# Patient Record
Sex: FEMALE | Race: WHITE | NOT HISPANIC OR LATINO | Employment: OTHER | URBAN - METROPOLITAN AREA
[De-identification: names, ages, dates, MRNs, and addresses within clinical notes are randomized per-mention and may not be internally consistent; named-entity substitution may affect disease eponyms.]

---

## 2023-09-25 ENCOUNTER — HOSPITAL ENCOUNTER (OUTPATIENT)
Dept: DATA CONVERSION | Facility: HOSPITAL | Age: 68
Discharge: HOME | End: 2023-09-25
Payer: MEDICARE

## 2023-09-25 DIAGNOSIS — Z87.891 PERSONAL HISTORY OF NICOTINE DEPENDENCE: ICD-10-CM

## 2023-09-25 DIAGNOSIS — M47.26 OTHER SPONDYLOSIS WITH RADICULOPATHY, LUMBAR REGION: ICD-10-CM

## 2023-09-25 DIAGNOSIS — M54.16 RADICULOPATHY, LUMBAR REGION: ICD-10-CM

## 2023-09-25 DIAGNOSIS — M54.51 VERTEBROGENIC LOW BACK PAIN: ICD-10-CM

## 2023-09-25 DIAGNOSIS — M48.062 SPINAL STENOSIS, LUMBAR REGION WITH NEUROGENIC CLAUDICATION: ICD-10-CM

## 2023-09-25 DIAGNOSIS — Z98.84 BARIATRIC SURGERY STATUS: ICD-10-CM

## 2023-10-02 ENCOUNTER — APPOINTMENT (OUTPATIENT)
Dept: PRIMARY CARE | Facility: CLINIC | Age: 68
End: 2023-10-02
Payer: MEDICARE

## 2023-10-02 PROBLEM — G62.9 NEUROPATHY: Status: ACTIVE | Noted: 2023-10-02

## 2023-10-02 PROBLEM — K46.0 INCARCERATED HERNIA: Status: ACTIVE | Noted: 2023-10-02

## 2023-10-02 PROBLEM — F32.1 CURRENT MODERATE EPISODE OF MAJOR DEPRESSIVE DISORDER (MULTI): Status: ACTIVE | Noted: 2023-10-02

## 2023-10-02 PROBLEM — F41.9 ANXIETY: Status: ACTIVE | Noted: 2023-10-02

## 2023-10-02 PROBLEM — M54.12 CERVICAL RADICULOPATHY: Status: ACTIVE | Noted: 2023-10-02

## 2023-10-02 PROBLEM — K43.0 INCISIONAL HERNIA OF ANTERIOR ABDOMINAL WALL WITH OBSTRUCTION: Status: ACTIVE | Noted: 2023-10-02

## 2023-10-02 PROBLEM — E66.01 SEVERE OBESITY (BMI >= 40) (MULTI): Status: ACTIVE | Noted: 2023-10-02

## 2023-10-02 PROBLEM — E11.9 TYPE 2 DIABETES MELLITUS WITHOUT COMPLICATION, WITHOUT LONG-TERM CURRENT USE OF INSULIN (MULTI): Status: ACTIVE | Noted: 2023-10-02

## 2023-10-02 PROBLEM — M15.9 PRIMARY OSTEOARTHRITIS INVOLVING MULTIPLE JOINTS: Status: ACTIVE | Noted: 2023-10-02

## 2023-10-02 PROBLEM — M15.0 PRIMARY OSTEOARTHRITIS INVOLVING MULTIPLE JOINTS: Status: ACTIVE | Noted: 2023-10-02

## 2023-10-02 PROBLEM — G47.33 OBSTRUCTIVE SLEEP APNEA SYNDROME: Status: ACTIVE | Noted: 2020-09-14

## 2023-10-02 PROBLEM — M47.26 OSTEOARTHRITIS OF SPINE WITH RADICULOPATHY, LUMBAR REGION: Status: ACTIVE | Noted: 2023-10-02

## 2023-10-02 PROBLEM — M48.061 DEGENERATIVE LUMBAR SPINAL STENOSIS: Status: ACTIVE | Noted: 2019-06-29

## 2023-10-02 PROBLEM — I10 HTN (HYPERTENSION), BENIGN: Status: ACTIVE | Noted: 2022-03-17

## 2023-10-02 PROBLEM — G47.00 INSOMNIA: Status: ACTIVE | Noted: 2023-10-02

## 2023-10-02 PROBLEM — K43.9 HERNIA OF ABDOMINAL WALL: Status: ACTIVE | Noted: 2023-10-02

## 2023-10-02 PROBLEM — E55.9 VITAMIN D DEFICIENCY: Status: ACTIVE | Noted: 2023-10-02

## 2023-10-02 PROBLEM — M47.812 ARTHROPATHY OF CERVICAL FACET JOINT: Status: ACTIVE | Noted: 2023-10-02

## 2023-10-02 RX ORDER — PREGABALIN 100 MG/1
100 CAPSULE ORAL 2 TIMES DAILY
COMMUNITY
Start: 2023-09-06 | End: 2023-10-31

## 2023-10-02 RX ORDER — TRAZODONE HYDROCHLORIDE 100 MG/1
100 TABLET ORAL NIGHTLY
COMMUNITY
Start: 2022-04-27 | End: 2023-12-21

## 2023-10-02 RX ORDER — MELOXICAM 15 MG/1
15 TABLET ORAL DAILY
COMMUNITY
Start: 2015-02-04 | End: 2023-12-21

## 2023-10-02 RX ORDER — DULOXETIN HYDROCHLORIDE 60 MG/1
1 CAPSULE, DELAYED RELEASE ORAL DAILY
COMMUNITY
End: 2023-12-21

## 2023-10-02 RX ORDER — SERTRALINE HYDROCHLORIDE 100 MG/1
100 TABLET, FILM COATED ORAL EVERY 24 HOURS
COMMUNITY
Start: 2020-01-17 | End: 2023-12-21

## 2023-10-02 RX ORDER — CHOLECALCIFEROL (VITAMIN D3) 25 MCG
25 TABLET ORAL EVERY 24 HOURS
COMMUNITY
End: 2023-10-04 | Stop reason: ALTCHOICE

## 2023-10-02 RX ORDER — AMLODIPINE BESYLATE 10 MG/1
10 TABLET ORAL EVERY 24 HOURS
COMMUNITY
Start: 2021-03-04 | End: 2024-03-18

## 2023-10-02 RX ORDER — FLUTICASONE PROPIONATE 50 MCG
1 SPRAY, SUSPENSION (ML) NASAL EVERY 24 HOURS
COMMUNITY
Start: 2023-01-27 | End: 2023-12-21

## 2023-10-02 RX ORDER — LORATADINE 10 MG/1
10 TABLET ORAL DAILY
COMMUNITY
Start: 2019-02-21 | End: 2023-12-21

## 2023-10-02 RX ORDER — DOCUSATE SODIUM 100 MG/1
1 CAPSULE, LIQUID FILLED ORAL EVERY 24 HOURS
COMMUNITY
End: 2023-12-21

## 2023-10-02 RX ORDER — LISINOPRIL 10 MG/1
10 TABLET ORAL EVERY 24 HOURS
COMMUNITY
Start: 2019-06-06 | End: 2023-10-05

## 2023-10-03 PROBLEM — G47.36 COMORBID SLEEP-RELATED HYPOVENTILATION: Status: ACTIVE | Noted: 2023-10-03

## 2023-10-03 PROBLEM — E66.9 NOCTURNAL HYPOXEMIA DUE TO OBESITY: Status: ACTIVE | Noted: 2023-10-03

## 2023-10-03 PROBLEM — M54.17 LUMBOSACRAL RADICULITIS: Status: ACTIVE | Noted: 2023-10-03

## 2023-10-03 PROBLEM — E11.9 DIABETES MELLITUS (MULTI): Status: ACTIVE | Noted: 2022-03-17

## 2023-10-03 PROBLEM — R60.0 PERIPHERAL EDEMA: Status: ACTIVE | Noted: 2023-10-03

## 2023-10-03 PROBLEM — I82.409 DEEP VEIN THROMBOSIS (MULTI): Status: ACTIVE | Noted: 2019-06-29

## 2023-10-03 PROBLEM — R60.9 PERIPHERAL EDEMA: Status: ACTIVE | Noted: 2023-10-03

## 2023-10-03 PROBLEM — G47.36 NOCTURNAL HYPOXEMIA DUE TO OBESITY: Status: ACTIVE | Noted: 2023-10-03

## 2023-10-04 ENCOUNTER — OFFICE VISIT (OUTPATIENT)
Dept: PRIMARY CARE | Facility: CLINIC | Age: 68
End: 2023-10-04
Payer: MEDICARE

## 2023-10-04 ENCOUNTER — APPOINTMENT (OUTPATIENT)
Dept: PRIMARY CARE | Facility: CLINIC | Age: 68
End: 2023-10-04
Payer: MEDICARE

## 2023-10-04 VITALS
OXYGEN SATURATION: 97 % | RESPIRATION RATE: 16 BRPM | BODY MASS INDEX: 45.96 KG/M2 | WEIGHT: 286 LBS | HEART RATE: 68 BPM | DIASTOLIC BLOOD PRESSURE: 68 MMHG | SYSTOLIC BLOOD PRESSURE: 137 MMHG | TEMPERATURE: 97 F | HEIGHT: 66 IN

## 2023-10-04 DIAGNOSIS — F41.9 ANXIETY: ICD-10-CM

## 2023-10-04 DIAGNOSIS — I10 HTN (HYPERTENSION), BENIGN: ICD-10-CM

## 2023-10-04 DIAGNOSIS — F33.1 MODERATE EPISODE OF RECURRENT MAJOR DEPRESSIVE DISORDER (MULTI): ICD-10-CM

## 2023-10-04 DIAGNOSIS — M47.26 OSTEOARTHRITIS OF SPINE WITH RADICULOPATHY, LUMBAR REGION: ICD-10-CM

## 2023-10-04 DIAGNOSIS — E66.01 CLASS 3 SEVERE OBESITY WITH SERIOUS COMORBIDITY AND BODY MASS INDEX (BMI) OF 45.0 TO 49.9 IN ADULT, UNSPECIFIED OBESITY TYPE (MULTI): Primary | ICD-10-CM

## 2023-10-04 DIAGNOSIS — I10 HTN (HYPERTENSION), BENIGN: Primary | ICD-10-CM

## 2023-10-04 DIAGNOSIS — M15.9 PRIMARY OSTEOARTHRITIS INVOLVING MULTIPLE JOINTS: ICD-10-CM

## 2023-10-04 DIAGNOSIS — E11.59 TYPE 2 DIABETES MELLITUS WITH OTHER CIRCULATORY COMPLICATION, WITHOUT LONG-TERM CURRENT USE OF INSULIN (MULTI): ICD-10-CM

## 2023-10-04 DIAGNOSIS — G47.01 INSOMNIA DUE TO MEDICAL CONDITION: ICD-10-CM

## 2023-10-04 PROBLEM — E66.813 CLASS 3 SEVERE OBESITY WITH SERIOUS COMORBIDITY AND BODY MASS INDEX (BMI) OF 45.0 TO 49.9 IN ADULT: Status: ACTIVE | Noted: 2023-10-02

## 2023-10-04 PROCEDURE — 3075F SYST BP GE 130 - 139MM HG: CPT | Performed by: NURSE PRACTITIONER

## 2023-10-04 PROCEDURE — 1160F RVW MEDS BY RX/DR IN RCRD: CPT | Performed by: NURSE PRACTITIONER

## 2023-10-04 PROCEDURE — 4010F ACE/ARB THERAPY RXD/TAKEN: CPT | Performed by: NURSE PRACTITIONER

## 2023-10-04 PROCEDURE — 1125F AMNT PAIN NOTED PAIN PRSNT: CPT | Performed by: NURSE PRACTITIONER

## 2023-10-04 PROCEDURE — 1159F MED LIST DOCD IN RCRD: CPT | Performed by: NURSE PRACTITIONER

## 2023-10-04 PROCEDURE — 3078F DIAST BP <80 MM HG: CPT | Performed by: NURSE PRACTITIONER

## 2023-10-04 PROCEDURE — 99350 HOME/RES VST EST HIGH MDM 60: CPT | Performed by: NURSE PRACTITIONER

## 2023-10-04 ASSESSMENT — ENCOUNTER SYMPTOMS
DIAPHORESIS: 0
TROUBLE SWALLOWING: 0
DIFFICULTY URINATING: 0
ACTIVITY CHANGE: 0
CHILLS: 0
FREQUENCY: 0
ABDOMINAL DISTENTION: 0
BLOOD IN STOOL: 0
CONSTIPATION: 0
DIARRHEA: 0
RHINORRHEA: 0
FLANK PAIN: 0
SINUS PRESSURE: 0
UNEXPECTED WEIGHT CHANGE: 0
LIGHT-HEADEDNESS: 0
APPETITE CHANGE: 0
DIZZINESS: 0

## 2023-10-04 ASSESSMENT — PAIN SCALES - GENERAL: PAINLEVEL: 5

## 2023-10-04 NOTE — PROGRESS NOTES
Subjective   Patient ID: Aubrie Valdivia is a 68 y.o. female who presents for Follow-up (Generalized d/t difficulty getting to traditional appointments.)    This is a 67 year old female seen today in her private residence. She is awake and alert with appropriate awareness and is ambulatory with her walker. PMH: Depression, anxiety, DMII, BALTAZAR, Severe osteoarthritis, morbid obesity, lumbosacral radiculitis, neuropathy, Cervical radiculopathy.     Active with Dr. Stanley for pain management.  Currently working with injections and medial abrasion which has been effective.  Ecent CT scan shows spinal stenosis of seval levels, spinal cord pinch 2-3 level, pinched nerves, arthritis in joints and sciatica both sides. .Today she reports that her biggest concerns are weight has been steady at 286 lbs and can't loose weight.  Reports that she watches what she eats constantly watches her caloric intake.  Has an poly on her phone which she records all caloric intake.  Reports that weight has been a chronic problem.  Since her gastric bypass surgery she states that her weight remains unchanged.  Reports sleeping well at night, and a monitor and reports her REM sleep.  Independent with all ADL's.  Does report a fall appx 2 weeks ago in the bathroom, hitting her head and did have two black eyes, now healing.  There is no fever, or chills. No nausea, vomiting, diarrhea, headaches, or vision changes or recent falls. There is no hematuria, dysuria, flank pain, or increased urgency. Reports no bright red blood per rectum, melena, or hematochezia. No hematemesis or hemoptysis.         Review of Systems   Constitutional:  Negative for activity change, appetite change, chills, diaphoresis and unexpected weight change.   HENT:  Negative for congestion, rhinorrhea, sinus pressure and trouble swallowing.    Gastrointestinal:  Negative for abdominal distention, blood in stool, constipation and diarrhea.   Genitourinary:  Negative for  "difficulty urinating, flank pain and frequency.   Neurological:  Negative for dizziness and light-headedness.       Objective   /68   Pulse 68   Temp 36.1 °C (97 °F)   Resp 16   Ht 1.676 m (5' 6\")   Wt 130 kg (286 lb)   SpO2 97%   BMI 46.16 kg/m²     Physical Exam  Constitutional:       Appearance: Normal appearance. She is obese.   HENT:      Head: Normocephalic.      Right Ear: Tympanic membrane normal.      Left Ear: Tympanic membrane normal.      Mouth/Throat:      Mouth: Mucous membranes are moist.   Eyes:      Pupils: Pupils are equal, round, and reactive to light.   Cardiovascular:      Rate and Rhythm: Normal rate and regular rhythm.   Pulmonary:      Breath sounds: Normal breath sounds.   Abdominal:      General: Bowel sounds are normal.      Palpations: Abdomen is soft.   Musculoskeletal:      Cervical back: Neck supple. No tenderness.      Comments: Limited ROM all extremeties   Skin:     General: Skin is warm and dry.   Neurological:      Mental Status: She is oriented to person, place, and time.         Assessment/Plan   Problem List Items Addressed This Visit             ICD-10-CM    Class 3 severe obesity with serious comorbidity and body mass index (BMI) of 45.0 to 49.9 in adult (CMS/[facility]) - Primary E66.01, Z68.42    Anxiety F41.9    Primary osteoarthritis involving multiple joints M15.9    Osteoarthritis of spine with radiculopathy, lumbar region M47.26    Insomnia G47.00    HTN (hypertension), benign I10    Current moderate episode of major depressive disorder (CMS/[facility]) F32.1    Diabetes mellitus (CMS/[facility]) E11.9          "

## 2023-10-05 RX ORDER — LISINOPRIL 10 MG/1
10 TABLET ORAL DAILY
Qty: 28 TABLET | Refills: 10 | Status: SHIPPED | OUTPATIENT
Start: 2023-10-05

## 2023-10-18 ENCOUNTER — TELEPHONE (OUTPATIENT)
Dept: PRIMARY CARE | Facility: CLINIC | Age: 68
End: 2023-10-18
Payer: MEDICARE

## 2023-10-18 NOTE — TELEPHONE ENCOUNTER
Patient calls to report she is out of ozempic.  Patient states she didn't know she was to take the cap off the injector when she was done and dosage leaked.  This nurse inquires what dosage patient gave herself last week.  Patient states the box says either 0.25 or 0.50 so I gave myself the 0.50 dose last week and this week and now I'm out.  This nurse able to check the order sent to pharmacy, stating 0.25ml once weekly.  Please Advise.

## 2023-10-28 DIAGNOSIS — M54.17 LUMBOSACRAL RADICULITIS: Primary | ICD-10-CM

## 2023-10-30 ENCOUNTER — HOSPITAL ENCOUNTER (OUTPATIENT)
Dept: GASTROENTEROLOGY | Facility: HOSPITAL | Age: 68
Discharge: HOME | End: 2023-10-30
Payer: MEDICARE

## 2023-10-30 ENCOUNTER — HOSPITAL ENCOUNTER (OUTPATIENT)
Dept: RADIOLOGY | Facility: HOSPITAL | Age: 68
Discharge: HOME | End: 2023-10-30
Payer: MEDICARE

## 2023-10-30 VITALS
HEART RATE: 82 BPM | RESPIRATION RATE: 20 BRPM | HEIGHT: 66 IN | OXYGEN SATURATION: 97 % | DIASTOLIC BLOOD PRESSURE: 56 MMHG | BODY MASS INDEX: 45 KG/M2 | WEIGHT: 280 LBS | SYSTOLIC BLOOD PRESSURE: 152 MMHG | TEMPERATURE: 97.3 F

## 2023-10-30 PROCEDURE — 94760 N-INVAS EAR/PLS OXIMETRY 1: CPT

## 2023-10-30 PROCEDURE — 64494 INJ PARAVERT F JNT L/S 2 LEV: CPT | Performed by: STUDENT IN AN ORGANIZED HEALTH CARE EDUCATION/TRAINING PROGRAM

## 2023-10-30 PROCEDURE — 64494 INJ PARAVERT F JNT L/S 2 LEV: CPT | Mod: 50 | Performed by: STUDENT IN AN ORGANIZED HEALTH CARE EDUCATION/TRAINING PROGRAM

## 2023-10-30 PROCEDURE — 64493 INJ PARAVERT F JNT L/S 1 LEV: CPT | Mod: 50 | Performed by: STUDENT IN AN ORGANIZED HEALTH CARE EDUCATION/TRAINING PROGRAM

## 2023-10-30 PROCEDURE — 64636 DESTROY L/S FACET JNT ADDL: CPT | Mod: 50

## 2023-10-30 PROCEDURE — 64493 INJ PARAVERT F JNT L/S 1 LEV: CPT | Performed by: STUDENT IN AN ORGANIZED HEALTH CARE EDUCATION/TRAINING PROGRAM

## 2023-10-30 PROCEDURE — 64635 DESTROY LUMB/SAC FACET JNT: CPT | Mod: 50

## 2023-10-30 PROCEDURE — 76000 FLUOROSCOPY <1 HR PHYS/QHP: CPT

## 2023-10-30 ASSESSMENT — ENCOUNTER SYMPTOMS
SORE THROAT: 0
UNEXPECTED WEIGHT CHANGE: 0
LIGHT-HEADEDNESS: 0
HEADACHES: 0
WEAKNESS: 0
CHILLS: 0
COUGH: 0
DIZZINESS: 0
RHINORRHEA: 0
FEVER: 0
NERVOUS/ANXIOUS: 0
SHORTNESS OF BREATH: 0
DYSPHORIC MOOD: 0
COLOR CHANGE: 0
BRUISES/BLEEDS EASILY: 0
EYE DISCHARGE: 0
PALPITATIONS: 0
NUMBNESS: 0
MYALGIAS: 0

## 2023-10-30 ASSESSMENT — PAIN SCALES - GENERAL
PAINLEVEL_OUTOF10: 10 - WORST POSSIBLE PAIN
PAINLEVEL_OUTOF10: 5 - MODERATE PAIN

## 2023-10-30 ASSESSMENT — PAIN - FUNCTIONAL ASSESSMENT
PAIN_FUNCTIONAL_ASSESSMENT: 0-10
PAIN_FUNCTIONAL_ASSESSMENT: 0-10

## 2023-10-30 ASSESSMENT — PAIN DESCRIPTION - DESCRIPTORS: DESCRIPTORS: SHARP;JABBING

## 2023-10-30 ASSESSMENT — COLUMBIA-SUICIDE SEVERITY RATING SCALE - C-SSRS
1. IN THE PAST MONTH, HAVE YOU WISHED YOU WERE DEAD OR WISHED YOU COULD GO TO SLEEP AND NOT WAKE UP?: NO
2. HAVE YOU ACTUALLY HAD ANY THOUGHTS OF KILLING YOURSELF?: NO
6. HAVE YOU EVER DONE ANYTHING, STARTED TO DO ANYTHING, OR PREPARED TO DO ANYTHING TO END YOUR LIFE?: NO

## 2023-10-30 NOTE — OP NOTE
"Procedure Note: 10/30/2023    PROCEDURE NAME: Bilateral diagnostic lumbar medial branch nerve blocks, L4/5, L5/S1    Patient Information: Aubrie Valdivia, MRN: [id number], : 1955  Chief Complaint: Low back pain    Pain Diagnosis: The diagnosis of lumbar facet arthropathy has been made given the patient's clinical evaluation at prior evaluation.    Vitals:BP (!) 172/92   Pulse 86   Temp 36.5 °C (97.7 °F) (Temporal)   Resp 18   Ht 1.676 m (5' 6\")   Wt 127 kg (280 lb)   SpO2 95%   BMI 45.19 kg/m²     DESCRIPTION OF PROCEDURE:    Aubrie Valdivia was brought to the procedure room. The assistant obtained vital signs, which were found to be stable. She was then informed of the procedure, its benefits, and its risks, and her questions were answered regarding the procedure. Written informed consent was obtained from the patient. Immediately prior to starting the procedure, a time-out safety check was conducted and the patient's identification, procedure name, and procedure site were confirmed with the patient.    Bilateral Diagnostic Lumbar Medial Branch Blocks of L3, L4 and L5DR  After informed written consent was obtained, the patient was placed in prone position. Monitoring of pulse oximetry, heart rate, and blood pressure was done pre-procedure, and post-procedure. During the procedure the patient was monitored with continuous pulse-ox. A time out was performed before the beginning of the procedure. The correct site and laterality were marked. The skin was prepped with chlorhexidine, allowed to dry for 3 minutes, and draped in a sterile fashion.     Using an AP and oblique fluoroscopic view, the right transverse process of L4, L5, and the sacral ala were identified. The entry sites were marked and infiltrated with 1 mL of lidocaine 1% using a 25 g 1.5 inch needle. A 22 g 7 in. spinal needle was advanced along the superior margin of the L4 transverse process and lateral to the L4 superior articulating " process. It was directed inferiorly and medially so that the tip struck the junction of the base of the transverse process and advanced 2 mm along the course of the L3 medial branch nerve. A similar procedure was performed at L4 medial branch nerve at the junction of the L5 transverse process and superior articulating process. A similar procedure was performed at the L5 dorsal ramus at the junction of the S1 superior articular process and right sacral ala. This was then repeated at the same levels on the left in a similar fashion. A lateral fluoroscopic image was taken to confirm appropriate needle placement. At each location after negative aspiration, 0.5 ml of 2% lidocaine was injected. All needles were removed      Anesthesia: local anesthesia   Complications: none      Bipin Dougherty MD

## 2023-10-30 NOTE — DISCHARGE INSTRUCTIONS
DISCHARGEINSTRUCTIONS FOR INJECTIONS     You underwent bilateral diagnostic lumbar medial branch nerve blocks today    Aftermost injections, it is recommended that you relax and limit your activity for the remainder of the day unless you have been told otherwise by your pain physician.  You should not drive a car, operate machinery, or make important legal decisions unless otherwise directed by your pain physician.  You may resume your normal activity, including exercise, tomorrow.      Keep a written pain diary of how much pain relief you experienced following the injection procedure and the length of time of pain relief you experienced pain relief. Following diagnostic injections like medial branch nerve blocks, sacroiliac joint blocks, stellate ganglion injections and other blocks, it is very important you record the specific amount of pain relief you experienced immediately after the injectionand how long it lasted. Your doctor will ask you for this information at your follow up visit.     For all injections, please keep the injection site dry and inspect the site for a couple of days. You may remove the Band-Aid the day of the injection at any time.     Some discomfort, bruising or slight swelling may occur at the injection site. This is not abnormal if it occurs.  If needed you may:    -Take over the counter medication such as Tylenol or Motrin.   -Apply an ice pack for 30 minutes, 2 to 3 times a day for the first 24 hours.     You may shower today; no soaking baths, hot tubs, whirlpools or swimming pools for two days.      If you are given steroids in your injection, it may take 3-5 days for the steroid medication to take effect. You may notice a worsening of your symptoms for 1-2 days after the injection. This is not abnormal.  You may use acetaminophen, ibuprofen, or prescription medication that your doctor may have prescribed for you if you need to do so.     A few common side effects of steroids include  facial flushing, sweating, restlessness, irritability,difficulty sleeping, increase in blood sugar, and increased blood pressure. If you have diabetes, please monitor your blood sugar at least once a day for at least 5 days. If you have poorly controlled high blood pressure, monitoryour blood pressure for at least 2 days and contact your primary care physician if these numbers are unusually high for you.      If you take aspirin or non-steroidal anti-inflammatory drugs (examples are Motrin, Advil, ibuprofen, Naprosyn, Voltaren, Relafen, etc.) you may restart these this evening, but stop taking it 3 days before your next appointment, unless instructed otherwiseby your physician.      You do not need to discontinue non-aspirin-containing pain medications prior to an injection (examples: Celebrex, tramadol, hydrocodone and acetaminophen).      If you take a blood thinning medication (Coumadin, Lovenox, Fragmin,Ticlid, Plavix, Pradaxa, etc.), please discuss this with your primary care physician/cardiologist and your pain physician. These medications MUST be discontinued before you can have an injection safely, without the risk of uncontrolled bleeding. If these medications are not discontinued for an appropriate period of time, you will not be able to receivean injection.      If you are taking Coumadin, please have your INR checked the morning of your procedure and bringthe result to your appointment unless otherwise instructed. If your INR is over 1.2, your injection may need to be rescheduled to avoid uncontrolled bleeding from the needle placement.     Call [facility] at [phone] between 8am-4pm Monday - Friday if you are experiencing the following:    If you received an epidural or spinal injection:    -Headache that doesnot go away with medicine, is worse when sitting or standing up, and is greatly relieved upon lying down.   -Severe pain worse than or different than your baseline pain.    -Chills or fever (101º F or greater).   -Drainage or signs of infection at the injection site     Go directly to the Emergency Department if you are experiencing the following and received an epidural or spinal injection:   -Abrupt weakness or progressive weakness in your legs that starts after you leave the clinic.   -Abrupt severe or worsening numbness in your legs.   -Inability to urinate after the injection or loss of bowel or bladder control without the urge to defecate or urinate.     If you have a clinical question that cannot wait until your next appointment, please call [phone] between 8am-4pm Monday - Friday or send a [employer] message. We do our best to return all non-emergency messages within 24 hours, Monday - Friday. A nurse or physician will return your message.      If you need to cancel an appointment, please call the scheduling staff at [phone] during normal business hours or leave a message at least 24 hours in advance.     If you are going to be sedated for your next procedure, you MUST have responsible adult who can legally drive accompany you home. You cannot eat or drink for eight hours prior to the planned procedure if you are going to receive sedation. You may take your non-blood thinning medications with a small sip of water.

## 2023-10-30 NOTE — H&P
History Of Present Illness  Aubrie Valdivia is a 68 y.o. female presenting with low back pain from lumbar facet arthropathy. 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 + BSO, Omentectomy, Debulking        Social History  She reports that she quit smoking about 28 years ago. Her smoking use included cigarettes. She smoked an average of .5 packs per day. She has never used smokeless tobacco. She reports current alcohol use. She reports that she does not use drugs.    Family History  Family History   Problem Relation Name Age of Onset    Alzheimer's disease Mother      Heart disease Father      Heart disease Sister      No Known Problems Daughter          Allergies  Patient has no known allergies.    Review of Systems   Constitutional:  Negative for chills, fever and unexpected weight change.   HENT:  Negative for congestion, rhinorrhea, sneezing and sore throat.    Eyes:  Negative for discharge.   Respiratory:  Negative for cough and shortness of breath.    Cardiovascular:  Negative for chest pain, palpitations and leg swelling.   Musculoskeletal:  Negative for gait problem and myalgias.   Skin:  Negative for color change and rash.   Neurological:  Negative for dizziness, weakness, light-headedness, numbness and headaches.   Hematological:  Does not bruise/bleed easily.   Psychiatric/Behavioral:  Negative for dysphoric mood. The patient is not nervous/anxious.         Physical Exam  Vitals and nursing note reviewed.   Constitutional:       General: She is not in acute distress.      "Appearance: Normal appearance. She is not ill-appearing.   HENT:      Head: Normocephalic and atraumatic.   Eyes:      Conjunctiva/sclera: Conjunctivae normal.   Pulmonary:      Effort: Pulmonary effort is normal. No respiratory distress.   Musculoskeletal:         General: No swelling or deformity.      Cervical back: Neck supple. No rigidity or tenderness.      Right lower leg: No edema.      Left lower leg: No edema.   Skin:     General: Skin is warm and dry.      Findings: No bruising, erythema, lesion or rash.   Neurological:      General: No focal deficit present.      Mental Status: She is alert and oriented to person, place, and time.   Psychiatric:         Mood and Affect: Mood normal.          Last Recorded Vitals  Blood pressure (!) 167/92, pulse 77, temperature 36.5 °C (97.7 °F), temperature source Temporal, resp. rate 16, height 1.676 m (5' 6\"), weight 127 kg (280 lb), SpO2 95 %.    Relevant Results         Assessment/Plan   Active Problems:  Lumbar facet arthropathy    She continues to have axial low back pain, unchanged from before. Proceed with bilateral L4/5, L5/S1 medial branch nerve blocks as planned         I spent 10 minutes in the professional and overall care of this patient.      Bipin Dougherty MD    "

## 2023-10-30 NOTE — POST-PROCEDURE NOTE
Returns from procedure awake and conversing. Bed low & locked. Bandaids x 3 d/I. Friend @ bedside.    1236-Ambulates with walker to wheelchair as preop. Tolerating PO water.    1237-Discharged in stable condition via wheelchair with friend Johanne.

## 2023-10-31 RX ORDER — PREGABALIN 100 MG/1
CAPSULE ORAL
Qty: 56 CAPSULE | Refills: 4 | Status: SHIPPED | OUTPATIENT
Start: 2023-10-31 | End: 2024-04-15

## 2023-10-31 NOTE — H&P (VIEW-ONLY)
[facility]  Follow Up Office Visit Note 2023    Patient Information: Aubrie Valdivia, MRN: [id number], : 1955   Primary Care/Referring Physician: Chacha Bradley MD, [address] / [place] [address]     Chief Complaint: Low back pain  Interval History: She returns for follow up after her 2nd bilateral L4/5, L5/S1 mbb's    Today she reports again 90% pain relief from this procedure. She had less pain and wasn't having to use her walker as much. She is overall very happy with the results and would like to proceed with RF ablation    At baseline she describes primarily axial pain, with radiation to her buttocks, right moreso than left, that worsens significantly when standing and trying to walk    Brief History of Pain: Ms. Aubrie Valdivia is a 68 y.o. female with a PMHx of HTN, T2DM, BALTAZAR, depression, anxiety who presents for evaluation of low back and neck pain.    For reference, she reports pain going from her low back down the back of both legs to the feet. The pain is described as dull constantly, with certain movements causing severe sharp pain. This severe pain happens with twisting, flexion and extension but appears worse with extension. She reports constant numbness in her entire left hand, with her right hand being intermittently numb. Her feet are also constantly numb, which improves when she elevates her feet. She denies any significant burning pain. Of note, she has a history of gastric bypass 2021 with significant weight loss (initially 397 lb, lost about 120 lbs but now weight loss has plateaued).    Current Pain Medications: Duloxetine 60 mg daily, Meloxicam 15 mg daily, Pregabalin 50 mg BID  Previously Tried Pain Medications: Gabapentin 800 mg TID - stopped this and didn't notice any worsening pain    Relevant Surgeries: Denies spine or major joint surgery  Injections: L4/5 GHADA - no benefit. Lumbar TFESI w/ Dr. Fajardo without benefit  Physical/Occupational Therapy:  She has done PT which she felt made her pain worse    Medications:   Current Outpatient Medications   Medication Instructions    amLODIPine (NORVASC) 10 mg, oral, Every 24 hours    docusate sodium (Colace) 100 mg capsule 1 capsule, oral, Every 24 hours    DULoxetine (Cymbalta) 60 mg DR capsule 1 capsule, oral, Daily    fluticasone (Flonase) 50 mcg/actuation nasal spray 1 spray, Each Nostril, Every 24 hours    lisinopril 10 mg, oral, Daily    loratadine (CLARITIN) 10 mg, oral, Daily    meloxicam (MOBIC) 15 mg, oral, Daily    multivit-min/iron/FA/vit K/lut (MULTIVITAMIN WOMEN 50 PLUS ORAL) Every 24 hours    pregabalin (Lyrica) 100 mg capsule 1 CAP BY MOUTH TWICE A DAY    semaglutide 0.25 mg, subcutaneous, Weekly    sertraline (ZOLOFT) 100 mg, oral, Every 24 hours    traZODone (DESYREL) 100 mg, oral, Nightly      Allergies: No Known Allergies    Past Medical & Surgical History:  Past Medical History:   Diagnosis Date    Back pain     Hypertension     Malignant neoplasm of unspecified ovary (CMS/HCC) 2015    Malignant neoplasm of ovary      Past Surgical History:   Procedure Laterality Date    APPENDECTOMY  2015    Appendectomy    BARIATRIC SURGERY      CHOLECYSTECTOMY  2015    Cholecystectomy    HYSTERECTOMY  2015    Hysterectomy    OTHER SURGICAL HISTORY  2015    Resection Of Ovarian Malig. + BSO, Omentectomy, Debulking       Family History   Problem Relation Name Age of Onset    Alzheimer's disease Mother      Heart disease Father      Heart disease Sister      No Known Problems Daughter       Social History     Socioeconomic History    Marital status:      Spouse name: Not on file    Number of children: Not on file    Years of education: Not on file    Highest education level: Not on file   Occupational History    Not on file   Tobacco Use    Smoking status: Former     Packs/day: .5     Types: Cigarettes     Quit date:      Years since quittin.8    Smokeless tobacco:  "Never   Vaping Use    Vaping Use: Never used   Substance and Sexual Activity    Alcohol use: Yes     Comment: vodka and lime nightly    Drug use: Never    Sexual activity: Yes   Other Topics Concern    Not on file   Social History Narrative    Not on file     Social Determinants of Health     Financial Resource Strain: Not on file   Food Insecurity: Not on file   Transportation Needs: Not on file   Physical Activity: Not on file   Stress: Not on file   Social Connections: Not on file   Intimate Partner Violence: Not on file   Housing Stability: Not on file       Problems, Past medical history, past surgical history, Medications, allergies, social and family history reviewed and as per the electronic medical record from today's encounter    Review of Systems:  CONST: No fever, chills, fatigue, weight changes  EYES: No loss of vision  ENT: No hearing loss, tinnitus  CV: No chest pain, palpitations  RESP: No dyspnea, shortness of breath, cough  GI: No stool incontinence, nausea, vomiting  : No urinary incontinence  MSK: No joint swelling  SKIN: No rash, no hives  NEURO: No headache, dizziness, weakness, paresthesias  PSYCH: No anxiety, depression or suicidal ideation  HEM/LYMPH: No easy bruising or bleeding  All other systems reviewed are negative     Physical Exam:  Vitals: Ht 1.651 m (5' 5\")   Wt 127 kg (280 lb)   BMI 46.59 kg/m²   Limited by telemedicine  General: No apparent distress. Alert, appropriate, oriented x 3. Mood generally positive, affect congruent. Speaking in full sentences.   HENT: Normocephalic, atraumatic. Hearing intact.  Lungs: Grossly nonlabored breathing.    Laboratory Data:  The following laboratory data were reviewed during this visit:   Lab Results   Component Value Date    WBC 6.7 10/07/2021    RBC 5.39 (H) 10/07/2021    HGB 16.0 (H) 10/07/2021    HCT 51.4 (H) 10/07/2021     10/07/2021      Lab Results   Component Value Date    INR 1.1 06/20/2020     Lab Results   Component Value " Date    CREATININE 1.0 04/06/2022    HGBA1C 5.4 10/07/2021       Imaging:  The following imaging impressions were reviewed by me during this visit:    -4/24/23 lumbar spine MRI shows L2-3 mild-to-moderate central canal narrowing with moderate right neural foramen narrowing. L3-4 severe canal stenosis, severe right and moderate left neural foramen stenosis. L4-5 there is a large central and left paramedian disc herniation with cranial extrusion w/ resultant severe canal stenosis and severe left neural foramen stenosis. L5-S1 there is a slightly right paramedian disc herniation abutting the right S1 nerve root, bilateral foraminal stenosis.    I also personally reviewed the images from the above studies myself. These images and my interpretation of them contributed to the management and decision making of the patient's medical plan.    ASSESSMENT:  Ms. Aubrie Valdivia is a 68 y.o. female with low back pain that is consistent with:    1. Lumbar facet arthropathy    2. Degenerative lumbar spinal stenosis    3. Vertebrogenic low back pain    4. Lumbar radiculopathy        PLAN:  Radiology: -I reviewed her 4/24/23 lumbar spine MRI which shows L2-3 mild-to-moderate central canal narrowing with moderate right neural foramen narrowing. L3-4 severe canal stenosis, severe right and moderate left neural foramen stenosis. L4-5 there is a large central and left paramedian disc herniation with cranial extrusion w/ resultant severe canal stenosis and severe left neural foramen stenosis. L5-S1 there is a slightly right paramedian disc herniation abutting the right S1 nerve root, bilateral foraminal stenosis.  - Given severity of degenerative changes on cervical spine xray could consider MRI in the future    Physically: She has tried PT in the past and it made her pain significantly worse. She appears fairly sedentary and should try to maintain activity as tolerated with the use of assistive devices  - Her weight loss has plateaued  after her gastric bypass and she is interested in seeking additional guidance. I referred her to our weight loss specialist for additional evaluation and treatment    Psychologically: No needs at this time    Medication: -Continue Pregabalin 100 mg BID    Duration: Multiple years    Intervention: -I suspect her pain is multi-factorial secondary to lumbar facet arthropathy, vertebrogenic low back pain, lumbar radiculopathy, and lumbar spinal stenosis. Given significant radicular component to her pain she underwent interlaminar GHADA at L4/5 with no benefit. She then underwent diagnostic bilateral lumbar medial branch nerve blocks at L4/5, L5/S1 x2 with 90% improvement each time. She would like to proceed with RF ablation at these levels utilizing live fluoroscopy.   - If no benefit from this could consider Intracept (although has worse pain with extension than flexion). Given severity of her degenerative changes she may need to be referred for surgical evaluation        Sincerely,  Bipin Dougherty MD  [facility] - [place]

## 2023-10-31 NOTE — PROGRESS NOTES
[facility]  Follow Up Office Visit Note 2023    Patient Information: Aubrie Valdivia, MRN: [id number], : 1955   Primary Care/Referring Physician: Chacha Bradley MD, [address] / [place] [address]     Chief Complaint: Low back pain  Interval History: She returns for follow up after her 2nd bilateral L4/5, L5/S1 mbb's    Today she reports again 90% pain relief from this procedure. She had less pain and wasn't having to use her walker as much. She is overall very happy with the results and would like to proceed with RF ablation    At baseline she describes primarily axial pain, with radiation to her buttocks, right moreso than left, that worsens significantly when standing and trying to walk    Brief History of Pain: Ms. Aubrie Valdivia is a 68 y.o. female with a PMHx of HTN, T2DM, BALTAZAR, depression, anxiety who presents for evaluation of low back and neck pain.    For reference, she reports pain going from her low back down the back of both legs to the feet. The pain is described as dull constantly, with certain movements causing severe sharp pain. This severe pain happens with twisting, flexion and extension but appears worse with extension. She reports constant numbness in her entire left hand, with her right hand being intermittently numb. Her feet are also constantly numb, which improves when she elevates her feet. She denies any significant burning pain. Of note, she has a history of gastric bypass 2021 with significant weight loss (initially 397 lb, lost about 120 lbs but now weight loss has plateaued).    Current Pain Medications: Duloxetine 60 mg daily, Meloxicam 15 mg daily, Pregabalin 50 mg BID  Previously Tried Pain Medications: Gabapentin 800 mg TID - stopped this and didn't notice any worsening pain    Relevant Surgeries: Denies spine or major joint surgery  Injections: L4/5 GHADA - no benefit. Lumbar TFESI w/ Dr. Fajardo without benefit  Physical/Occupational Therapy:  She has done PT which she felt made her pain worse    Medications:   Current Outpatient Medications   Medication Instructions    amLODIPine (NORVASC) 10 mg, oral, Every 24 hours    docusate sodium (Colace) 100 mg capsule 1 capsule, oral, Every 24 hours    DULoxetine (Cymbalta) 60 mg DR capsule 1 capsule, oral, Daily    fluticasone (Flonase) 50 mcg/actuation nasal spray 1 spray, Each Nostril, Every 24 hours    lisinopril 10 mg, oral, Daily    loratadine (CLARITIN) 10 mg, oral, Daily    meloxicam (MOBIC) 15 mg, oral, Daily    multivit-min/iron/FA/vit K/lut (MULTIVITAMIN WOMEN 50 PLUS ORAL) Every 24 hours    pregabalin (Lyrica) 100 mg capsule 1 CAP BY MOUTH TWICE A DAY    semaglutide 0.25 mg, subcutaneous, Weekly    sertraline (ZOLOFT) 100 mg, oral, Every 24 hours    traZODone (DESYREL) 100 mg, oral, Nightly      Allergies: No Known Allergies    Past Medical & Surgical History:  Past Medical History:   Diagnosis Date    Back pain     Hypertension     Malignant neoplasm of unspecified ovary (CMS/HCC) 2015    Malignant neoplasm of ovary      Past Surgical History:   Procedure Laterality Date    APPENDECTOMY  2015    Appendectomy    BARIATRIC SURGERY      CHOLECYSTECTOMY  2015    Cholecystectomy    HYSTERECTOMY  2015    Hysterectomy    OTHER SURGICAL HISTORY  2015    Resection Of Ovarian Malig. + BSO, Omentectomy, Debulking       Family History   Problem Relation Name Age of Onset    Alzheimer's disease Mother      Heart disease Father      Heart disease Sister      No Known Problems Daughter       Social History     Socioeconomic History    Marital status:      Spouse name: Not on file    Number of children: Not on file    Years of education: Not on file    Highest education level: Not on file   Occupational History    Not on file   Tobacco Use    Smoking status: Former     Packs/day: .5     Types: Cigarettes     Quit date:      Years since quittin.8    Smokeless tobacco:  "Never   Vaping Use    Vaping Use: Never used   Substance and Sexual Activity    Alcohol use: Yes     Comment: vodka and lime nightly    Drug use: Never    Sexual activity: Yes   Other Topics Concern    Not on file   Social History Narrative    Not on file     Social Determinants of Health     Financial Resource Strain: Not on file   Food Insecurity: Not on file   Transportation Needs: Not on file   Physical Activity: Not on file   Stress: Not on file   Social Connections: Not on file   Intimate Partner Violence: Not on file   Housing Stability: Not on file       Problems, Past medical history, past surgical history, Medications, allergies, social and family history reviewed and as per the electronic medical record from today's encounter    Review of Systems:  CONST: No fever, chills, fatigue, weight changes  EYES: No loss of vision  ENT: No hearing loss, tinnitus  CV: No chest pain, palpitations  RESP: No dyspnea, shortness of breath, cough  GI: No stool incontinence, nausea, vomiting  : No urinary incontinence  MSK: No joint swelling  SKIN: No rash, no hives  NEURO: No headache, dizziness, weakness, paresthesias  PSYCH: No anxiety, depression or suicidal ideation  HEM/LYMPH: No easy bruising or bleeding  All other systems reviewed are negative     Physical Exam:  Vitals: Ht 1.651 m (5' 5\")   Wt 127 kg (280 lb)   BMI 46.59 kg/m²   Limited by telemedicine  General: No apparent distress. Alert, appropriate, oriented x 3. Mood generally positive, affect congruent. Speaking in full sentences.   HENT: Normocephalic, atraumatic. Hearing intact.  Lungs: Grossly nonlabored breathing.    Laboratory Data:  The following laboratory data were reviewed during this visit:   Lab Results   Component Value Date    WBC 6.7 10/07/2021    RBC 5.39 (H) 10/07/2021    HGB 16.0 (H) 10/07/2021    HCT 51.4 (H) 10/07/2021     10/07/2021      Lab Results   Component Value Date    INR 1.1 06/20/2020     Lab Results   Component Value " Date    CREATININE 1.0 04/06/2022    HGBA1C 5.4 10/07/2021       Imaging:  The following imaging impressions were reviewed by me during this visit:    -4/24/23 lumbar spine MRI shows L2-3 mild-to-moderate central canal narrowing with moderate right neural foramen narrowing. L3-4 severe canal stenosis, severe right and moderate left neural foramen stenosis. L4-5 there is a large central and left paramedian disc herniation with cranial extrusion w/ resultant severe canal stenosis and severe left neural foramen stenosis. L5-S1 there is a slightly right paramedian disc herniation abutting the right S1 nerve root, bilateral foraminal stenosis.    I also personally reviewed the images from the above studies myself. These images and my interpretation of them contributed to the management and decision making of the patient's medical plan.    ASSESSMENT:  Ms. Aubrie Valdivia is a 68 y.o. female with low back pain that is consistent with:    1. Lumbar facet arthropathy    2. Degenerative lumbar spinal stenosis    3. Vertebrogenic low back pain    4. Lumbar radiculopathy        PLAN:  Radiology: -I reviewed her 4/24/23 lumbar spine MRI which shows L2-3 mild-to-moderate central canal narrowing with moderate right neural foramen narrowing. L3-4 severe canal stenosis, severe right and moderate left neural foramen stenosis. L4-5 there is a large central and left paramedian disc herniation with cranial extrusion w/ resultant severe canal stenosis and severe left neural foramen stenosis. L5-S1 there is a slightly right paramedian disc herniation abutting the right S1 nerve root, bilateral foraminal stenosis.  - Given severity of degenerative changes on cervical spine xray could consider MRI in the future    Physically: She has tried PT in the past and it made her pain significantly worse. She appears fairly sedentary and should try to maintain activity as tolerated with the use of assistive devices  - Her weight loss has plateaued  after her gastric bypass and she is interested in seeking additional guidance. I referred her to our weight loss specialist for additional evaluation and treatment    Psychologically: No needs at this time    Medication: -Continue Pregabalin 100 mg BID    Duration: Multiple years    Intervention: -I suspect her pain is multi-factorial secondary to lumbar facet arthropathy, vertebrogenic low back pain, lumbar radiculopathy, and lumbar spinal stenosis. Given significant radicular component to her pain she underwent interlaminar GHADA at L4/5 with no benefit. She then underwent diagnostic bilateral lumbar medial branch nerve blocks at L4/5, L5/S1 x2 with 90% improvement each time. She would like to proceed with RF ablation at these levels utilizing live fluoroscopy.   - If no benefit from this could consider Intracept (although has worse pain with extension than flexion). Given severity of her degenerative changes she may need to be referred for surgical evaluation        Sincerely,  Bipin Dougherty MD  [facility] - [place]

## 2023-11-01 ENCOUNTER — TELEMEDICINE (OUTPATIENT)
Dept: PAIN MEDICINE | Facility: CLINIC | Age: 68
End: 2023-11-01
Payer: MEDICARE

## 2023-11-01 VITALS — WEIGHT: 280 LBS | BODY MASS INDEX: 46.65 KG/M2 | HEIGHT: 65 IN

## 2023-11-01 DIAGNOSIS — M48.061 DEGENERATIVE LUMBAR SPINAL STENOSIS: ICD-10-CM

## 2023-11-01 DIAGNOSIS — M54.16 LUMBAR RADICULOPATHY: ICD-10-CM

## 2023-11-01 DIAGNOSIS — M54.51 VERTEBROGENIC LOW BACK PAIN: ICD-10-CM

## 2023-11-01 DIAGNOSIS — M47.816 LUMBAR FACET ARTHROPATHY: Primary | ICD-10-CM

## 2023-11-01 PROCEDURE — 99213 OFFICE O/P EST LOW 20 MIN: CPT | Mod: 95 | Performed by: STUDENT IN AN ORGANIZED HEALTH CARE EDUCATION/TRAINING PROGRAM

## 2023-11-01 PROCEDURE — 99213 OFFICE O/P EST LOW 20 MIN: CPT | Performed by: STUDENT IN AN ORGANIZED HEALTH CARE EDUCATION/TRAINING PROGRAM

## 2023-11-01 ASSESSMENT — PATIENT HEALTH QUESTIONNAIRE - PHQ9
1. LITTLE INTEREST OR PLEASURE IN DOING THINGS: NOT AT ALL
SUM OF ALL RESPONSES TO PHQ9 QUESTIONS 1 AND 2: 0
2. FEELING DOWN, DEPRESSED OR HOPELESS: NOT AT ALL

## 2023-11-01 ASSESSMENT — PAIN SCALES - GENERAL
PAINLEVEL: 5
PAINLEVEL_OUTOF10: 5 - MODERATE PAIN

## 2023-11-01 ASSESSMENT — PAIN - FUNCTIONAL ASSESSMENT: PAIN_FUNCTIONAL_ASSESSMENT: 0-10

## 2023-11-01 ASSESSMENT — PAIN DESCRIPTION - DESCRIPTORS: DESCRIPTORS: ACHING

## 2023-11-07 ENCOUNTER — TELEPHONE (OUTPATIENT)
Dept: PAIN MEDICINE | Facility: CLINIC | Age: 68
End: 2023-11-07
Payer: MEDICARE

## 2023-11-07 ENCOUNTER — TELEPHONE (OUTPATIENT)
Dept: PRIMARY CARE | Facility: CLINIC | Age: 68
End: 2023-11-07
Payer: MEDICARE

## 2023-11-08 ENCOUNTER — LAB (OUTPATIENT)
Dept: LAB | Facility: LAB | Age: 68
End: 2023-11-08
Payer: MEDICARE

## 2023-11-08 ENCOUNTER — OFFICE VISIT (OUTPATIENT)
Dept: PRIMARY CARE | Facility: CLINIC | Age: 68
End: 2023-11-08
Payer: MEDICARE

## 2023-11-08 VITALS
HEART RATE: 68 BPM | WEIGHT: 282 LBS | RESPIRATION RATE: 16 BRPM | HEIGHT: 65 IN | DIASTOLIC BLOOD PRESSURE: 78 MMHG | SYSTOLIC BLOOD PRESSURE: 126 MMHG | OXYGEN SATURATION: 98 % | BODY MASS INDEX: 46.98 KG/M2 | TEMPERATURE: 97.8 F

## 2023-11-08 DIAGNOSIS — I10 HTN (HYPERTENSION), BENIGN: ICD-10-CM

## 2023-11-08 DIAGNOSIS — E55.9 VITAMIN D DEFICIENCY: ICD-10-CM

## 2023-11-08 DIAGNOSIS — E66.01 MORBID OBESITY WITH BMI OF 45.0-49.9, ADULT (MULTI): ICD-10-CM

## 2023-11-08 DIAGNOSIS — M48.061 DEGENERATIVE LUMBAR SPINAL STENOSIS: ICD-10-CM

## 2023-11-08 DIAGNOSIS — E11.59 TYPE 2 DIABETES MELLITUS WITH OTHER CIRCULATORY COMPLICATION, WITHOUT LONG-TERM CURRENT USE OF INSULIN (MULTI): ICD-10-CM

## 2023-11-08 DIAGNOSIS — E03.9 ACQUIRED HYPOTHYROIDISM: ICD-10-CM

## 2023-11-08 DIAGNOSIS — F41.9 ANXIETY: ICD-10-CM

## 2023-11-08 DIAGNOSIS — I82.629 DEEP VEIN THROMBOSIS (DVT) OF OTHER VEIN OF UPPER EXTREMITY, UNSPECIFIED CHRONICITY, UNSPECIFIED LATERALITY (MULTI): ICD-10-CM

## 2023-11-08 DIAGNOSIS — I10 HTN (HYPERTENSION), BENIGN: Primary | ICD-10-CM

## 2023-11-08 LAB
25(OH)D3 SERPL-MCNC: 77 NG/ML (ref 31–100)
ALBUMIN SERPL-MCNC: 3.9 G/DL (ref 3.5–5)
ALP BLD-CCNC: 117 U/L (ref 35–125)
ALT SERPL-CCNC: 10 U/L (ref 5–40)
ANION GAP SERPL CALC-SCNC: 14 MMOL/L
AST SERPL-CCNC: 16 U/L (ref 5–40)
BASOPHILS # BLD AUTO: 0.04 X10*3/UL (ref 0–0.1)
BASOPHILS NFR BLD AUTO: 0.6 %
BILIRUB SERPL-MCNC: 0.5 MG/DL (ref 0.1–1.2)
BUN SERPL-MCNC: 18 MG/DL (ref 8–25)
CALCIUM SERPL-MCNC: 9.1 MG/DL (ref 8.5–10.4)
CHLORIDE SERPL-SCNC: 100 MMOL/L (ref 97–107)
CO2 SERPL-SCNC: 24 MMOL/L (ref 24–31)
CREAT SERPL-MCNC: 0.8 MG/DL (ref 0.4–1.6)
EOSINOPHIL # BLD AUTO: 0.12 X10*3/UL (ref 0–0.7)
EOSINOPHIL NFR BLD AUTO: 1.8 %
ERYTHROCYTE [DISTWIDTH] IN BLOOD BY AUTOMATED COUNT: 13.6 % (ref 11.5–14.5)
EST. AVERAGE GLUCOSE BLD GHB EST-MCNC: 100 MG/DL
GFR SERPL CREATININE-BSD FRML MDRD: 81 ML/MIN/1.73M*2
GLUCOSE SERPL-MCNC: 97 MG/DL (ref 65–99)
HBA1C MFR BLD: 5.1 %
HCT VFR BLD AUTO: 43.8 % (ref 36–46)
HGB BLD-MCNC: 14 G/DL (ref 12–16)
HOLD SPECIMEN: NORMAL
IMM GRANULOCYTES # BLD AUTO: 0.02 X10*3/UL (ref 0–0.7)
IMM GRANULOCYTES NFR BLD AUTO: 0.3 % (ref 0–0.9)
LYMPHOCYTES # BLD AUTO: 0.91 X10*3/UL (ref 1.2–4.8)
LYMPHOCYTES NFR BLD AUTO: 13.6 %
MCH RBC QN AUTO: 31 PG (ref 26–34)
MCHC RBC AUTO-ENTMCNC: 32 G/DL (ref 32–36)
MCV RBC AUTO: 97 FL (ref 80–100)
MONOCYTES # BLD AUTO: 0.62 X10*3/UL (ref 0.1–1)
MONOCYTES NFR BLD AUTO: 9.3 %
NEUTROPHILS # BLD AUTO: 4.99 X10*3/UL (ref 1.2–7.7)
NEUTROPHILS NFR BLD AUTO: 74.4 %
NRBC BLD-RTO: 0 /100 WBCS (ref 0–0)
PLATELET # BLD AUTO: 169 X10*3/UL (ref 150–450)
POTASSIUM SERPL-SCNC: 4.4 MMOL/L (ref 3.4–5.1)
PROT SERPL-MCNC: 7 G/DL (ref 5.9–7.9)
RBC # BLD AUTO: 4.52 X10*6/UL (ref 4–5.2)
SODIUM SERPL-SCNC: 138 MMOL/L (ref 133–145)
TSH SERPL DL<=0.05 MIU/L-ACNC: 1.66 MIU/L (ref 0.27–4.2)
WBC # BLD AUTO: 6.7 X10*3/UL (ref 4.4–11.3)

## 2023-11-08 PROCEDURE — 3044F HG A1C LEVEL LT 7.0%: CPT | Performed by: NURSE PRACTITIONER

## 2023-11-08 PROCEDURE — 36415 COLL VENOUS BLD VENIPUNCTURE: CPT

## 2023-11-08 PROCEDURE — 1125F AMNT PAIN NOTED PAIN PRSNT: CPT | Performed by: NURSE PRACTITIONER

## 2023-11-08 PROCEDURE — 84443 ASSAY THYROID STIM HORMONE: CPT

## 2023-11-08 PROCEDURE — 80053 COMPREHEN METABOLIC PANEL: CPT

## 2023-11-08 PROCEDURE — 1159F MED LIST DOCD IN RCRD: CPT | Performed by: NURSE PRACTITIONER

## 2023-11-08 PROCEDURE — 83036 HEMOGLOBIN GLYCOSYLATED A1C: CPT

## 2023-11-08 PROCEDURE — 3074F SYST BP LT 130 MM HG: CPT | Performed by: NURSE PRACTITIONER

## 2023-11-08 PROCEDURE — 1160F RVW MEDS BY RX/DR IN RCRD: CPT | Performed by: NURSE PRACTITIONER

## 2023-11-08 PROCEDURE — 3008F BODY MASS INDEX DOCD: CPT | Performed by: NURSE PRACTITIONER

## 2023-11-08 PROCEDURE — 82306 VITAMIN D 25 HYDROXY: CPT

## 2023-11-08 PROCEDURE — 85025 COMPLETE CBC W/AUTO DIFF WBC: CPT

## 2023-11-08 PROCEDURE — 4010F ACE/ARB THERAPY RXD/TAKEN: CPT | Performed by: NURSE PRACTITIONER

## 2023-11-08 PROCEDURE — 99349 HOME/RES VST EST MOD MDM 40: CPT | Performed by: NURSE PRACTITIONER

## 2023-11-08 PROCEDURE — 3078F DIAST BP <80 MM HG: CPT | Performed by: NURSE PRACTITIONER

## 2023-11-08 PROCEDURE — 1036F TOBACCO NON-USER: CPT | Performed by: NURSE PRACTITIONER

## 2023-11-08 NOTE — PROGRESS NOTES
Subjective   Patient ID: Aubrie Valdivia is a 68 y.o. female who presents for Follow-up (HTN, Labs, DMII).    This is a 68 year old female seen today in her private residence. She is awake and alert with appropriate awareness and is ambulatory with her walker. PMH: Depression, anxiety, DMII, BALTAZAR, Severe osteoarthritis, morbid obesity, lumbosacral radiculitis, neuropathy, Cervical radiculopathy. Patient seen today at home, in f/u for multiple issues.  Recently started on ozempic injection for weight loss and DMII.  Denies constipation, nausea and appears to be tolerating well.  Recent visit with pain management Dr. Guerrier with 2nd back injection which she feels has been effective thus far.  Feels that she is ambulating better and has been more active.  Appetite  is reported as good continues to monitor her caloric intake by keeping track via phone poly.  Active with psych services for anxiety/depression which is stable and managed.  HTN - managed and stable with current interventions.  Denies chest pain, palpitations, tachycardia, and shortness of breath, wheezing, no PND/orthopnea, or respiratory complaints for the patient. There is no fever, or chills. No nausea, vomiting, diarrhea, headaches, or vision changes or recent falls. There is no hematuria, dysuria, flank pain, or increased urgency.         Current Outpatient Medications:     amLODIPine (Norvasc) 10 mg tablet, Take 1 tablet (10 mg) by mouth once every 24 hours., Disp: , Rfl:     docusate sodium (Colace) 100 mg capsule, Take 1 capsule (100 mg) by mouth once every 24 hours., Disp: , Rfl:     DULoxetine (Cymbalta) 60 mg DR capsule, Take 1 capsule (60 mg) by mouth once daily., Disp: , Rfl:     fluticasone (Flonase) 50 mcg/actuation nasal spray, Administer 1 spray into each nostril once every 24 hours., Disp: , Rfl:     lisinopril 10 mg tablet, 1 TAB BY MOUTH ONCE A DAY, Disp: 28 tablet, Rfl: 10    loratadine (Claritin) 10 mg tablet, Take 1 tablet (10 mg)  "by mouth once daily., Disp: , Rfl:     meloxicam (Mobic) 15 mg tablet, Take 1 tablet (15 mg) by mouth once daily., Disp: , Rfl:     multivit-min/iron/FA/vit K/lut (MULTIVITAMIN WOMEN 50 PLUS ORAL), once every 24 hours., Disp: , Rfl:     pregabalin (Lyrica) 100 mg capsule, 1 CAP BY MOUTH TWICE A DAY, Disp: 56 capsule, Rfl: 4    semaglutide 0.25 mg or 0.5 mg (2 mg/3 mL) pen injector, Inject 0.25 mg under the skin 1 (one) time per week., Disp: 0.75 mL, Rfl: 1    sertraline (Zoloft) 100 mg tablet, Take 1 tablet (100 mg) by mouth once every 24 hours., Disp: , Rfl:     traZODone (Desyrel) 100 mg tablet, Take 1 tablet (100 mg) by mouth once daily at bedtime., Disp: , Rfl:      Review of Systems   CONSTITUTIONAL denies, fever, chills, night sweats, unexplained weight loss.  CARDIOLOGY Denies, palpitations, exertional chest pain, shortness of breath.  RESPIRATORY Negative for, persistent cough, hemoptysis, wheezing.  GI Positive for, constipation, diarrhea.  UROLOGY Denies voiding symptoms.  NEUROLOGY Positive for numbness in her hands and now L foot.  MUSCULOSKELETAL Positive for BLE weakness, and pain.        Objective   /78   Pulse 68   Temp 36.6 °C (97.8 °F) (Temporal)   Resp 16   Ht 1.651 m (5' 5\")   Wt 128 kg (282 lb)   SpO2 98%   BMI 46.93 kg/m²     Physical Exam  GENERAL APPEARANCE: alert and oriented x 3, Pleasant and cooperative, No Acute Distress.          HEART:  no murmurs regular rate and rhythm.          LUNGS: clear to auscultation bilaterally, no wheezes/rhonchi/rales.          ABDOMEN:  soft, NT/ND, BS present.          EXTREMITIES: no edema.          SKIN: no rash or skin lesions.          NEUROLOGIC EXAM: decreased sensation of the LLE, antalgic gait.          DIABETIC FOOT EXAM:  no ulcers, pedal pulses 1+ bilaterally.          MUSCULOSKELETAL: pain in neck and lower back.          PSYCH: affect normal.          FUNCTIONAL: Ambulatory with cane/walker.          REPORTS REVIEWED:  office " notes. ]    Assessment/Plan   Problem List Items Addressed This Visit             ICD-10-CM    Morbid obesity with BMI of 45.0-49.9, adult (CMS/[facility]) E66.01, Z68.42    Relevant Orders    Tsh With Reflex To Free T4 If Abnormal    Anxiety F41.9    Vitamin D deficiency E55.9    Relevant Orders    Vitamin D 25-Hydroxy,Total (for eval of Vitamin D levels)    HTN (hypertension), benign - Primary I10    Relevant Orders    CBC and Auto Differential    Comprehensive metabolic panel    Degenerative lumbar spinal stenosis M48.061    Deep vein thrombosis (CMS/[facility]) I82.409    Diabetes mellitus (CMS/[facility]) E11.9    Relevant Medications    semaglutide 0.25 mg or 0.5 mg (2 mg/3 mL) pen injector    Other Relevant Orders    Hemoglobin A1c     Other Visit Diagnoses         Codes    Acquired hypothyroidism     E03.9    Relevant Orders    Tsh With Reflex To Free T4 If Abnormal        Will continue with [facility] NP Q 3 months and PRN  Continue with ozempic 0.5mg Q weekly x 4 weeks then increase to 0.75 x 4 weeks,Will then re-evaluate effects.  Labs drawn today without complications.  CBC, CMP, TSH, Vit D, A1c         Johanne العلي, APRN-CNP

## 2023-11-27 ENCOUNTER — APPOINTMENT (OUTPATIENT)
Dept: GASTROENTEROLOGY | Facility: HOSPITAL | Age: 68
End: 2023-11-27
Payer: MEDICARE

## 2023-11-27 ENCOUNTER — HOSPITAL ENCOUNTER (OUTPATIENT)
Dept: GASTROENTEROLOGY | Facility: HOSPITAL | Age: 68
Discharge: HOME | End: 2023-11-27
Payer: MEDICARE

## 2023-11-27 ENCOUNTER — HOSPITAL ENCOUNTER (OUTPATIENT)
Dept: RADIOLOGY | Facility: HOSPITAL | Age: 68
Discharge: HOME | End: 2023-11-27
Payer: MEDICARE

## 2023-11-27 VITALS
RESPIRATION RATE: 18 BRPM | HEART RATE: 84 BPM | DIASTOLIC BLOOD PRESSURE: 64 MMHG | WEIGHT: 282 LBS | HEIGHT: 65 IN | TEMPERATURE: 97.9 F | BODY MASS INDEX: 46.98 KG/M2 | SYSTOLIC BLOOD PRESSURE: 145 MMHG | OXYGEN SATURATION: 95 %

## 2023-11-27 DIAGNOSIS — M47.816 LUMBAR FACET ARTHROPATHY: ICD-10-CM

## 2023-11-27 PROCEDURE — 64636 DESTROY L/S FACET JNT ADDL: CPT | Mod: 50 | Performed by: STUDENT IN AN ORGANIZED HEALTH CARE EDUCATION/TRAINING PROGRAM

## 2023-11-27 PROCEDURE — 64635 DESTROY LUMB/SAC FACET JNT: CPT | Mod: 50 | Performed by: STUDENT IN AN ORGANIZED HEALTH CARE EDUCATION/TRAINING PROGRAM

## 2023-11-27 PROCEDURE — 76000 FLUOROSCOPY <1 HR PHYS/QHP: CPT

## 2023-11-27 PROCEDURE — 64636 DESTROY L/S FACET JNT ADDL: CPT | Performed by: STUDENT IN AN ORGANIZED HEALTH CARE EDUCATION/TRAINING PROGRAM

## 2023-11-27 PROCEDURE — 64635 DESTROY LUMB/SAC FACET JNT: CPT | Performed by: STUDENT IN AN ORGANIZED HEALTH CARE EDUCATION/TRAINING PROGRAM

## 2023-11-27 ASSESSMENT — PAIN - FUNCTIONAL ASSESSMENT
PAIN_FUNCTIONAL_ASSESSMENT: 0-10
PAIN_FUNCTIONAL_ASSESSMENT: 0-10

## 2023-11-27 ASSESSMENT — PAIN SCALES - GENERAL
PAINLEVEL_OUTOF10: 0 - NO PAIN
PAINLEVEL_OUTOF10: 8

## 2023-11-27 ASSESSMENT — COLUMBIA-SUICIDE SEVERITY RATING SCALE - C-SSRS
6. HAVE YOU EVER DONE ANYTHING, STARTED TO DO ANYTHING, OR PREPARED TO DO ANYTHING TO END YOUR LIFE?: NO
2. HAVE YOU ACTUALLY HAD ANY THOUGHTS OF KILLING YOURSELF?: NO
2. HAVE YOU ACTUALLY HAD ANY THOUGHTS OF KILLING YOURSELF?: NO
1. IN THE PAST MONTH, HAVE YOU WISHED YOU WERE DEAD OR WISHED YOU COULD GO TO SLEEP AND NOT WAKE UP?: NO
1. IN THE PAST MONTH, HAVE YOU WISHED YOU WERE DEAD OR WISHED YOU COULD GO TO SLEEP AND NOT WAKE UP?: NO
6. HAVE YOU EVER DONE ANYTHING, STARTED TO DO ANYTHING, OR PREPARED TO DO ANYTHING TO END YOUR LIFE?: NO

## 2023-11-27 ASSESSMENT — PAIN DESCRIPTION - DESCRIPTORS: DESCRIPTORS: ACHING

## 2023-11-27 NOTE — OP NOTE
"Procedure Note: 2023    PROCEDURE NAME: Bilateral Lumbar Medial Branch Nerve Radiofrequency Ablation, L4/5, L5/S1    Patient Information: Aubrie Valdivia, MRN: [id number], : 1955  Chief Complaint: Low back pain    Pain Diagnosis: The diagnosis of Lumbar facet arthropathy has been made given the patient's clinical evaluation at prior evaluation.    Vitals:BP (!) 144/99   Pulse 90   Temp 36.5 °C (97.7 °F) (Temporal)   Resp 18   Ht 1.651 m (5' 5\")   Wt 128 kg (282 lb)   SpO2 93%   BMI 46.93 kg/m²     DESCRIPTION OF PROCEDURE:    Aubrie Valdivia was brought to the procedure room. The assistant obtained vital signs, which were found to be stable. She was then informed of the procedure, its benefits, and its risks, and her questions were answered regarding the procedure. Written informed consent was obtained from the patient. Immediately prior to starting the procedure, a time-out safety check was conducted and the patient's identification, procedure name, and procedure site were confirmed with the patient.    Bilateral L3, L4 medial branch nerve and L5 dorsal ramus radiofrequency ablation:  After informed written consent was obtained, the patient was placed in prone position with a pillow under the abdomen to reduce lumbar lordosis. Monitoring of pulse oximetry, heart rate, and blood pressure was done pre-procedure, and post-procedure. During the procedure the patient was monitored with continuous pulse-ox. A time out was performed before the beginning of the procedure. The correct site and laterality were marked. The skin was prepped with chlorhexidine, allowed to dry for a minimum of 3 minutes, and draped in a sterile fashion    Using an AP fluoroscopic view, the right transverse processes of L4, L5 vertebrae and the sacral ala were identified. The skin and subcutaneous tissue over the needle entry sites were anesthetized with infiltration of 1 mL of 1% lidocaine with a 1.5 in. 25 g needle. A " 20-gauge 15 cm radiofrequency needle with a 10 mm active tip was advanced slowly in a co-axial view along the superior margin of the L4 transverse process and lateral to the L4 superior articulating process. It was directed inferiorly and medially so that the tip struck the junction of the base of the transverse process and advanced 2 mm along the course of the L3 medial branch nerve. A similar procedure was performed at the L4 medial branch at the junction of the L5 superior articular process and transverse process. A similar procedure was performed at the L5 dorsal ramus at the junction of the S1 superior articular process and sacral ala. This was repeated on the left side. A lateral fluoroscopic image was taken to confirm appropriate needle placement and to confirm that the needle tip at each level was posterior to the neuroforamen.    Sensory testing then proceeded. At each level, a stimulation probe was placed, and the impedance was found to be between 200 and 800 Ohms and recorded. The patient was then told to report the minimal threshold for sensation, which was recorded. Motor testing was then performed. The patient reported a negative motor response in the muscles of the thigh and leg at 2 Volts. This was repeated for each level and recorded.    After the above sensory testing was complete, 1 ml of 2% lidocaine in each level was injected and allowed to take effect for 30 seconds. Lesioning was then performed at 80 degrees for 90 seconds. The patient tolerated the procedure well. They denied any lower extremity pain or paresthesia during the lesioning process. The probes were then removed and handed off the sterile field. The needles were then removed.       Anesthesia: local anesthesia   Complications: none      Bipin Dougherty MD

## 2023-11-27 NOTE — PERIOPERATIVE NURSING NOTE
Received patient from procedure room.   Patient alert and awake.   Band aid xs2 to low back dry and intact. Discharge instructions reviewed with patient.   Patient pivot transfers to wheelchair.

## 2023-11-27 NOTE — DISCHARGE INSTRUCTIONS
DISCHARGE INSTRUCTIONS FOR INJECTIONS     You underwent bilateral lumbar medial branch nerve radiofrequency ablation today    Aftermost injections, it is recommended that you relax and limit your activity for the remainder of the day unless you have been told otherwise by your pain physician.  You should not drive a car, operate machinery, or make important legal decisions unless otherwise directed by your pain physician.  You may resume your normal activity, including exercise, tomorrow.      Keep a written pain diary of how much pain relief you experienced following the injection procedure and the length of time of pain relief you experienced pain relief. Following diagnostic injections like medial branch nerve blocks, sacroiliac joint blocks, stellate ganglion injections and other blocks, it is very important you record the specific amount of pain relief you experienced immediately after the injectionand how long it lasted. Your doctor will ask you for this information at your follow up visit.     For all injections, please keep the injection site dry and inspect the site for a couple of days. You may remove the Band-Aid the day of the injection at any time.     Some discomfort, bruising or slight swelling may occur at the injection site. This is not abnormal if it occurs.  If needed you may:    -Take over the counter medication such as Tylenol or Motrin.   -Apply an ice pack for 30 minutes, 2 to 3 times a day for the first 24 hours.     You may shower today; no soaking baths, hot tubs, whirlpools or swimming pools for two days.      If you are given steroids in your injection, it may take 3-5 days for the steroid medication to take effect. You may notice a worsening of your symptoms for 1-2 days after the injection. This is not abnormal.  You may use acetaminophen, ibuprofen, or prescription medication that your doctor may have prescribed for you if you need to do so.     A few common side effects of steroids  include facial flushing, sweating, restlessness, irritability,difficulty sleeping, increase in blood sugar, and increased blood pressure. If you have diabetes, please monitor your blood sugar at least once a day for at least 5 days. If you have poorly controlled high blood pressure, monitoryour blood pressure for at least 2 days and contact your primary care physician if these numbers are unusually high for you.      If you take aspirin or non-steroidal anti-inflammatory drugs (examples are Motrin, Advil, ibuprofen, Naprosyn, Voltaren, Relafen, etc.) you may restart these this evening, but stop taking it 3 days before your next appointment, unless instructed otherwiseby your physician.      You do not need to discontinue non-aspirin-containing pain medications prior to an injection (examples: Celebrex, tramadol, hydrocodone and acetaminophen).      If you take a blood thinning medication (Coumadin, Lovenox, Fragmin,Ticlid, Plavix, Pradaxa, etc.), please discuss this with your primary care physician/cardiologist and your pain physician. These medications MUST be discontinued before you can have an injection safely, without the risk of uncontrolled bleeding. If these medications are not discontinued for an appropriate period of time, you will not be able to receivean injection.      If you are taking Coumadin, please have your INR checked the morning of your procedure and bringthe result to your appointment unless otherwise instructed. If your INR is over 1.2, your injection may need to be rescheduled to avoid uncontrolled bleeding from the needle placement.     Call [facility] at [phone] between 8am-4pm Monday - Friday if you are experiencing the following:    If you received an epidural or spinal injection:    -Headache that doesnot go away with medicine, is worse when sitting or standing up, and is greatly relieved upon lying down.   -Severe pain worse than or different than your baseline  pain.   -Chills or fever (101º F or greater).   -Drainage or signs of infection at the injection site     Go directly to the Emergency Department if you are experiencing the following and received an epidural or spinal injection:   -Abrupt weakness or progressive weakness in your legs that starts after you leave the clinic.   -Abrupt severe or worsening numbness in your legs.   -Inability to urinate after the injection or loss of bowel or bladder control without the urge to defecate or urinate.     If you have a clinical question that cannot wait until your next appointment, please call [phone] between 8am-4pm Monday - Friday or send a [employer] message. We do our best to return all non-emergency messages within 24 hours, Monday - Friday. A nurse or physician will return your message.      If you need to cancel an appointment, please call the scheduling staff at [phone] during normal business hours or leave a message at least 24 hours in advance.     If you are going to be sedated for your next procedure, you MUST have responsible adult who can legally drive accompany you home. You cannot eat or drink for eight hours prior to the planned procedure if you are going to receive sedation. You may take your non-blood thinning medications with a small sip of water.

## 2023-12-06 ENCOUNTER — TELEPHONE (OUTPATIENT)
Dept: PRIMARY CARE | Facility: CLINIC | Age: 68
End: 2023-12-06
Payer: MEDICARE

## 2023-12-06 NOTE — TELEPHONE ENCOUNTER
"Patient calls to inquire regarding Ozempic medication.  Patient states she has not lost any weight and in fact gained one pound since starting the Ozempic.  Patient states she is not sure she is giving the med correctly to her self as each time she gives herself the medication, \"there is a lot coming out\".  Patient states she sent a video of giving herself the med a friend who is a nurse and they reported that patient indeed is not giving the medication to herself properly.  Additionally, patient is requesting to know if medication dosage will be increased as previously discussed with provider KEILY العلي, PRAKASH.    "

## 2023-12-06 NOTE — TELEPHONE ENCOUNTER
Per conversation with Provider, Johanne العلي NP patient to have , Genna Singh, RN reach out for Ozempic administration instruction.  Genna made aware. Patient made aware via phone call with this nurse patient to have  instruction/teaching and receive correct dosage of medication for 30 days before change in dosing can begin.

## 2023-12-07 NOTE — TELEPHONE ENCOUNTER
Spoke with patient. Agreed to assistance with Ozempic injections. Plan is for in home visit for next injection,  Wednesday, 12/13/23.

## 2023-12-07 NOTE — TELEPHONE ENCOUNTER
Call placed to patient to offer assistance with Ozempic administration. Left voicemail message requesting return call.

## 2023-12-13 ENCOUNTER — DOCUMENTATION (OUTPATIENT)
Dept: PRIMARY CARE | Facility: CLINIC | Age: 68
End: 2023-12-13
Payer: MEDICARE

## 2023-12-13 NOTE — PROGRESS NOTES
House Calls CM in home visit to assist with Ozempic injection. Patient now aware there are 2 needle covers. Reviewed procedure for setting dose on pen and for injection. Patient administered 0.5mg dose without difficulty. Agreed to call if she has further questions.

## 2023-12-20 DIAGNOSIS — F51.02 ADJUSTMENT INSOMNIA: Primary | ICD-10-CM

## 2023-12-20 DIAGNOSIS — F32.9 REACTIVE DEPRESSION: ICD-10-CM

## 2023-12-20 DIAGNOSIS — M17.5 OTHER SECONDARY OSTEOARTHRITIS OF KNEE, UNSPECIFIED LATERALITY: ICD-10-CM

## 2023-12-20 DIAGNOSIS — K59.01 SLOW TRANSIT CONSTIPATION: ICD-10-CM

## 2023-12-20 DIAGNOSIS — T78.40XS ALLERGY, SEQUELA: ICD-10-CM

## 2023-12-20 DIAGNOSIS — E11.59 TYPE 2 DIABETES MELLITUS WITH OTHER CIRCULATORY COMPLICATION, WITHOUT LONG-TERM CURRENT USE OF INSULIN (MULTI): ICD-10-CM

## 2023-12-21 RX ORDER — SERTRALINE HYDROCHLORIDE 100 MG/1
100 TABLET, FILM COATED ORAL DAILY
Qty: 28 TABLET | Refills: 10 | Status: SHIPPED | OUTPATIENT
Start: 2023-12-21

## 2023-12-21 RX ORDER — MELOXICAM 15 MG/1
15 TABLET ORAL DAILY
Qty: 28 TABLET | Refills: 10 | Status: SHIPPED | OUTPATIENT
Start: 2023-12-21

## 2023-12-21 RX ORDER — SEMAGLUTIDE 0.68 MG/ML
INJECTION, SOLUTION SUBCUTANEOUS
Qty: 3 ML | Refills: 10 | Status: SHIPPED | OUTPATIENT
Start: 2023-12-21 | End: 2024-03-20 | Stop reason: ALTCHOICE

## 2023-12-21 RX ORDER — FLUTICASONE PROPIONATE 50 MCG
SPRAY, SUSPENSION (ML) NASAL
Qty: 16 G | Refills: 10 | Status: SHIPPED | OUTPATIENT
Start: 2023-12-21

## 2023-12-21 RX ORDER — LORATADINE 10 MG/1
10 TABLET ORAL DAILY
Qty: 28 TABLET | Refills: 10 | Status: SHIPPED | OUTPATIENT
Start: 2023-12-21

## 2023-12-21 RX ORDER — DOCUSATE SODIUM 100 MG/1
100 CAPSULE, LIQUID FILLED ORAL DAILY
Qty: 28 CAPSULE | Refills: 10 | Status: SHIPPED | OUTPATIENT
Start: 2023-12-21 | End: 2024-02-08 | Stop reason: WASHOUT

## 2023-12-21 RX ORDER — TRAZODONE HYDROCHLORIDE 100 MG/1
TABLET ORAL
Qty: 28 TABLET | Refills: 10 | Status: SHIPPED | OUTPATIENT
Start: 2023-12-21

## 2023-12-21 RX ORDER — DULOXETIN HYDROCHLORIDE 60 MG/1
60 CAPSULE, DELAYED RELEASE ORAL DAILY
Qty: 28 CAPSULE | Refills: 10 | Status: SHIPPED | OUTPATIENT
Start: 2023-12-21

## 2023-12-27 ENCOUNTER — TELEMEDICINE (OUTPATIENT)
Dept: PAIN MEDICINE | Facility: CLINIC | Age: 68
End: 2023-12-27
Payer: MEDICARE

## 2023-12-27 VITALS — HEIGHT: 65 IN | BODY MASS INDEX: 46.48 KG/M2 | WEIGHT: 279 LBS

## 2023-12-27 DIAGNOSIS — M54.51 VERTEBROGENIC LOW BACK PAIN: ICD-10-CM

## 2023-12-27 DIAGNOSIS — M54.16 LUMBAR RADICULOPATHY: ICD-10-CM

## 2023-12-27 DIAGNOSIS — M47.816 LUMBAR FACET ARTHROPATHY: Primary | ICD-10-CM

## 2023-12-27 PROCEDURE — 99213 OFFICE O/P EST LOW 20 MIN: CPT | Performed by: STUDENT IN AN ORGANIZED HEALTH CARE EDUCATION/TRAINING PROGRAM

## 2023-12-27 ASSESSMENT — PATIENT HEALTH QUESTIONNAIRE - PHQ9
SUM OF ALL RESPONSES TO PHQ9 QUESTIONS 1 AND 2: 0
2. FEELING DOWN, DEPRESSED OR HOPELESS: NOT AT ALL
1. LITTLE INTEREST OR PLEASURE IN DOING THINGS: NOT AT ALL

## 2023-12-27 ASSESSMENT — PAIN DESCRIPTION - DESCRIPTORS: DESCRIPTORS: STABBING

## 2023-12-27 ASSESSMENT — PAIN SCALES - GENERAL
PAINLEVEL_OUTOF10: 5 - MODERATE PAIN
PAINLEVEL: 5

## 2023-12-27 ASSESSMENT — PAIN - FUNCTIONAL ASSESSMENT: PAIN_FUNCTIONAL_ASSESSMENT: 0-10

## 2023-12-27 NOTE — PROGRESS NOTES
[facility]  Follow Up Office Visit Note 2023    Patient Information: Aubrie Valdivia, MRN: [id number], : 1955   Primary Care/Referring Physician: Chacha Bradley MD, [address] / [place] [address]     Chief Complaint: Low back pain  Interval History: She returns for follow up after her bilateral L4/5, L5/S1 RF ablation    Today she reports at least 50% improvement in her pain. She also feels her balance is significantly improved and is overall very happy with these results. She would definitely have this procedure repeated in the future if her pain worsens.     At baseline she describes primarily axial pain, with radiation to her buttocks, right moreso than left, that worsens significantly when standing and trying to walk    Brief History of Pain: Ms. Aubrie Valdivia is a 68 y.o. female with a PMHx of HTN, T2DM, BALTAZAR, depression, anxiety who presents for evaluation of low back and neck pain.    For reference, she reports pain going from her low back down the back of both legs to the feet. The pain is described as dull constantly, with certain movements causing severe sharp pain. This severe pain happens with twisting, flexion and extension but appears worse with extension. She reports constant numbness in her entire left hand, with her right hand being intermittently numb. Her feet are also constantly numb, which improves when she elevates her feet. She denies any significant burning pain. Of note, she has a history of gastric bypass 2021 with significant weight loss (initially 397 lb, lost about 120 lbs but now weight loss has plateaued).    Current Pain Medications: Duloxetine 60 mg daily, Meloxicam 15 mg daily, Pregabalin 100 mg BID  Previously Tried Pain Medications: Gabapentin 800 mg TID - stopped this and didn't notice any worsening pain    Relevant Surgeries: Denies spine or major joint surgery  Injections: Bilateral L4/5, L5/S1 RF ablation - >50% improvement. L4/5  GHADA - no benefit. Lumbar TFESI w/ Dr. Fajardo without benefit  Physical/Occupational Therapy: She has done PT which she felt made her pain worse    Medications:   Current Outpatient Medications   Medication Instructions    amLODIPine (NORVASC) 10 mg, oral, Every 24 hours    docusate sodium (COLACE) 100 mg, oral, Daily    DULoxetine (CYMBALTA) 60 mg, oral, Daily    fluticasone (Flonase) 50 mcg/actuation nasal spray 1 SPRAY EACH NOSTRIL ONCE A DAY    lisinopril 10 mg, oral, Daily    loratadine (CLARITIN) 10 mg, oral, Daily    meloxicam (MOBIC) 15 mg, oral, Daily    multivit-min/iron/FA/vit K/lut (MULTIVITAMIN WOMEN 50 PLUS ORAL) Every 24 hours    Ozempic 0.25 mg or 0.5 mg (2 mg/3 mL) pen injector 0.5MG SUBQ WEEKLY    pregabalin (Lyrica) 100 mg capsule 1 CAP BY MOUTH TWICE A DAY    sertraline (ZOLOFT) 100 mg, oral, Daily    traZODone (Desyrel) 100 mg tablet 1 TAB BY MOUTH DAILY AT BEDTIME      Allergies: No Known Allergies    Past Medical & Surgical History:  Past Medical History:   Diagnosis Date    Back pain     Hiatal hernia     Hypertension     Lumbosacral radiculitis     Malignant neoplasm of unspecified ovary (CMS/HCC)     Malignant neoplasm of ovary    Osteoarthritis     Ovarian cancer (CMS/HCC)     Prediabetes     SBO (small bowel obstruction) (CMS/HCC)     Sciatica     Umbilical hernia       Past Surgical History:   Procedure Laterality Date    APPENDECTOMY      Appendectomy    BARIATRIC SURGERY  06/01/2021    gastric sleeve- [facility]    CHOLECYSTECTOMY  2012    Cholecystectomy    HYSTERECTOMY  2012    with bilateral oophrectomy    OTHER SURGICAL HISTORY      Resection Of Ovarian Malig. + BSO, Omentectomy, Debulking    OTHER SURGICAL HISTORY      Obstructive bowel    OTHER SURGICAL HISTORY  06/2020    endoscopic SBO repair -        Family History   Problem Relation Name Age of Onset    Alzheimer's disease Mother Bebe     Arthritis Mother Bebe     Cancer Mother Bebe     Heart disease  "Father      Heart disease Sister      Drug abuse Brother Kalen     No Known Problems Daughter       Social History     Socioeconomic History    Marital status:      Spouse name: Not on file    Number of children: Not on file    Years of education: Not on file    Highest education level: Not on file   Occupational History    Not on file   Tobacco Use    Smoking status: Former     Packs/day: .5     Types: Cigarettes     Start date: 1986    Smokeless tobacco: Never   Vaping Use    Vaping Use: Never used   Substance and Sexual Activity    Alcohol use: Yes     Alcohol/week: 7.0 standard drinks of alcohol     Types: 7 Shots of liquor per week     Comment: vodka and lime nightly    Drug use: Not Currently    Sexual activity: Not Currently     Partners: Male   Other Topics Concern    Not on file   Social History Narrative    Not on file     Social Determinants of Health     Financial Resource Strain: Not on file   Food Insecurity: Not on file   Transportation Needs: Not on file   Physical Activity: Not on file   Stress: Not on file   Social Connections: Not on file   Intimate Partner Violence: Not on file   Housing Stability: Not on file       Problems, Past medical history, past surgical history, Medications, allergies, social and family history reviewed and as per the electronic medical record from today's encounter    Review of Systems:  CONST: No fever, chills, fatigue, weight changes  EYES: No loss of vision  ENT: No hearing loss, tinnitus  CV: No chest pain, palpitations  RESP: No dyspnea, shortness of breath, cough  GI: No stool incontinence, nausea, vomiting  : No urinary incontinence  MSK: No joint swelling  SKIN: No rash, no hives  NEURO: No headache, dizziness  PSYCH: No anxiety, depression  HEM/LYMPH: No easy bruising or bleeding  All other systems reviewed are negative     Physical Exam:  Vitals: Ht 1.651 m (5' 5\")   Wt 127 kg (279 lb)   BMI 46.43 kg/m²   Limited by telemedicine  General: No " apparent distress. Alert, appropriate, oriented x 3. Mood generally positive, affect congruent. Speaking in full sentences.   HENT: Normocephalic, atraumatic. Hearing intact.  Lungs: Grossly nonlabored breathing.    Laboratory Data:  The following laboratory data were reviewed during this visit:   Lab Results   Component Value Date    WBC 6.7 11/08/2023    RBC 4.52 11/08/2023    HGB 14.0 11/08/2023    HCT 43.8 11/08/2023     11/08/2023      Lab Results   Component Value Date    INR 1.1 06/20/2020     Lab Results   Component Value Date    CREATININE 0.80 11/08/2023    HGBA1C 5.1 11/08/2023       Imaging:  The following imaging impressions were reviewed by me during this visit:    -4/24/23 lumbar spine MRI shows L2-3 mild-to-moderate central canal narrowing with moderate right neural foramen narrowing. L3-4 severe canal stenosis, severe right and moderate left neural foramen stenosis. L4-5 there is a large central and left paramedian disc herniation with cranial extrusion w/ resultant severe canal stenosis and severe left neural foramen stenosis. L5-S1 there is a slightly right paramedian disc herniation abutting the right S1 nerve root, bilateral foraminal stenosis.    I also personally reviewed the images from the above studies myself. These images and my interpretation of them contributed to the management and decision making of the patient's medical plan.    ASSESSMENT:  Ms. Aubrie Valdivia is a 68 y.o. female with low back pain that is consistent with:    1. Lumbar facet arthropathy    2. Vertebrogenic low back pain    3. Lumbar radiculopathy          PLAN:  Radiology: -I reviewed her 4/24/23 lumbar spine MRI which shows L2-3 mild-to-moderate central canal narrowing with moderate right neural foramen narrowing. L3-4 severe canal stenosis, severe right and moderate left neural foramen stenosis. L4-5 there is a large central and left paramedian disc herniation with cranial extrusion w/ resultant severe canal  stenosis and severe left neural foramen stenosis. L5-S1 there is a slightly right paramedian disc herniation abutting the right S1 nerve root, bilateral foraminal stenosis.  - Given severity of degenerative changes on cervical spine xray could consider MRI in the future    Physically: She has tried PT in the past and it made her pain significantly worse. She appears fairly sedentary and should try to maintain activity as tolerated with the use of assistive devices  - Her weight loss has plateaued after her gastric bypass and she is interested in seeking additional guidance. I referred her to our weight loss specialist for additional evaluation and treatment    Psychologically: No needs at this time    Medication: -Continue Pregabalin 100 mg BID    Duration: Multiple years    Intervention: -I suspect her pain is multi-factorial secondary to lumbar facet arthropathy, vertebrogenic low back pain, lumbar radiculopathy, and lumbar spinal stenosis. Given significant radicular component to her pain she underwent interlaminar GHADA at L4/5 with no benefit. She then underwent bilateral lumbar medial branch nerve RF ablation at L4/5, L5/S1 with greater than 50% improvement.  Will monitor for duration of pain relief and consider repeating in the future   - If no benefit from this could consider Intracept (although has worse pain with extension than flexion). Given severity of her degenerative changes she may need to be referred for surgical evaluation        Sincerely,  Bipin Dougherty MD  [facility] - [place]

## 2024-02-07 ENCOUNTER — TELEPHONE (OUTPATIENT)
Dept: PRIMARY CARE | Facility: CLINIC | Age: 69
End: 2024-02-07
Payer: MEDICARE

## 2024-02-08 ENCOUNTER — OFFICE VISIT (OUTPATIENT)
Dept: PRIMARY CARE | Facility: CLINIC | Age: 69
End: 2024-02-08
Payer: MEDICARE

## 2024-02-08 VITALS
HEIGHT: 65 IN | OXYGEN SATURATION: 98 % | BODY MASS INDEX: 45.65 KG/M2 | HEART RATE: 78 BPM | WEIGHT: 274 LBS | DIASTOLIC BLOOD PRESSURE: 88 MMHG | SYSTOLIC BLOOD PRESSURE: 145 MMHG | TEMPERATURE: 97.6 F | RESPIRATION RATE: 16 BRPM

## 2024-02-08 DIAGNOSIS — E11.9 TYPE 2 DIABETES MELLITUS WITHOUT COMPLICATION, WITHOUT LONG-TERM CURRENT USE OF INSULIN (MULTI): ICD-10-CM

## 2024-02-08 DIAGNOSIS — I10 HTN (HYPERTENSION), BENIGN: Primary | Chronic | ICD-10-CM

## 2024-02-08 DIAGNOSIS — M15.9 PRIMARY OSTEOARTHRITIS INVOLVING MULTIPLE JOINTS: Chronic | ICD-10-CM

## 2024-02-08 DIAGNOSIS — E66.01 MORBID OBESITY WITH BMI OF 45.0-49.9, ADULT (MULTI): Chronic | ICD-10-CM

## 2024-02-08 PROCEDURE — 3079F DIAST BP 80-89 MM HG: CPT | Performed by: NURSE PRACTITIONER

## 2024-02-08 PROCEDURE — 4010F ACE/ARB THERAPY RXD/TAKEN: CPT | Performed by: NURSE PRACTITIONER

## 2024-02-08 PROCEDURE — 3077F SYST BP >= 140 MM HG: CPT | Performed by: NURSE PRACTITIONER

## 2024-02-08 PROCEDURE — 1159F MED LIST DOCD IN RCRD: CPT | Performed by: NURSE PRACTITIONER

## 2024-02-08 PROCEDURE — 3008F BODY MASS INDEX DOCD: CPT | Performed by: NURSE PRACTITIONER

## 2024-02-08 PROCEDURE — 99349 HOME/RES VST EST MOD MDM 40: CPT | Performed by: NURSE PRACTITIONER

## 2024-02-08 PROCEDURE — 1160F RVW MEDS BY RX/DR IN RCRD: CPT | Performed by: NURSE PRACTITIONER

## 2024-02-08 PROCEDURE — 1126F AMNT PAIN NOTED NONE PRSNT: CPT | Performed by: NURSE PRACTITIONER

## 2024-02-08 PROCEDURE — 1036F TOBACCO NON-USER: CPT | Performed by: NURSE PRACTITIONER

## 2024-02-08 RX ORDER — DOCUSATE SODIUM 100 MG/1
100 CAPSULE, LIQUID FILLED ORAL 2 TIMES DAILY
COMMUNITY

## 2024-02-08 ASSESSMENT — PAIN SCALES - GENERAL: PAINLEVEL: 0-NO PAIN

## 2024-02-08 NOTE — PROGRESS NOTES
Subjective   Patient ID: Aubrie Valdivia is a 68 y.o. female who presents for No chief complaint on file..    This is a 68 year old female seen today in her private residence. She is awake and alert with appropriate awareness and is ambulatory with her walker. PMH: Depression, anxiety, DMII, BALTAZAR, Severe osteoarthritis, morbid obesity, lumbosacral radiculitis, neuropathy, Cervical radiculopathy.     Patient seen today at home, in f/u for multiple issues.  Recently started on ozempic injection for weight loss and DMII.  Reports that she has lost 8 lbs thus far.  Denies constipation, nausea and appears to be tolerating well.   Recent visit with pain management Dr. Guerrier with 3rd back injection which she feels has been effective.  Feels that she is ambulating better and has been more active.  Appetite  is reported as good continues to monitor her caloric intake by keeping track via phone poly.  Active with psych services for anxiety/depression which is stable and managed.  HTN - managed and stable with current interventions.  Denies chest pain, palpitations, tachycardia, and shortness of breath, wheezing, no PND/orthopnea, or respiratory complaints for the patient. There is no fever, or chills. No nausea, vomiting, diarrhea, headaches, or vision changes or recent falls. There is no hematuria, dysuria, flank pain, or increased urgency.    Home Visit: Medically necessary due to: dementia/cognitive impairment, unsteady gait/poor balance, shortness of breath with minimal exertion, Illness or condition that results in activity limitation or restriction that impacts the ability to leave home such as: equipment and /or human assistance needed to safely leave the home, patient has limited support systems to help the patient attend office visits, the office visit would require excessive physical effort or pain for the patient.            Current Outpatient Medications:     amLODIPine (Norvasc) 10 mg tablet, Take 1 tablet (10  "mg) by mouth once every 24 hours., Disp: , Rfl:     docusate sodium (Colace) 100 mg capsule, Take 1 capsule (100 mg) by mouth 2 times a day., Disp: , Rfl:     DULoxetine (Cymbalta) 60 mg DR capsule, 1 CAP BY MOUTH ONCE A DAY, Disp: 28 capsule, Rfl: 10    fluticasone (Flonase) 50 mcg/actuation nasal spray, 1 SPRAY EACH NOSTRIL ONCE A DAY, Disp: 16 g, Rfl: 10    lisinopril 10 mg tablet, 1 TAB BY MOUTH ONCE A DAY, Disp: 28 tablet, Rfl: 10    loratadine (Claritin) 10 mg tablet, 1 TAB BY MOUTH ONCE A DAY, Disp: 28 tablet, Rfl: 10    meloxicam (Mobic) 15 mg tablet, 1 TAB BY MOUTH EVERY MORNING, Disp: 28 tablet, Rfl: 10    multivit-min/iron/FA/vit K/lut (MULTIVITAMIN WOMEN 50 PLUS ORAL), once every 24 hours., Disp: , Rfl:     Ozempic 0.25 mg or 0.5 mg (2 mg/3 mL) pen injector, 0.5MG SUBQ WEEKLY, Disp: 3 mL, Rfl: 10    pregabalin (Lyrica) 100 mg capsule, 1 CAP BY MOUTH TWICE A DAY, Disp: 56 capsule, Rfl: 4    sertraline (Zoloft) 100 mg tablet, 1 TAB BY MOUTH ONCE A DAY, Disp: 28 tablet, Rfl: 10    traZODone (Desyrel) 100 mg tablet, 1 TAB BY MOUTH DAILY AT BEDTIME, Disp: 28 tablet, Rfl: 10     Review of Systems  CONSTITUTIONAL denies, fever, chills, night sweats, unexplained weight loss. CARDIOLOGY Denies, palpitations, exertional chest pain, shortness of breath. RESPIRATORY Negative for, persistent cough, hemoptysis, wheezing. GI Positive for, constipation, diarrhea. UROLOGY Denies voiding symptoms. NEUROLOGY Positive for numbness in her hands and now L foot. MUSCULOSKELETAL Positive for BLE weakness, and pain.     Objective   /88   Pulse 78   Temp 36.4 °C (97.6 °F) (Temporal)   Resp 16   Ht 1.651 m (5' 5\")   Wt 124 kg (274 lb)   SpO2 98%   BMI 45.60 kg/m²     Physical Exam  GENERAL APPEARANCE: alert and oriented x 3, Pleasant and cooperative, No Acute Distress.          HEART:  no murmurs regular rate and rhythm.          LUNGS: clear to auscultation bilaterally, no wheezes/rhonchi/rales.          ABDOMEN:  " soft, NT/ND, BS present.          EXTREMITIES: no edema.          SKIN: no rash or skin lesions.          NEUROLOGIC EXAM: decreased sensation of the LLE, antalgic gait.          DIABETIC FOOT EXAM:  no ulcers, pedal pulses 1+ bilaterally.          MUSCULOSKELETAL: pain in neck and lower back.          PSYCH: affect normal.          FUNCTIONAL: Ambulatory with cane/walker.          REPORTS REVIEWED:  office notes.     Lab Results   Component Value Date    WBC 6.7 11/08/2023    HGB 14.0 11/08/2023    HCT 43.8 11/08/2023    MCV 97 11/08/2023     11/08/2023      Lab Results   Component Value Date    GLUCOSE 97 11/08/2023    CALCIUM 9.1 11/08/2023     11/08/2023    K 4.4 11/08/2023    CO2 24 11/08/2023     11/08/2023    BUN 18 11/08/2023    CREATININE 0.80 11/08/2023        Assessment/Plan   Diagnoses and all orders for this visit:  HTN (hypertension), benign  Comments:  Managed - continue Norvasc 10mg daily  Morbid obesity with BMI of 45.0-49.9, adult (CMS/[facility])  Comments:  Ongoing - continue with awareness - monitoring k/parish intake - adding increased movement to daily activities  Type 2 diabetes mellitus without complication, without long-term current use of insulin (CMS/[facility])  Comments:  Continue with ozempic - will f/u with a1c with next home visit  Primary osteoarthritis involving multiple joints  Comments:  Continue with supportive santy - pain managment for injections when needed.      Will continue with [facility] home visits. Active with multiple specialist, however is however has limited support and difficulty getting out for appointments.  Follow up in 3 months and PRN - labs with next home visit     More than 50% of time spent in face-to-face discussion @ a total of 40 minutes with this patient on counseling, coordination of care, collaboration with family and staff and review of medical records and diagnostics.    Johanne العلي, APRN-CNP

## 2024-03-15 DIAGNOSIS — I10 PRIMARY HYPERTENSION: Primary | ICD-10-CM

## 2024-03-16 ENCOUNTER — HOSPITAL ENCOUNTER (EMERGENCY)
Facility: HOSPITAL | Age: 69
Discharge: HOME | End: 2024-03-16
Attending: STUDENT IN AN ORGANIZED HEALTH CARE EDUCATION/TRAINING PROGRAM
Payer: MEDICARE

## 2024-03-16 VITALS
OXYGEN SATURATION: 94 % | WEIGHT: 293 LBS | BODY MASS INDEX: 48.82 KG/M2 | SYSTOLIC BLOOD PRESSURE: 149 MMHG | HEIGHT: 65 IN | DIASTOLIC BLOOD PRESSURE: 81 MMHG | RESPIRATION RATE: 17 BRPM | HEART RATE: 82 BPM | TEMPERATURE: 97.9 F

## 2024-03-16 DIAGNOSIS — R04.0 EPISTAXIS: Primary | ICD-10-CM

## 2024-03-16 LAB
ANION GAP SERPL CALC-SCNC: 10 MMOL/L
BASOPHILS # BLD AUTO: 0.05 X10*3/UL (ref 0–0.1)
BASOPHILS NFR BLD AUTO: 0.7 %
BUN SERPL-MCNC: 22 MG/DL (ref 8–25)
CALCIUM SERPL-MCNC: 9.6 MG/DL (ref 8.5–10.4)
CHLORIDE SERPL-SCNC: 102 MMOL/L (ref 97–107)
CO2 SERPL-SCNC: 25 MMOL/L (ref 24–31)
CREAT SERPL-MCNC: 0.9 MG/DL (ref 0.4–1.6)
EGFRCR SERPLBLD CKD-EPI 2021: 70 ML/MIN/1.73M*2
EOSINOPHIL # BLD AUTO: 0.12 X10*3/UL (ref 0–0.7)
EOSINOPHIL NFR BLD AUTO: 1.7 %
ERYTHROCYTE [DISTWIDTH] IN BLOOD BY AUTOMATED COUNT: 14.3 % (ref 11.5–14.5)
GLUCOSE SERPL-MCNC: 103 MG/DL (ref 65–99)
HCT VFR BLD AUTO: 42.7 % (ref 36–46)
HGB BLD-MCNC: 14.3 G/DL (ref 12–16)
IMM GRANULOCYTES # BLD AUTO: 0.01 X10*3/UL (ref 0–0.7)
IMM GRANULOCYTES NFR BLD AUTO: 0.1 % (ref 0–0.9)
INR PPP: 1.1 (ref 0.9–1.2)
LYMPHOCYTES # BLD AUTO: 0.96 X10*3/UL (ref 1.2–4.8)
LYMPHOCYTES NFR BLD AUTO: 13.4 %
MCH RBC QN AUTO: 32.2 PG (ref 26–34)
MCHC RBC AUTO-ENTMCNC: 33.5 G/DL (ref 32–36)
MCV RBC AUTO: 96 FL (ref 80–100)
MONOCYTES # BLD AUTO: 0.56 X10*3/UL (ref 0.1–1)
MONOCYTES NFR BLD AUTO: 7.8 %
NEUTROPHILS # BLD AUTO: 5.44 X10*3/UL (ref 1.2–7.7)
NEUTROPHILS NFR BLD AUTO: 76.3 %
NRBC BLD-RTO: 0 /100 WBCS (ref 0–0)
PLATELET # BLD AUTO: 155 X10*3/UL (ref 150–450)
POTASSIUM SERPL-SCNC: 3.9 MMOL/L (ref 3.4–5.1)
PROTHROMBIN TIME: 10.9 SECONDS (ref 9.3–12.7)
RBC # BLD AUTO: 4.44 X10*6/UL (ref 4–5.2)
SODIUM SERPL-SCNC: 137 MMOL/L (ref 133–145)
WBC # BLD AUTO: 7.1 X10*3/UL (ref 4.4–11.3)

## 2024-03-16 PROCEDURE — 80048 BASIC METABOLIC PNL TOTAL CA: CPT | Performed by: PHYSICIAN ASSISTANT

## 2024-03-16 PROCEDURE — 30901 CONTROL OF NOSEBLEED: CPT

## 2024-03-16 PROCEDURE — 85025 COMPLETE CBC W/AUTO DIFF WBC: CPT | Performed by: PHYSICIAN ASSISTANT

## 2024-03-16 PROCEDURE — 99283 EMERGENCY DEPT VISIT LOW MDM: CPT | Mod: 25

## 2024-03-16 PROCEDURE — 2500000001 HC RX 250 WO HCPCS SELF ADMINISTERED DRUGS (ALT 637 FOR MEDICARE OP): Performed by: PHYSICIAN ASSISTANT

## 2024-03-16 PROCEDURE — 85610 PROTHROMBIN TIME: CPT | Performed by: PHYSICIAN ASSISTANT

## 2024-03-16 PROCEDURE — 36415 COLL VENOUS BLD VENIPUNCTURE: CPT | Performed by: PHYSICIAN ASSISTANT

## 2024-03-16 RX ORDER — SILVER NITRATE 38.21; 12.74 MG/1; MG/1
STICK TOPICAL ONCE
Status: COMPLETED | OUTPATIENT
Start: 2024-03-16 | End: 2024-03-16

## 2024-03-16 RX ORDER — OXYMETAZOLINE HCL 0.05 %
2 SPRAY, NON-AEROSOL (ML) NASAL ONCE
Status: COMPLETED | OUTPATIENT
Start: 2024-03-16 | End: 2024-03-16

## 2024-03-16 RX ADMIN — OXYMETAZOLINE HYDROCHLORIDE 2 SPRAY: 0.05 SPRAY NASAL at 20:46

## 2024-03-16 RX ADMIN — SILVER NITRATE APPLICATORS 1 APPLICATION: 25; 75 STICK TOPICAL at 21:50

## 2024-03-16 ASSESSMENT — COLUMBIA-SUICIDE SEVERITY RATING SCALE - C-SSRS
6. HAVE YOU EVER DONE ANYTHING, STARTED TO DO ANYTHING, OR PREPARED TO DO ANYTHING TO END YOUR LIFE?: NO
1. IN THE PAST MONTH, HAVE YOU WISHED YOU WERE DEAD OR WISHED YOU COULD GO TO SLEEP AND NOT WAKE UP?: NO
2. HAVE YOU ACTUALLY HAD ANY THOUGHTS OF KILLING YOURSELF?: NO

## 2024-03-16 ASSESSMENT — PAIN SCALES - GENERAL: PAINLEVEL_OUTOF10: 0 - NO PAIN

## 2024-03-16 ASSESSMENT — PAIN - FUNCTIONAL ASSESSMENT: PAIN_FUNCTIONAL_ASSESSMENT: 0-10

## 2024-03-17 NOTE — ED PROVIDER NOTES
HPI   Chief Complaint   Patient presents with    Epistaxis (Nose Bleed)     Pt was sitting and had a spontaneous nose bleed at approximately 2000. Pt compliant with all her medications. Pt was brought in by EMS. Pt has clamp on nose.        HPI     Patient is a 68-year-old female presenting for evaluation of an acute nosebleed.  Patient states this started approximately 1 hour prior to her arrival, happening spontaneously.  Patient was just seen on Wednesday at the [facility] for the same thing.  The bleeding was able to be controlled at that time.  Patient attempted to call to get in with an ENT, however they will not be available for the next 6 months according to the patient.  She is not on blood thinning medications.  She denies trauma to the nose.  She denies associated chest pain, shortness of breath, abdominal pain, nausea, vomiting, diarrhea or constipation.               No data recorded                   Patient History   Past Medical History:   Diagnosis Date    Back pain     Hiatal hernia     Hypertension     Lumbosacral radiculitis     Malignant neoplasm of unspecified ovary (CMS/HCC)     Malignant neoplasm of ovary    Osteoarthritis     Ovarian cancer (CMS/HCC)     Prediabetes     SBO (small bowel obstruction) (CMS/HCC)     Sciatica     Umbilical hernia      Past Surgical History:   Procedure Laterality Date    APPENDECTOMY      Appendectomy    BARIATRIC SURGERY  06/01/2021    gastric sleeve- [facility]    CHOLECYSTECTOMY  2012    Cholecystectomy    HYSTERECTOMY  2012    with bilateral oophrectomy    OTHER SURGICAL HISTORY      Resection Of Ovarian Malig. + BSO, Omentectomy, Debulking    OTHER SURGICAL HISTORY      Obstructive bowel    OTHER SURGICAL HISTORY  06/2020    endoscopic SBO repair -      Family History   Problem Relation Name Age of Onset    Alzheimer's disease Mother Bebe     Arthritis Mother Bebe     Cancer Mother Bebe     Heart disease Father      Heart  disease Sister      Drug abuse Brother Kalen     No Known Problems Daughter       Social History     Tobacco Use    Smoking status: Former     Packs/day: .5     Types: Cigarettes     Start date: 1986    Smokeless tobacco: Never   Vaping Use    Vaping Use: Never used   Substance Use Topics    Alcohol use: Yes     Alcohol/week: 7.0 standard drinks of alcohol     Types: 7 Shots of liquor per week     Comment: vodka and lime nightly    Drug use: Not Currently       Physical Exam   ED Triage Vitals [03/16/24 2035]   Temperature Heart Rate Respirations BP   36.6 °C (97.9 °F) 99 16 (!) 159/98      Pulse Ox Temp Source Heart Rate Source Patient Position   97 % Temporal Monitor Sitting      BP Location FiO2 (%)     Left arm --       Physical Exam    GENERAL: Well nourished and well developed. Answers questions appropriately.    SKIN: Clean and dry without evidence of rashes.    HEENT: Skull normocephalic, atraumatic. No scleral icterus. PERRLA, with EOMI.  Mucous membranes moist and pink in color. Supple neck, trachea midline. No lymphadenopathy or masses.  Clamp applied to nose, evidence of blood draining down the back of the pharynx.  No blood streaming from the naris.    RESPIRATORY: Clear to ausculation with normal effort. No adventitious sounds, intercostal retractions or shallow breathing.    CARDIOVASCULAR: Regular rate and rhythm with normal S1, S2. No S3, S4, murmurs or gallops. Capillary refill brisk, less than 3 seconds bilaterally. No unilateral lower extremity edema.    ABD/: Soft, nontender abdomen. Bowel sounds equal in all four quadrants. No tenderness, rebound, guarding or rigidity.    MSK: Spontaneously moves all 4 extremities without difficulty.  No injury or obvious deformity.      NEURO/PSYCH: A&Ox3. Cranial nerves II-XII grossly intact. No focal motor or sensory deficits. Appropriate mood and behavior.    ED Course & MDM   ED Course as of 03/16/24 2224   Sat Mar 16, 2024   2105 First dose of Afrin  have been applied with nasal clamping.  Patient no longer feeling blood down the back of her throat.  No active bleeding at this time. [RP]   2130 Second dose of Afrin was applied by myself with nasal clamping.  Still no active bleeding.  Patient requesting to speak to physician. [RP]   2159 Silver nitrate cauterization performed by attending physician at this time at the bedside. [RP]      ED Course User Index  [RP] Lilliana Kevin PA-C         Diagnoses as of 03/16/24 2224   Epistaxis       Medical Decision Making  Parts of this chart have been completed using voice recognition software. Please excuse any errors of transcription. Despite the medical decision making time stamp above-my medical decision making has taken place during the patient's entire visit. My thought process and reason for plan has been formulated from the time that I saw the patient until the time of disposition and is not specific to one specific moment during their visit and furthermore my MDM encompasses this entire chart and not only this text box.      HPI: Detailed above.    Exam: A medically appropriate exam performed, outlined above, given the known history and presentation.    History obtained from: Patient    Social Determinants of Health considered during this visit: Age    Labs Reviewed   CBC WITH AUTO DIFFERENTIAL - Abnormal       Result Value    WBC 7.1      nRBC 0.0      RBC 4.44      Hemoglobin 14.3      Hematocrit 42.7      MCV 96      MCH 32.2      MCHC 33.5      RDW 14.3      Platelets 155      Neutrophils % 76.3      Immature Granulocytes %, Automated 0.1      Lymphocytes % 13.4      Monocytes % 7.8      Eosinophils % 1.7      Basophils % 0.7      Neutrophils Absolute 5.44      Immature Granulocytes Absolute, Automated 0.01      Lymphocytes Absolute 0.96 (*)     Monocytes Absolute 0.56      Eosinophils Absolute 0.12      Basophils Absolute 0.05     BASIC METABOLIC PANEL   PROTIME-INR        Medications   oxymetazoline  (Afrin) 0.05 % nasal spray 2 spray (2 sprays Each Nostril Given 3/16/24 2046)   silver nitrate applicators applicator (1 Application Topical Given 3/16/24 2150)        Differential diagnoses considered for this visit include: Epistaxis    Considerations/further MDM:    Patient is a 68-year-old female presenting for evaluation of acute epistaxis.  Afrin was ordered and administered to the nostrils with pressure applied with a nasal clamp.  Since the patient had suffered a nosebleed 4 out of the past 5 days, blood work was ordered.    10:24pm - patient was handed off to my oncoming attending physician Dr. Steff Avery at this time.  See further documentation for final disposition and planning.    Attending physician Dr. Toñito Way was available for consultation.  Patient's history, physical exam, diagnostic studies, and treatment plan were discussed thoroughly.    Procedure  Procedures     Lilliana Kevin PA-C  03/16/24 1686

## 2024-03-17 NOTE — ED PROVIDER NOTES
Patient was seen by both myself and advanced practitioner.  I personally saw the patient and made/approved the management plan and take responsibility for the patient management     Patient is a 68-year-old female that presents emergency department for evaluation of nosebleed.  Patient states that she started having nosebleeds beginning earlier this week.  They come on spontaneously for the last 5 days and she was seen 3 days ago at emergency department in [facility] where she received inhaled TXA with improvement of her nosebleeding.  She had another episode tonight which was significant where she felt blood running on the back of her throat as well as mild nausea.  She is concerned due to the increasing frequency of these nosebleeds.  She is not on any blood thinners.    On exam patient uncomfortable appearing but in no obvious distress.  She does have a lot of dried blood on the nose as well as blood in the back of her throat.  No active bleeding at this time.  Afrin was applied and nasal clamp was then applied to the nose.  This was left for extended period of time.  Vital signs are stable although she is mildly hypertensive with a blood pressure of 159/98.  After removing the nasal clamp nares were inspected and there is a small area on the septum of the right nare.  Given the recurrent bleeds and in discussion with the patient blood work is ordered to evaluate patient's hemoglobin, platelet count as well as INR.  Case signed out to oncoming physician pending blood work results and reevaluation.     Toñito Hodges, DO  03/16/24 0669

## 2024-03-18 RX ORDER — AMLODIPINE BESYLATE 10 MG/1
10 TABLET ORAL DAILY
Qty: 28 TABLET | Refills: 10 | Status: SHIPPED | OUTPATIENT
Start: 2024-03-18

## 2024-03-19 ENCOUNTER — TELEPHONE (OUTPATIENT)
Dept: PRIMARY CARE | Facility: CLINIC | Age: 69
End: 2024-03-19
Payer: MEDICARE

## 2024-03-19 NOTE — TELEPHONE ENCOUNTER
Patient returning call to office, COVID Screening negative, Verbal consent received via phone with patient.

## 2024-03-20 ENCOUNTER — OFFICE VISIT (OUTPATIENT)
Dept: PRIMARY CARE | Facility: CLINIC | Age: 69
End: 2024-03-20
Payer: MEDICARE

## 2024-03-20 VITALS
TEMPERATURE: 96 F | BODY MASS INDEX: 44.98 KG/M2 | OXYGEN SATURATION: 97 % | SYSTOLIC BLOOD PRESSURE: 122 MMHG | WEIGHT: 270 LBS | HEIGHT: 65 IN | DIASTOLIC BLOOD PRESSURE: 68 MMHG | HEART RATE: 78 BPM | RESPIRATION RATE: 16 BRPM

## 2024-03-20 DIAGNOSIS — B36.9 FUNGAL RASH OF TORSO: Chronic | ICD-10-CM

## 2024-03-20 DIAGNOSIS — Z79.4 TYPE 2 DIABETES MELLITUS WITH OTHER SPECIFIED COMPLICATION, WITH LONG-TERM CURRENT USE OF INSULIN (MULTI): Primary | ICD-10-CM

## 2024-03-20 DIAGNOSIS — I10 HTN (HYPERTENSION), BENIGN: Chronic | ICD-10-CM

## 2024-03-20 DIAGNOSIS — E11.69 TYPE 2 DIABETES MELLITUS WITH OTHER SPECIFIED COMPLICATION, WITH LONG-TERM CURRENT USE OF INSULIN (MULTI): Primary | ICD-10-CM

## 2024-03-20 DIAGNOSIS — R04.0 BLEEDING NOSE: Chronic | ICD-10-CM

## 2024-03-20 PROCEDURE — 4010F ACE/ARB THERAPY RXD/TAKEN: CPT | Performed by: NURSE PRACTITIONER

## 2024-03-20 PROCEDURE — 3074F SYST BP LT 130 MM HG: CPT | Performed by: NURSE PRACTITIONER

## 2024-03-20 PROCEDURE — 99349 HOME/RES VST EST MOD MDM 40: CPT | Performed by: NURSE PRACTITIONER

## 2024-03-20 PROCEDURE — 3078F DIAST BP <80 MM HG: CPT | Performed by: NURSE PRACTITIONER

## 2024-03-20 PROCEDURE — 1160F RVW MEDS BY RX/DR IN RCRD: CPT | Performed by: NURSE PRACTITIONER

## 2024-03-20 PROCEDURE — 1125F AMNT PAIN NOTED PAIN PRSNT: CPT | Performed by: NURSE PRACTITIONER

## 2024-03-20 PROCEDURE — 3008F BODY MASS INDEX DOCD: CPT | Performed by: NURSE PRACTITIONER

## 2024-03-20 PROCEDURE — 1159F MED LIST DOCD IN RCRD: CPT | Performed by: NURSE PRACTITIONER

## 2024-03-20 PROCEDURE — 1036F TOBACCO NON-USER: CPT | Performed by: NURSE PRACTITIONER

## 2024-03-20 RX ORDER — NYSTATIN 100000 U/G
1 CREAM TOPICAL 2 TIMES DAILY
COMMUNITY
End: 2024-03-26 | Stop reason: SDUPTHER

## 2024-03-20 ASSESSMENT — PAIN SCALES - GENERAL: PAINLEVEL: 5

## 2024-03-20 NOTE — PROGRESS NOTES
Subjective   Patient ID: Aubrie Valdivia is a 68 y.o. female who presents for No chief complaint on file..    This is a 68 year old female seen today in her private residence. She is awake and alert with appropriate awareness and is ambulatory with her walker. PMH: Depression, anxiety, DMII, BALTAZAR, Severe osteoarthritis, morbid obesity, lumbosacral radiculitis, neuropathy, Cervical radiculopathy.     Post Acute:  3/13 & 3/16 Per EMR: presents emergency department for nose bleed. Patient is having right-sided nosebleed at this time. Patient states that she has been bleeding for the past 2 to 3 days. Patient noted that today it seemed to get worse today. Patient states that she felt a gushing down her face. Patient notes that she is not on any blood thinners. Patient states that she does have a history of high blood pressure. She is on lisinopril and amlodipine. Patient states she took home meds this morning.  Patient's never had issues with the nosebleed in the past. Patient denies any trauma.  Reported that nose was cauterized and discharged to home.    Today she reports Saturday a slight nose bleed for which she used afrin nose spray which was effective.  Continues to monitor diet and use of ozempic.  Admits that she is discouraged as she no weight loss - currently loss of 4 lbs in 2 months.  Patient seen today at home, in f/u for multiple issues.  Recently started on ozempic injection for weight loss and DMII.  Denies constipation, nausea and appears to be tolerating well.   Recent visit with pain management Dr. Guerrier with 3rd back injection which she feels has been effective.  Feels that she is ambulating better and has been more active.  Appetite  is reported as good continues to monitor her caloric intake by keeping track via phone poly.  Active with psych services for anxiety/depression which is stable and managed.  HTN - managed and stable with current interventions.  Denies chest pain, palpitations,  tachycardia, and shortness of breath, wheezing, no PND/orthopnea, or respiratory complaints for the patient. There is no fever, or chills. No nausea, vomiting, diarrhea, headaches, or vision changes or recent falls. There is no hematuria, dysuria, flank pain, or increased urgency.    Home Visit: Medically necessary due to: dementia/cognitive impairment, unsteady gait/poor balance, shortness of breath with minimal exertion, Illness or condition that results in activity limitation or restriction that impacts the ability to leave home such as: equipment and /or human assistance needed to safely leave the home, patient has limited support systems to help the patient attend office visits, the office visit would require excessive physical effort or pain for the patient.         Current Outpatient Medications:     amLODIPine (Norvasc) 10 mg tablet, 1 TAB BY MOUTH ONCE A DAY, Disp: 28 tablet, Rfl: 10    docusate sodium (Colace) 100 mg capsule, Take 1 capsule (100 mg) by mouth 2 times a day., Disp: , Rfl:     DULoxetine (Cymbalta) 60 mg DR capsule, 1 CAP BY MOUTH ONCE A DAY, Disp: 28 capsule, Rfl: 10    fluticasone (Flonase) 50 mcg/actuation nasal spray, 1 SPRAY EACH NOSTRIL ONCE A DAY, Disp: 16 g, Rfl: 10    lisinopril 10 mg tablet, 1 TAB BY MOUTH ONCE A DAY, Disp: 28 tablet, Rfl: 10    loratadine (Claritin) 10 mg tablet, 1 TAB BY MOUTH ONCE A DAY, Disp: 28 tablet, Rfl: 10    meloxicam (Mobic) 15 mg tablet, 1 TAB BY MOUTH EVERY MORNING, Disp: 28 tablet, Rfl: 10    multivit-min/iron/FA/vit K/lut (MULTIVITAMIN WOMEN 50 PLUS ORAL), once every 24 hours., Disp: , Rfl:     nystatin (Mycostatin) cream, Apply 1 Application topically 2 times a day., Disp: , Rfl:     pregabalin (Lyrica) 100 mg capsule, 1 CAP BY MOUTH TWICE A DAY, Disp: 56 capsule, Rfl: 4    semaglutide 0.25 mg or 0.5 mg (2 mg/3 mL) pen injector, Inject 1 mg under the skin 1 (one) time per week., Disp: 1 mL, Rfl: 2    sertraline (Zoloft) 100 mg tablet, 1 TAB BY MOUTH  "ONCE A DAY, Disp: 28 tablet, Rfl: 10    traZODone (Desyrel) 100 mg tablet, 1 TAB BY MOUTH DAILY AT BEDTIME, Disp: 28 tablet, Rfl: 10     Review of Systems  CONSTITUTIONAL denies, fever, chills, night sweats, unexplained weight loss. CARDIOLOGY Denies, palpitations, exertional chest pain, shortness of breath. RESPIRATORY Negative for, persistent cough, hemoptysis, wheezing. GI Positive for, constipation, diarrhea. UROLOGY Denies voiding symptoms. NEUROLOGY Positive for numbness in her hands and now L foot. MUSCULOSKELETAL Positive for BLE weakness, and pain.   Objective   /68 (BP Location: Left arm)   Pulse 78   Temp 35.6 °C (96 °F) (Temporal)   Resp 16   Ht 1.651 m (5' 5\")   Wt 122 kg (270 lb)   SpO2 97%   BMI 44.93 kg/m²     Physical Exam  GENERAL APPEARANCE: alert and oriented x 3, Pleasant and cooperative, No Acute Distress.          HEART:  no murmurs regular rate and rhythm.          LUNGS: clear to auscultation bilaterally, no wheezes/rhonchi/rales.          ABDOMEN:  soft, NT/ND, BS present.          EXTREMITIES: no edema.          SKIN: no rash or skin lesions.          NEUROLOGIC EXAM: decreased sensation of the LLE, antalgic gait.          DIABETIC FOOT EXAM:  no ulcers, pedal pulses 1+ bilaterally.          MUSCULOSKELETAL: pain in neck and lower back.          PSYCH: affect normal.          FUNCTIONAL: Ambulatory with cane/walker.          REPORTS REVIEWED:  office notes.   Assessment/Plan   Diagnoses and all orders for this visit:  Type 2 diabetes mellitus with other specified complication, with long-term current use of insulin (CMS/[facility])  Comments:  Inrease ozempid to 1 mg weekly  Orders:  -     semaglutide 0.25 mg or 0.5 mg (2 mg/3 mL) pen injector; Inject 1 mg under the skin 1 (one) time per week.  HTN (hypertension), benign  Comments:  Managed - continue lisinopril 10mg daily, amlodipine 10mg daily  Fungal rash of torso  Comments:  Continue with nystatin cream daily PRN  Bleeding " nose  Comments:  start afrin for bleeding when needed    Will continue with [facility] home visits. Active with multiple specialist, however is however has limited support and difficulty getting out for appointments.         DMII/Weight loss - Increase ozempic to 1g weekly x 4 weeks then increase 1.7mg weekly x 4 weeks - then 2.4mg weekly x 4 weeks    Johanne العلي, APRN-CNP

## 2024-03-23 PROBLEM — R04.0 BLEEDING NOSE: Status: ACTIVE | Noted: 2024-03-23

## 2024-03-26 ENCOUNTER — TELEPHONE (OUTPATIENT)
Dept: PRIMARY CARE | Facility: CLINIC | Age: 69
End: 2024-03-26
Payer: MEDICARE

## 2024-03-26 DIAGNOSIS — B36.9 FUNGAL RASH OF TORSO: Primary | ICD-10-CM

## 2024-03-26 DIAGNOSIS — E11.9 TYPE 2 DIABETES MELLITUS WITHOUT COMPLICATION, UNSPECIFIED WHETHER LONG TERM INSULIN USE (MULTI): Primary | ICD-10-CM

## 2024-03-26 RX ORDER — NYSTATIN 100000 U/G
1 CREAM TOPICAL 2 TIMES DAILY
Qty: 60 G | Refills: 1 | Status: SHIPPED | OUTPATIENT
Start: 2024-03-26 | End: 2025-03-26

## 2024-03-26 NOTE — TELEPHONE ENCOUNTER
Gian from [facility] [phone] phoned re: Ozempic Rx. She stated the current Rx is for 1 box that only lasts 14 days. She is asking that the Rx is changed to the next dose up which is Ozempic 4mg/3ml.

## 2024-03-26 NOTE — TELEPHONE ENCOUNTER
Aubrie would like you to order the silver nitrate for her nose as it was not filled with the order previously sent.

## 2024-03-27 DIAGNOSIS — B36.9 FUNGAL RASH OF TORSO: Primary | ICD-10-CM

## 2024-03-27 DIAGNOSIS — E11.69 TYPE 2 DIABETES MELLITUS WITH OTHER SPECIFIED COMPLICATION, WITHOUT LONG-TERM CURRENT USE OF INSULIN (MULTI): Primary | ICD-10-CM

## 2024-03-27 RX ORDER — KETOCONAZOLE 20 MG/G
1 CREAM TOPICAL DAILY
COMMUNITY
End: 2024-03-27 | Stop reason: SDUPTHER

## 2024-03-27 RX ORDER — KETOCONAZOLE 20 MG/G
1 CREAM TOPICAL DAILY
Qty: 120 G | Refills: 0 | Status: SHIPPED | OUTPATIENT
Start: 2024-03-27 | End: 2024-06-11

## 2024-04-02 ENCOUNTER — APPOINTMENT (OUTPATIENT)
Dept: OTOLARYNGOLOGY | Facility: CLINIC | Age: 69
End: 2024-04-02
Payer: MEDICARE

## 2024-04-10 ENCOUNTER — OFFICE VISIT (OUTPATIENT)
Dept: OTOLARYNGOLOGY | Facility: CLINIC | Age: 69
End: 2024-04-10
Payer: MEDICARE

## 2024-04-10 VITALS — TEMPERATURE: 97.5 F | HEIGHT: 65 IN | BODY MASS INDEX: 44.93 KG/M2

## 2024-04-10 DIAGNOSIS — R04.0 EPISTAXIS: Primary | ICD-10-CM

## 2024-04-10 DIAGNOSIS — J34.2 DEVIATED NASAL SEPTUM: ICD-10-CM

## 2024-04-10 PROCEDURE — 3008F BODY MASS INDEX DOCD: CPT | Performed by: OTOLARYNGOLOGY

## 2024-04-10 PROCEDURE — 1036F TOBACCO NON-USER: CPT | Performed by: OTOLARYNGOLOGY

## 2024-04-10 PROCEDURE — 4010F ACE/ARB THERAPY RXD/TAKEN: CPT | Performed by: OTOLARYNGOLOGY

## 2024-04-10 PROCEDURE — 99203 OFFICE O/P NEW LOW 30 MIN: CPT | Performed by: OTOLARYNGOLOGY

## 2024-04-10 PROCEDURE — 1160F RVW MEDS BY RX/DR IN RCRD: CPT | Performed by: OTOLARYNGOLOGY

## 2024-04-10 PROCEDURE — 31231 NASAL ENDOSCOPY DX: CPT | Performed by: OTOLARYNGOLOGY

## 2024-04-10 PROCEDURE — 1159F MED LIST DOCD IN RCRD: CPT | Performed by: OTOLARYNGOLOGY

## 2024-04-10 NOTE — PROGRESS NOTES
Chief Complaint   Patient presents with    New Patient Visit     NP NPOSEBLEEDS RT SIDE      Date of Evaluation: 4/10/2024   HPI  Aubrie Valdivia is a 68 y.o. female with recurrent right anterior and posterior epistaxis.  She was seen in the emergency room several times in March.  The last bleeding episode was 2 weeks ago.  She denies any blood thinners.  No difficulty breathing through the nose.  At her last ER visit she had the nose cauterized.  She had some bleeding following this but none for 2 weeks       Past Medical History:   Diagnosis Date    Back pain     Hiatal hernia     Hypertension     Lumbosacral radiculitis     Malignant neoplasm of unspecified ovary (CMS/HCC) 2011    Malignant neoplasm of ovary    Osteoarthritis     Ovarian cancer (CMS/HCC)     Prediabetes     SBO (small bowel obstruction) (CMS/HCC)     Sciatica     Umbilical hernia       Past Surgical History:   Procedure Laterality Date    APPENDECTOMY      Appendectomy    BARIATRIC SURGERY  06/01/2021    gastric sleeve- [facility]    CHOLECYSTECTOMY  2012    Cholecystectomy    HYSTERECTOMY  2012    with bilateral oophrectomy    OTHER SURGICAL HISTORY      Resection Of Ovarian Malig. + BSO, Omentectomy, Debulking    OTHER SURGICAL HISTORY      Obstructive bowel    OTHER SURGICAL HISTORY  06/2020    endoscopic SBO repair -           Medications:   Current Outpatient Medications   Medication Instructions    amLODIPine (NORVASC) 10 mg, oral, Daily    docusate sodium (COLACE) 100 mg, oral, 2 times daily    DULoxetine (CYMBALTA) 60 mg, oral, Daily    fluticasone (Flonase) 50 mcg/actuation nasal spray 1 SPRAY EACH NOSTRIL ONCE A DAY    ketoconazole (NIZOral) 2 % cream 1 Application, Topical, Daily    lisinopril 10 mg, oral, Daily    loratadine (CLARITIN) 10 mg, oral, Daily    meloxicam (MOBIC) 15 mg, oral, Daily    multivit-min/iron/FA/vit K/lut (MULTIVITAMIN WOMEN 50 PLUS ORAL) Every 24 hours    nystatin (Mycostatin) cream 1 Application,  "Topical, 2 times daily    pregabalin (Lyrica) 100 mg capsule 1 CAP BY MOUTH TWICE A DAY    semaglutide (OZEMPIC) 1 mg, subcutaneous, Once Weekly    sertraline (ZOLOFT) 100 mg, oral, Daily    traZODone (Desyrel) 100 mg tablet 1 TAB BY MOUTH DAILY AT BEDTIME        Allergies:  No Known Allergies     Physical Exam:  Last Recorded Vitals  Temperature 36.4 °C (97.5 °F), height 1.651 m (5' 5\").  []General appearance: Well-developed, well-nourished in no acute distress, conversant with normal voice quality    Head/face: No erythema or edema or facial tenderness, and normal facial nerve function bilaterally    External ear: Clear external auditory canals with normal pinnae  Tube status: N/A  Middle ear: Tympanic membranes intact and mobile, middle ears normal.  Tympanic membrane perforation: N/A  Mastoid bowl: N/A  Hearing: Normal conversational awareness at normal speech thresholds    Nose visualized using: Nasal endoscopy  Nasal dorsum: Nontraumatic midline appearance  Septum: Deviated septum.  Cauterized areas healing well on the septum.  No active bleeding  Inferior turbinates: Normal, pink  Secretions: Dry    Oral cavity and oropharynx: Normal  Teeth: Good condition  Floor of mouth: without lesions  Palate: Normal hard palate, soft palate and uvula  Oropharynx: Clear, no lesions present  Buccal mucosa: Normal without masses or lesions  Lips: Normal    Nasopharynx: Inadequate mirror exam secondary to gag/anatomy    Neck:  Salivary glands: Normal bilateral parotid and submandibular glands by inspection and palpation.  Non-thyroid masses: No palpable masses or significant lymphadenopathy  Trachea: Midline  Thyroid: No thyromegaly or palpable nodules  Temporomandibular joint: Nontender  Cervical range of motion: Normal    Neurologic exam: Alert and oriented x3, appropriate affect.  Cranial nerves II-XII normal bilaterally  Extraocular movement: Extraocular movement intact, normal gaze alignment        Aubrie was seen today " for new patient visit.  Diagnoses and all orders for this visit:  Epistaxis (Primary)  Deviated nasal septum       PLAN  The bleeding source seems to have been addressed with a cauterization.  No active bleeding at this point.  I have recommended saline nasal gel.  Recheck as needed    Bigg Payne MD

## 2024-04-15 DIAGNOSIS — M54.17 LUMBOSACRAL RADICULITIS: ICD-10-CM

## 2024-04-15 DIAGNOSIS — E11.69 TYPE 2 DIABETES MELLITUS WITH OTHER SPECIFIED COMPLICATION, WITHOUT LONG-TERM CURRENT USE OF INSULIN (MULTI): ICD-10-CM

## 2024-04-15 RX ORDER — SEMAGLUTIDE 1.34 MG/ML
INJECTION, SOLUTION SUBCUTANEOUS
Qty: 3 ML | Refills: 11 | Status: SHIPPED | OUTPATIENT
Start: 2024-04-15

## 2024-04-15 RX ORDER — PREGABALIN 100 MG/1
CAPSULE ORAL
Qty: 56 CAPSULE | Refills: 2 | Status: SHIPPED | OUTPATIENT
Start: 2024-04-15

## 2024-05-22 ENCOUNTER — TELEPHONE (OUTPATIENT)
Dept: PRIMARY CARE | Facility: CLINIC | Age: 69
End: 2024-05-22
Payer: MEDICARE

## 2024-05-23 ENCOUNTER — OFFICE VISIT (OUTPATIENT)
Dept: PRIMARY CARE | Facility: CLINIC | Age: 69
End: 2024-05-23
Payer: MEDICARE

## 2024-05-23 VITALS
RESPIRATION RATE: 16 BRPM | SYSTOLIC BLOOD PRESSURE: 133 MMHG | OXYGEN SATURATION: 96 % | HEIGHT: 65 IN | DIASTOLIC BLOOD PRESSURE: 68 MMHG | HEART RATE: 68 BPM | TEMPERATURE: 96.7 F | WEIGHT: 293 LBS | BODY MASS INDEX: 48.82 KG/M2

## 2024-05-23 DIAGNOSIS — M47.26 OSTEOARTHRITIS OF SPINE WITH RADICULOPATHY, LUMBAR REGION: ICD-10-CM

## 2024-05-23 DIAGNOSIS — E11.9 TYPE 2 DIABETES MELLITUS WITHOUT COMPLICATION, WITHOUT LONG-TERM CURRENT USE OF INSULIN (MULTI): ICD-10-CM

## 2024-05-23 DIAGNOSIS — I10 HTN (HYPERTENSION), BENIGN: Primary | ICD-10-CM

## 2024-05-23 DIAGNOSIS — E66.01 MORBID OBESITY WITH BMI OF 45.0-49.9, ADULT (MULTI): ICD-10-CM

## 2024-05-23 PROCEDURE — 1125F AMNT PAIN NOTED PAIN PRSNT: CPT | Performed by: NURSE PRACTITIONER

## 2024-05-23 PROCEDURE — 3075F SYST BP GE 130 - 139MM HG: CPT | Performed by: NURSE PRACTITIONER

## 2024-05-23 PROCEDURE — 99349 HOME/RES VST EST MOD MDM 40: CPT | Performed by: NURSE PRACTITIONER

## 2024-05-23 PROCEDURE — 3008F BODY MASS INDEX DOCD: CPT | Performed by: NURSE PRACTITIONER

## 2024-05-23 PROCEDURE — 3078F DIAST BP <80 MM HG: CPT | Performed by: NURSE PRACTITIONER

## 2024-05-23 PROCEDURE — 1160F RVW MEDS BY RX/DR IN RCRD: CPT | Performed by: NURSE PRACTITIONER

## 2024-05-23 PROCEDURE — 1159F MED LIST DOCD IN RCRD: CPT | Performed by: NURSE PRACTITIONER

## 2024-05-23 PROCEDURE — 4010F ACE/ARB THERAPY RXD/TAKEN: CPT | Performed by: NURSE PRACTITIONER

## 2024-05-23 ASSESSMENT — PAIN SCALES - GENERAL: PAINLEVEL: 3

## 2024-05-23 NOTE — PROGRESS NOTES
Subjective   Patient ID: Aubrie Valdivia is a 68 y.o. female who presents for Follow-up (DMII, Obesity, HTN).    This is a 68 year old female seen today in her private residence. She is awake and alert with appropriate awareness and is ambulatory with her walker. PMH: Depression, anxiety, DMII, BALTAZAR, Severe osteoarthritis, morbid obesity, lumbosacral radiculitis, neuropathy, Cervical radiculopathy.     Today is a follow up for HTN, DMII, epistaxis.  Continues to monitor diet and use of ozempic injection recently increased to 2mg weekly, waiting on delivery from pharmacy.  Admits that she has noticed that her scale is broken.  States a friend came over used the scale and it recorded the same weight at her.  New scale obtained and she actual has lost weight, and has noticed her clothing fitting better.   Recently started on ozempic injection for weight loss and DMII.  Denies constipation, nausea and appears to be tolerating well.   Recent visit with pain management Dr. Guerrier with 3rd back injection which she feels has been effective.  Feels that she is ambulating better and has been more active.  Appetite  is reported as good continues to monitor her caloric intake by keeping track via phone poly.  Active with psych services for anxiety/depression which is stable and managed.  HTN - managed and stable with current interventions.  Denies chest pain, palpitations, tachycardia, and shortness of breath, wheezing, no PND/orthopnea, or respiratory complaints for the patient. There is no fever, or chills. No nausea, vomiting, diarrhea, headaches, or vision changes or recent falls. There is no hematuria, dysuria, flank pain, or increased urgency.     Home Visit: Medically necessary due to: dementia/cognitive impairment, unsteady gait/poor balance, shortness of breath with minimal exertion, Illness or condition that results in activity limitation or restriction that impacts the ability to leave home such as: equipment and  /or human assistance needed to safely leave the home, patient has limited support systems to help the patient attend office visits, the office visit would require excessive physical effort or pain for the patient.              Current Outpatient Medications:     amLODIPine (Norvasc) 10 mg tablet, 1 TAB BY MOUTH ONCE A DAY, Disp: 28 tablet, Rfl: 10    docusate sodium (Colace) 100 mg capsule, Take 1 capsule (100 mg) by mouth 2 times a day., Disp: , Rfl:     DULoxetine (Cymbalta) 60 mg DR capsule, 1 CAP BY MOUTH ONCE A DAY, Disp: 28 capsule, Rfl: 10    fluticasone (Flonase) 50 mcg/actuation nasal spray, 1 SPRAY EACH NOSTRIL ONCE A DAY (Patient not taking: Reported on 4/10/2024), Disp: 16 g, Rfl: 10    ketoconazole (NIZOral) 2 % cream, Apply 1 Application topically once daily. (Patient not taking: Reported on 4/10/2024), Disp: 120 g, Rfl: 0    lisinopril 10 mg tablet, 1 TAB BY MOUTH ONCE A DAY, Disp: 28 tablet, Rfl: 10    loratadine (Claritin) 10 mg tablet, 1 TAB BY MOUTH ONCE A DAY, Disp: 28 tablet, Rfl: 10    meloxicam (Mobic) 15 mg tablet, 1 TAB BY MOUTH EVERY MORNING, Disp: 28 tablet, Rfl: 10    multivit-min/iron/FA/vit K/lut (MULTIVITAMIN WOMEN 50 PLUS ORAL), once every 24 hours., Disp: , Rfl:     nystatin (Mycostatin) cream, Apply 1 Application topically 2 times a day. (Patient not taking: Reported on 4/10/2024), Disp: 60 g, Rfl: 1    Ozempic 1 mg/dose (4 mg/3 mL) pen injector, 1 MG SUBQ WEEKLY, Disp: 3 mL, Rfl: 11    pregabalin (Lyrica) 100 mg capsule, 1 CAP BY MOUTH TWICE A DAY, Disp: 56 capsule, Rfl: 2    sertraline (Zoloft) 100 mg tablet, 1 TAB BY MOUTH ONCE A DAY, Disp: 28 tablet, Rfl: 10    traZODone (Desyrel) 100 mg tablet, 1 TAB BY MOUTH DAILY AT BEDTIME, Disp: 28 tablet, Rfl: 10     Review of Systems  CONSTITUTIONAL denies, fever, chills, night sweats, unexplained weight loss. CARDIOLOGY Denies, palpitations, exertional chest pain, shortness of breath. RESPIRATORY Negative for, persistent cough,  "hemoptysis, wheezing. GI Positive for, constipation, diarrhea. UROLOGY Denies voiding symptoms. NEUROLOGY Positive for numbness in her hands and now L foot. MUSCULOSKELETAL Positive for BLE weakness, and pain.     Objective   /68 (BP Location: Left arm, Patient Position: Sitting)   Pulse 68   Temp 35.9 °C (96.7 °F) (Temporal)   Resp 16   Ht 1.651 m (5' 5\")   Wt 135 kg (297 lb) Comment: Actual weight - original weight was incorrect  SpO2 96%   BMI 49.42 kg/m²     Physical Exam  GENERAL APPEARANCE: alert and oriented x 3, Pleasant and cooperative, No Acute Distress.          HEART:  no murmurs regular rate and rhythm.          LUNGS: clear to auscultation bilaterally, no wheezes/rhonchi/rales.          ABDOMEN:  soft, NT/ND, BS present.          EXTREMITIES: no edema.          SKIN: no rash or skin lesions.          NEUROLOGIC EXAM: decreased sensation of the LLE, antalgic gait.          DIABETIC FOOT EXAM:  no ulcers, pedal pulses 1+ bilaterally.          MUSCULOSKELETAL: pain in neck and lower back.          PSYCH: affect normal.          FUNCTIONAL: Ambulatory with cane/walker.          REPORTS REVIEWED:  office notes.     Lab Results   Component Value Date    WBC 7.1 03/16/2024    HGB 14.3 03/16/2024    HCT 42.7 03/16/2024    MCV 96 03/16/2024     03/16/2024      Lab Results   Component Value Date    GLUCOSE 103 (H) 03/16/2024    CALCIUM 9.6 03/16/2024     03/16/2024    K 3.9 03/16/2024    CO2 25 03/16/2024     03/16/2024    BUN 22 03/16/2024    CREATININE 0.90 03/16/2024     Lab Results   Component Value Date    CHOL 189 10/07/2021    CHOL 173 08/08/2019    CHOL 179 07/10/2019     Lab Results   Component Value Date    HDL 74 10/07/2021    HDL 67 08/08/2019    HDL 57 07/10/2019     Lab Results   Component Value Date    LDLCALC 95 10/07/2021    LDLCALC 89 08/08/2019    LDLCALC 100 07/10/2019     Lab Results   Component Value Date    TRIG 98 10/07/2021    TRIG 83 08/08/2019    TRIG " 108 07/10/2019         Assessment/Plan   Diagnoses and all orders for this visit:  HTN (hypertension), benign  Comments:  Managed - Continue amlodipine 10mg daily  Type 2 diabetes mellitus without complication, without long-term current use of insulin (Multi)  Comments:  Managed - continue with ozempic @ 2mg weekly  Osteoarthritis of spine with radiculopathy, lumbar region  Comments:  Active with pain mangement  Morbid obesity with BMI of 45.0-49.9, adult (Multi)  Comments:  Continue with ozempic 2mg weekly, supportive care and lifestyle modifications      More than 50% of time spent in face-to-face discussion @ a total of 40 minutes with this patient on counseling, coordination of care, collaboration of medical records and diagnostics.       Will continue with [facility] home visits. Patient has limited support, limited mobility and unable to navigate to traditional office appointments.  Labs including lipid panel with next home visit    Johanne العلي, APRN-CNP

## 2024-06-11 DIAGNOSIS — B36.9 FUNGAL RASH OF TORSO: ICD-10-CM

## 2024-06-11 RX ORDER — KETOCONAZOLE 20 MG/G
CREAM TOPICAL
Qty: 120 G | Refills: 11 | Status: SHIPPED | OUTPATIENT
Start: 2024-06-11

## 2024-06-19 ENCOUNTER — TELEPHONE (OUTPATIENT)
Dept: PRIMARY CARE | Facility: CLINIC | Age: 69
End: 2024-06-19
Payer: MEDICARE

## 2024-06-19 DIAGNOSIS — E11.69 TYPE 2 DIABETES MELLITUS WITH OTHER SPECIFIED COMPLICATION, WITH LONG-TERM CURRENT USE OF INSULIN (MULTI): Primary | ICD-10-CM

## 2024-06-19 DIAGNOSIS — Z79.4 TYPE 2 DIABETES MELLITUS WITH OTHER SPECIFIED COMPLICATION, WITH LONG-TERM CURRENT USE OF INSULIN (MULTI): Primary | ICD-10-CM

## 2024-07-10 DIAGNOSIS — B36.9 FUNGAL RASH OF TORSO: ICD-10-CM

## 2024-07-10 RX ORDER — NYSTATIN 100000 U/G
CREAM TOPICAL
Qty: 60 G | Refills: 10 | Status: SHIPPED | OUTPATIENT
Start: 2024-07-10

## 2024-07-17 ENCOUNTER — TELEPHONE (OUTPATIENT)
Dept: PRIMARY CARE | Facility: CLINIC | Age: 69
End: 2024-07-17
Payer: MEDICARE

## 2024-07-18 ENCOUNTER — OFFICE VISIT (OUTPATIENT)
Dept: PRIMARY CARE | Facility: CLINIC | Age: 69
End: 2024-07-18
Payer: MEDICARE

## 2024-07-18 VITALS
BODY MASS INDEX: 49.42 KG/M2 | DIASTOLIC BLOOD PRESSURE: 80 MMHG | TEMPERATURE: 97.6 F | RESPIRATION RATE: 16 BRPM | OXYGEN SATURATION: 97 % | HEART RATE: 78 BPM | SYSTOLIC BLOOD PRESSURE: 138 MMHG | WEIGHT: 293 LBS

## 2024-07-18 DIAGNOSIS — E11.69 TYPE 2 DIABETES MELLITUS WITH OTHER SPECIFIED COMPLICATION, WITH LONG-TERM CURRENT USE OF INSULIN (MULTI): Primary | ICD-10-CM

## 2024-07-18 DIAGNOSIS — M15.9 PRIMARY OSTEOARTHRITIS INVOLVING MULTIPLE JOINTS: Chronic | ICD-10-CM

## 2024-07-18 DIAGNOSIS — Z79.4 TYPE 2 DIABETES MELLITUS WITH OTHER SPECIFIED COMPLICATION, WITH LONG-TERM CURRENT USE OF INSULIN (MULTI): Primary | ICD-10-CM

## 2024-07-18 DIAGNOSIS — I10 HTN (HYPERTENSION), BENIGN: Chronic | ICD-10-CM

## 2024-07-18 DIAGNOSIS — M54.17 LUMBOSACRAL RADICULITIS: Chronic | ICD-10-CM

## 2024-07-18 DIAGNOSIS — E66.01 MORBID OBESITY WITH BMI OF 45.0-49.9, ADULT (MULTI): Chronic | ICD-10-CM

## 2024-07-18 PROCEDURE — 3075F SYST BP GE 130 - 139MM HG: CPT | Performed by: NURSE PRACTITIONER

## 2024-07-18 PROCEDURE — 99349 HOME/RES VST EST MOD MDM 40: CPT | Performed by: NURSE PRACTITIONER

## 2024-07-18 PROCEDURE — 1125F AMNT PAIN NOTED PAIN PRSNT: CPT | Performed by: NURSE PRACTITIONER

## 2024-07-18 PROCEDURE — 1160F RVW MEDS BY RX/DR IN RCRD: CPT | Performed by: NURSE PRACTITIONER

## 2024-07-18 PROCEDURE — 4010F ACE/ARB THERAPY RXD/TAKEN: CPT | Performed by: NURSE PRACTITIONER

## 2024-07-18 PROCEDURE — 3079F DIAST BP 80-89 MM HG: CPT | Performed by: NURSE PRACTITIONER

## 2024-07-18 PROCEDURE — 1159F MED LIST DOCD IN RCRD: CPT | Performed by: NURSE PRACTITIONER

## 2024-07-18 ASSESSMENT — PAIN SCALES - GENERAL: PAINLEVEL: 8

## 2024-07-18 NOTE — TELEPHONE ENCOUNTER
MORIS Whiting - Phoned patient in follow up to confirm today's [facility] visit, no answer, LMOM.

## 2024-07-18 NOTE — PROGRESS NOTES
"Subjective   Patient ID: Aubrie Valdivia is a 69 y.o. female who presents for Follow-up (DMII, HTN, Weight loss).    This is a 68 year old female seen today in her private residence. She is awake and alert with appropriate awareness and is ambulatory with her walker. PMH: Depression, anxiety, DMII, BALTAZAR, Severe osteoarthritis, morbid obesity, lumbosacral radiculitis, neuropathy, Cervical radiculopathy.     Today is a follow up for HTN, DMII.  Continues to monitor diet and use of ozempic injection recently increased to 2.4mg weekly, waiting on delivery from pharmacy.  Admits that she as had to buy new clothing as she is losing inches and her pants are big.  Denies constipation, nausea and appears to be tolerating well.   Reports keeping food log and counting calories.  Eating a great deal of fruits, vegetables.  Reports that unfortunately she was lifting and putting groceries away and hurt her back and is in significant discomfort today.  Reports that left leg has been more painful and giving out.  She is having a difficult time getting in and out of the shower, \"I would give anything to have a walk in shower\".  Reports a fall in the shower and also \"jamila\" her back. Appetite  is reported as good continues to monitor her caloric intake by keeping track via phone poly.  Active with psych services for anxiety/depression which is stable and managed.  HTN - managed and stable with current interventions.  Denies chest pain, palpitations, tachycardia, and shortness of breath, wheezing, no PND/orthopnea, or respiratory complaints for the patient. There is no fever, or chills. No nausea, vomiting, diarrhea, headaches, or vision changes or recent falls. There is no hematuria, dysuria, flank pain, or increased urgency.     Home Visit: Medically necessary due to: dementia/cognitive impairment, unsteady gait/poor balance, shortness of breath with minimal exertion, Illness or condition that results in activity limitation or " restriction that impacts the ability to leave home such as: equipment and /or human assistance needed to safely leave the home, patient has limited support systems to help the patient attend office visits, the office visit would require excessive physical effort or pain for the patient.              Current Outpatient Medications:     amLODIPine (Norvasc) 10 mg tablet, 1 TAB BY MOUTH ONCE A DAY, Disp: 28 tablet, Rfl: 10    docusate sodium (Colace) 100 mg capsule, Take 1 capsule (100 mg) by mouth 2 times a day., Disp: , Rfl:     DULoxetine (Cymbalta) 60 mg DR capsule, 1 CAP BY MOUTH ONCE A DAY, Disp: 28 capsule, Rfl: 10    fluticasone (Flonase) 50 mcg/actuation nasal spray, 1 SPRAY EACH NOSTRIL ONCE A DAY (Patient not taking: Reported on 4/10/2024), Disp: 16 g, Rfl: 10    ketoconazole (NIZOral) 2 % cream, APPLY 1 APPLICATION EXTERNALLY ONCE A DAY, Disp: 120 g, Rfl: 11    lisinopril 10 mg tablet, 1 TAB BY MOUTH ONCE A DAY, Disp: 28 tablet, Rfl: 10    loratadine (Claritin) 10 mg tablet, 1 TAB BY MOUTH ONCE A DAY, Disp: 28 tablet, Rfl: 10    meloxicam (Mobic) 15 mg tablet, 1 TAB BY MOUTH EVERY MORNING, Disp: 28 tablet, Rfl: 10    multivit-min/iron/FA/vit K/lut (MULTIVITAMIN WOMEN 50 PLUS ORAL), once every 24 hours., Disp: , Rfl:     nystatin (Mycostatin) cream, APPLY 1 APPLCIATION TOPICALLY TWICE A DAY, Disp: 60 g, Rfl: 10    pregabalin (Lyrica) 100 mg capsule, 1 CAP BY MOUTH TWICE A DAY, Disp: 56 capsule, Rfl: 2    semaglutide, weight loss, 2.4 mg/0.75 mL pen injector, Inject 2.4 mg under the skin 1 (one) time per week., Disp: 8 mL, Rfl: 3    sertraline (Zoloft) 100 mg tablet, 1 TAB BY MOUTH ONCE A DAY, Disp: 28 tablet, Rfl: 10    traZODone (Desyrel) 100 mg tablet, 1 TAB BY MOUTH DAILY AT BEDTIME, Disp: 28 tablet, Rfl: 10     Review of Systems  CONSTITUTIONAL denies, fever, chills, night sweats, unexplained weight loss. CARDIOLOGY Denies, palpitations, exertional chest pain, shortness of breath. RESPIRATORY Negative  for, persistent cough, hemoptysis, wheezing. GI Positive for, constipation, diarrhea. UROLOGY Denies voiding symptoms. NEUROLOGY Positive for numbness in her hands and now L foot. MUSCULOSKELETAL Positive for BLE weakness, and pain.     Objective   /80 (BP Location: Left arm)   Pulse 78   Temp 36.4 °C (97.6 °F) (Temporal)   Resp 16   Wt 135 kg (297 lb)   SpO2 97%   BMI 49.42 kg/m²     Physical Exam  GENERAL APPEARANCE: alert and oriented x 3, Pleasant and cooperative, No Acute Distress.          HEART:  no murmurs regular rate and rhythm.          LUNGS: clear to auscultation bilaterally, no wheezes/rhonchi/rales.          ABDOMEN:  soft, NT/ND, BS present.          EXTREMITIES: no edema.          SKIN: no rash or skin lesions.          NEUROLOGIC EXAM: decreased sensation of the LLE, antalgic gait.          DIABETIC FOOT EXAM:  no ulcers, pedal pulses 1+ bilaterally.          MUSCULOSKELETAL: pain in neck and lower back.          PSYCH: affect normal.          FUNCTIONAL: Ambulatory with cane/walker.          REPORTS REVIEWED:  office notes.    Assessment/Plan   Diagnoses and all orders for this visit:  Type 2 diabetes mellitus with other specified complication, with long-term current use of insulin (Multi)  Comments:  Increase ozempic to 2.5mg/0.5mL or noted as 50 units  Orders:  -     semaglutide, weight loss, 2.4 mg/0.75 mL pen injector; Inject 2.4 mg under the skin 1 (one) time per week.  HTN (hypertension), benign  Comments:  Managed - continue amlodipine 10mg daily  Morbid obesity with BMI of 45.0-49.9, adult (Multi)  Comments:  Increase ozempic to 2.5mg/0.5mL or noted as 50 units  Primary osteoarthritis involving multiple joints  Comments:  Continue with supportive care  Lumbosacral radiculitis  Comments:  Managed - follow up with pain managment    More than 50% of time spent in face-to-face discussion @ a total of 40 minutes with this patient on counseling, coordination of care, review of  medical records and diagnostics.    Back pain - Recommending - follow up with pain management     Working on getting a walk in shower calling [place] [facility] - Script for shower?    Ozempic increase to 2.4mg weekly    Johanne العلي, APRN-CNP

## 2024-08-05 DIAGNOSIS — M54.17 LUMBOSACRAL RADICULITIS: ICD-10-CM

## 2024-08-06 RX ORDER — PREGABALIN 100 MG/1
CAPSULE ORAL
Qty: 56 CAPSULE | Refills: 0 | Status: SHIPPED | OUTPATIENT
Start: 2024-08-06

## 2024-08-06 NOTE — TELEPHONE ENCOUNTER
I will send in a 30 day prescription but she needs to make another follow up with me to get any more refills. Thanks

## 2024-08-06 NOTE — TELEPHONE ENCOUNTER
VM left for the patient advising that a refill of Pregablin was sent in. Advised that the patient does need an appointment for further refills, urged the patient to call the office and schedule and appointment.

## 2024-09-05 DIAGNOSIS — M54.17 LUMBOSACRAL RADICULITIS: Primary | ICD-10-CM

## 2024-09-05 DIAGNOSIS — I10 HTN (HYPERTENSION), BENIGN: ICD-10-CM

## 2024-09-05 RX ORDER — LISINOPRIL 10 MG/1
10 TABLET ORAL DAILY
Qty: 30 TABLET | Refills: 11 | Status: SHIPPED | OUTPATIENT
Start: 2024-09-05

## 2024-09-06 RX ORDER — GABAPENTIN 400 MG/1
CAPSULE ORAL
Qty: 102 CAPSULE | Refills: 10 | Status: SHIPPED | OUTPATIENT
Start: 2024-09-06

## 2024-09-25 ENCOUNTER — TELEPHONE (OUTPATIENT)
Dept: PRIMARY CARE | Facility: CLINIC | Age: 69
End: 2024-09-25
Payer: MEDICARE

## 2024-09-25 NOTE — TELEPHONE ENCOUNTER
LMOM to confirm appt. Appt is marked as cancelled on Epic but done by system. Asked pt to call office back to confirm is she wants appt or not.

## 2024-09-26 ENCOUNTER — APPOINTMENT (OUTPATIENT)
Dept: PRIMARY CARE | Facility: CLINIC | Age: 69
End: 2024-09-26
Payer: MEDICARE

## 2024-09-27 DIAGNOSIS — M54.17 LUMBOSACRAL RADICULITIS: ICD-10-CM

## 2024-09-27 RX ORDER — PREGABALIN 100 MG/1
CAPSULE ORAL
Qty: 56 CAPSULE | Refills: 10 | OUTPATIENT
Start: 2024-09-27

## 2024-10-10 ENCOUNTER — TELEPHONE (OUTPATIENT)
Dept: PRIMARY CARE | Facility: CLINIC | Age: 69
End: 2024-10-10
Payer: MEDICARE

## 2024-10-10 NOTE — TELEPHONE ENCOUNTER
"MORIS Greennie - 10/11/24 [facility] visit confirmed with patient. Patient reported she has an episode where she \"broke out in hives last weekend.\".  Reported 3 hives with itching on her ABD. She did not associate it with eating anything different. No changes in soaps or detergents. She has not had a re-occurrence of hives since then.   " FYI, dosing below not accurate. Dosing per todays visit:

## 2024-10-11 ENCOUNTER — OFFICE VISIT (OUTPATIENT)
Dept: PRIMARY CARE | Facility: CLINIC | Age: 69
End: 2024-10-11
Payer: MEDICARE

## 2024-10-11 VITALS
OXYGEN SATURATION: 97 % | HEIGHT: 65 IN | TEMPERATURE: 96.7 F | DIASTOLIC BLOOD PRESSURE: 70 MMHG | WEIGHT: 283 LBS | RESPIRATION RATE: 16 BRPM | SYSTOLIC BLOOD PRESSURE: 118 MMHG | BODY MASS INDEX: 47.15 KG/M2 | HEART RATE: 84 BPM

## 2024-10-11 DIAGNOSIS — M54.17 LUMBOSACRAL RADICULITIS: ICD-10-CM

## 2024-10-11 DIAGNOSIS — L30.9 DERMATITIS: ICD-10-CM

## 2024-10-11 DIAGNOSIS — E66.01 MORBID OBESITY WITH BMI OF 45.0-49.9, ADULT (MULTI): Chronic | ICD-10-CM

## 2024-10-11 DIAGNOSIS — L30.9 ECZEMA, UNSPECIFIED TYPE: ICD-10-CM

## 2024-10-11 DIAGNOSIS — I10 HTN (HYPERTENSION), BENIGN: Primary | Chronic | ICD-10-CM

## 2024-10-11 RX ORDER — PREGABALIN 100 MG/1
100 CAPSULE ORAL 2 TIMES DAILY
Qty: 180 CAPSULE | Refills: 3 | Status: SHIPPED | OUTPATIENT
Start: 2024-10-11 | End: 2025-10-11

## 2024-10-11 RX ORDER — TRIAMCINOLONE ACETONIDE 1 MG/G
CREAM TOPICAL 2 TIMES DAILY
Qty: 30 G | Refills: 5 | Status: SHIPPED | OUTPATIENT
Start: 2024-10-11 | End: 2024-11-10

## 2024-10-11 RX ORDER — METHYLPREDNISOLONE 4 MG/1
TABLET ORAL
Qty: 21 TABLET | Refills: 0 | Status: SHIPPED | OUTPATIENT
Start: 2024-10-11 | End: 2024-10-18

## 2024-10-11 ASSESSMENT — PAIN SCALES - GENERAL: PAINLEVEL: 5

## 2024-10-11 NOTE — PROGRESS NOTES
Subjective   Patient ID: Aubrie Valdivia is a 69 y.o. female who presents for Follow-up (DMII, HTN, Weight Loss).    This is a 68 year old female seen today in her private residence. She is awake and alert with appropriate awareness and is ambulatory with her walker. PMH: Depression, anxiety, DMII, ABLTAZAR, Severe osteoarthritis, morbid obesity, lumbosacral radiculitis, neuropathy, Cervical radiculopathy.      Today is a follow up for HTN, DMII and new rash to torso.  Rash is raised and covering torso, shoulders, abdomen and top of arms.  Reports that it is itching, slightly better today than 3 days ago.  Denies changes to laundry soap, lotions, foods, or clothing.  Has not tried otc treatments, and reports just trying to let air get to it.  Continues to monitor diet and use of ozempic injection recently increased to 2.4mg weekly, (max dosage)t.  To date she has lost 23 lbs.  Admits that she as had to buy new clothing as she is losing inches and her pants are big.  Denies constipation, nausea and appears to be tolerating well.   Reports keeping food log and counting calories.  Eating a great deal of fruits, vegetables.  Reports that unfortunately she was lifting and putting groceries away and hurt her back and is in significant discomfort today.  Reports that left leg has been more painful and giving out.  Reports that she recently had a walk in shower installed, which has helped a great deal with managing her own personal care, and bathing regularly.  Active with psych services for anxiety/depression which is stable and managed.  HTN - managed and stable with current interventions.  Denies chest pain, palpitations, tachycardia, and shortness of breath, wheezing, no PND/orthopnea, or respiratory complaints for the patient. There is no fever, or chills. No nausea, vomiting, diarrhea, headaches, or vision changes or recent falls. There is no hematuria, dysuria, flank pain, or increased urgency.     Home Visit: Medically  necessary due to: dementia/cognitive impairment, unsteady gait/poor balance, shortness of breath with minimal exertion, Illness or condition that results in activity limitation or restriction that impacts the ability to leave home such as: equipment and /or human assistance needed to safely leave the home, patient has limited support systems to help the patient attend office visits, the office visit would require excessive physical effort or pain for the patient.              Current Outpatient Medications:     amLODIPine (Norvasc) 10 mg tablet, 1 TAB BY MOUTH ONCE A DAY, Disp: 28 tablet, Rfl: 10    docusate sodium (Colace) 100 mg capsule, Take 1 capsule (100 mg) by mouth 2 times a day., Disp: , Rfl:     DULoxetine (Cymbalta) 60 mg DR capsule, 1 CAP BY MOUTH ONCE A DAY, Disp: 28 capsule, Rfl: 10    fluticasone (Flonase) 50 mcg/actuation nasal spray, 1 SPRAY EACH NOSTRIL ONCE A DAY, Disp: 16 g, Rfl: 10    ketoconazole (NIZOral) 2 % cream, APPLY 1 APPLICATION EXTERNALLY ONCE A DAY, Disp: 120 g, Rfl: 11    lisinopril 10 mg tablet, 1 TAB BY MOUTH ONCE A DAY, Disp: 30 tablet, Rfl: 11    loratadine (Claritin) 10 mg tablet, 1 TAB BY MOUTH ONCE A DAY, Disp: 28 tablet, Rfl: 10    meloxicam (Mobic) 15 mg tablet, 1 TAB BY MOUTH EVERY MORNING, Disp: 28 tablet, Rfl: 10    multivit-min/iron/FA/vit K/lut (MULTIVITAMIN WOMEN 50 PLUS ORAL), once every 24 hours., Disp: , Rfl:     nystatin (Mycostatin) cream, APPLY 1 APPLCIATION TOPICALLY TWICE A DAY, Disp: 60 g, Rfl: 10    semaglutide, weight loss, 2.4 mg/0.75 mL pen injector, Inject 2.4 mg under the skin 1 (one) time per week., Disp: 8 mL, Rfl: 3    sertraline (Zoloft) 100 mg tablet, 1 TAB BY MOUTH ONCE A DAY, Disp: 28 tablet, Rfl: 10    traZODone (Desyrel) 100 mg tablet, 1 TAB BY MOUTH DAILY AT BEDTIME, Disp: 28 tablet, Rfl: 10    methylPREDNISolone (Medrol Dospak) 4 mg tablets, Take as directed on package., Disp: 21 tablet, Rfl: 0    pregabalin (Lyrica) 100 mg capsule, Take 1  "capsule (100 mg) by mouth 2 times a day., Disp: 180 capsule, Rfl: 3    triamcinolone (Kenalog) 0.1 % cream, Apply topically 2 times a day. Apply to affected area 1-2 times daily as needed., Disp: 30 g, Rfl: 5     Review of Systems  CONSTITUTIONAL denies, fever, chills, night sweats, unexplained weight loss. CARDIOLOGY Denies, palpitations, exertional chest pain, shortness of breath. RESPIRATORY Negative for, persistent cough, hemoptysis, wheezing. GI Positive for, constipation, diarrhea. UROLOGY Denies voiding symptoms. NEUROLOGY Positive for numbness in her hands and now L foot. MUSCULOSKELETAL Positive for BLE weakness, and pain.     Objective   /70 (BP Location: Right arm, Patient Position: Sitting)   Pulse 84   Temp 35.9 °C (96.7 °F) (Temporal)   Resp 16   Ht 1.651 m (5' 5\")   Wt 128 kg (283 lb)   SpO2 97%   BMI 47.09 kg/m²     Physical Exam  GENERAL APPEARANCE: alert and oriented x 3, Pleasant and cooperative, No Acute Distress.          HEART:  no murmurs regular rate and rhythm.          LUNGS: clear to auscultation bilaterally, no wheezes/rhonchi/rales.          ABDOMEN:  soft, NT/ND, BS present.          EXTREMITIES: no edema.          SKIN: rash/welt in appearance, to torso & back         NEUROLOGIC EXAM: decreased sensation of the LLE, antalgic gait.          DIABETIC FOOT EXAM:  no ulcers, pedal pulses 1+ bilaterally.          MUSCULOSKELETAL: pain in neck and lower back.          PSYCH: affect normal.          FUNCTIONAL: Ambulatory with cane/walker.          REPORTS REVIEWED:  office notes, oarrs    Assessment/Plan   Diagnoses and all orders for this visit:  HTN (hypertension), benign  Comments:  Managed - continue lisinopril 10mg daily  Morbid obesity with BMI of 45.0-49.9, adult (Multi)  Comments:  Continue on weight loss journey - ozempic 2.5mg weekly, monitoring daily K/parish intake and increasing fruits and vegetables.  Lumbosacral radiculitis  -     pregabalin (Lyrica) 100 mg capsule; " Take 1 capsule (100 mg) by mouth 2 times a day.  Dermatitis  Comments:  Start Medrol dose pack - and daily loratadine.  Eczema, unspecified type  Comments:  Start medrol dose pack and loradadine  Orders:  -     methylPREDNISolone (Medrol Dospak) 4 mg tablets; Take as directed on package.  -     triamcinolone (Kenalog) 0.1 % cream; Apply topically 2 times a day. Apply to affected area 1-2 times daily as needed.     Eczema - Start medrol pack and kenalog cream    More than 50% of time spent in face-to-face discussion @ a total of 60 minutes with this patient on counseling, coordination of care, and review of medical records and diagnostics.    Preventative Medicine Counseling: Medication Education: Education: Current medication list reviewed and discussed, understanding, patient expressed understanding, adherence: No barriers to adherence identified.     Will continue with [facility] home visits. Patient has limited support, limited mobility and unable to navigate to traditional office appointments.             Johanne العلي, APRN-CNP

## 2024-11-04 ENCOUNTER — TELEPHONE (OUTPATIENT)
Dept: PRIMARY CARE | Facility: CLINIC | Age: 69
End: 2024-11-04
Payer: MEDICARE

## 2024-11-04 NOTE — TELEPHONE ENCOUNTER
Pt states she started to take medrol dose alfonso d/t her skin. Pt states on the fourth day she noticed hives on her leg that were itchy and are now scabbed.

## 2024-11-14 ENCOUNTER — TELEPHONE (OUTPATIENT)
Dept: PRIMARY CARE | Facility: CLINIC | Age: 69
End: 2024-11-14
Payer: MEDICARE

## 2024-11-14 NOTE — TELEPHONE ENCOUNTER
Pt called to let provider know that she is not having any issues at this time with rash. She denies any bumps.

## 2024-11-25 DIAGNOSIS — M17.5 OTHER SECONDARY OSTEOARTHRITIS OF KNEE, UNSPECIFIED LATERALITY: ICD-10-CM

## 2024-11-25 DIAGNOSIS — F51.02 ADJUSTMENT INSOMNIA: ICD-10-CM

## 2024-11-25 DIAGNOSIS — T78.40XS ALLERGY, SEQUELA: ICD-10-CM

## 2024-11-25 DIAGNOSIS — F32.9 REACTIVE DEPRESSION: ICD-10-CM

## 2024-11-25 RX ORDER — DULOXETIN HYDROCHLORIDE 60 MG/1
60 CAPSULE, DELAYED RELEASE ORAL DAILY
Qty: 28 CAPSULE | Refills: 11 | Status: SHIPPED | OUTPATIENT
Start: 2024-11-25

## 2024-11-25 RX ORDER — TRAZODONE HYDROCHLORIDE 100 MG/1
TABLET ORAL
Qty: 28 TABLET | Refills: 11 | Status: SHIPPED | OUTPATIENT
Start: 2024-11-25

## 2024-11-25 RX ORDER — LORATADINE 10 MG/1
10 TABLET ORAL DAILY
Qty: 28 TABLET | Refills: 11 | Status: SHIPPED | OUTPATIENT
Start: 2024-11-25

## 2024-11-25 RX ORDER — FLUTICASONE PROPIONATE 50 MCG
SPRAY, SUSPENSION (ML) NASAL
Qty: 16 G | Refills: 11 | Status: SHIPPED | OUTPATIENT
Start: 2024-11-25

## 2024-11-25 RX ORDER — MELOXICAM 15 MG/1
15 TABLET ORAL DAILY
Qty: 28 TABLET | Refills: 11 | Status: SHIPPED | OUTPATIENT
Start: 2024-11-25

## 2024-11-25 RX ORDER — SERTRALINE HYDROCHLORIDE 100 MG/1
100 TABLET, FILM COATED ORAL DAILY
Qty: 28 TABLET | Refills: 11 | Status: SHIPPED | OUTPATIENT
Start: 2024-11-25

## 2024-12-18 ENCOUNTER — TELEPHONE (OUTPATIENT)
Dept: PRIMARY CARE | Facility: CLINIC | Age: 69
End: 2024-12-18
Payer: MEDICARE

## 2024-12-18 NOTE — TELEPHONE ENCOUNTER
Received call from [employer] on behalf of patient's insurance plan regarding a CMS  improvement measure. In a recent medication review, there was a question about appropriateness of a statin medication for this patient. They are asking that starting a statin be discussed at her next appointment.   If any questions [employer] can be reached at [phone], reference number is [id number].

## 2025-01-10 ENCOUNTER — APPOINTMENT (OUTPATIENT)
Dept: PRIMARY CARE | Facility: CLINIC | Age: 70
End: 2025-01-10
Payer: MEDICARE

## 2025-01-24 ENCOUNTER — TELEPHONE (OUTPATIENT)
Dept: PRIMARY CARE | Facility: CLINIC | Age: 70
End: 2025-01-24
Payer: MEDICARE

## 2025-01-24 NOTE — TELEPHONE ENCOUNTER
Phoned patient in follow up, 1/27/25 [facility] visit with Johanne العلي NP confirmed via phone with patient.

## 2025-01-27 ENCOUNTER — OFFICE VISIT (OUTPATIENT)
Dept: PRIMARY CARE | Facility: CLINIC | Age: 70
End: 2025-01-27
Payer: MEDICARE

## 2025-01-27 VITALS
HEART RATE: 77 BPM | TEMPERATURE: 97.1 F | OXYGEN SATURATION: 97 % | HEIGHT: 65 IN | SYSTOLIC BLOOD PRESSURE: 122 MMHG | DIASTOLIC BLOOD PRESSURE: 73 MMHG | BODY MASS INDEX: 46.98 KG/M2 | WEIGHT: 282 LBS | RESPIRATION RATE: 16 BRPM

## 2025-01-27 DIAGNOSIS — Z79.4 TYPE 2 DIABETES MELLITUS WITH OTHER SPECIFIED COMPLICATION, WITH LONG-TERM CURRENT USE OF INSULIN: Primary | ICD-10-CM

## 2025-01-27 DIAGNOSIS — Z23 ENCOUNTER FOR IMMUNIZATION: ICD-10-CM

## 2025-01-27 DIAGNOSIS — I10 HTN (HYPERTENSION), BENIGN: Chronic | ICD-10-CM

## 2025-01-27 DIAGNOSIS — M15.0 PRIMARY OSTEOARTHRITIS INVOLVING MULTIPLE JOINTS: Chronic | ICD-10-CM

## 2025-01-27 DIAGNOSIS — E66.01 MORBID OBESITY WITH BMI OF 45.0-49.9, ADULT (MULTI): Chronic | ICD-10-CM

## 2025-01-27 DIAGNOSIS — G47.33 OSA (OBSTRUCTIVE SLEEP APNEA): Chronic | ICD-10-CM

## 2025-01-27 DIAGNOSIS — E11.69 TYPE 2 DIABETES MELLITUS WITH OTHER SPECIFIED COMPLICATION, WITH LONG-TERM CURRENT USE OF INSULIN: Primary | ICD-10-CM

## 2025-01-27 PROCEDURE — 99349 HOME/RES VST EST MOD MDM 40: CPT | Performed by: NURSE PRACTITIONER

## 2025-01-27 PROCEDURE — 3008F BODY MASS INDEX DOCD: CPT | Performed by: NURSE PRACTITIONER

## 2025-01-27 PROCEDURE — 3074F SYST BP LT 130 MM HG: CPT | Performed by: NURSE PRACTITIONER

## 2025-01-27 PROCEDURE — 3078F DIAST BP <80 MM HG: CPT | Performed by: NURSE PRACTITIONER

## 2025-01-27 PROCEDURE — 1160F RVW MEDS BY RX/DR IN RCRD: CPT | Performed by: NURSE PRACTITIONER

## 2025-01-27 PROCEDURE — 1159F MED LIST DOCD IN RCRD: CPT | Performed by: NURSE PRACTITIONER

## 2025-01-27 PROCEDURE — 4010F ACE/ARB THERAPY RXD/TAKEN: CPT | Performed by: NURSE PRACTITIONER

## 2025-01-27 NOTE — PROGRESS NOTES
Subjective   Patient ID: Aubrie Valdivia is a 69 y.o. female who presents for No chief complaint on file..    This is a 68 year old female seen today in her private residence. She is awake and alert with appropriate awareness and is ambulatory with her walker. PMH: Depression, anxiety, DMII, BALTAZAR, Severe osteoarthritis, morbid obesity, lumbosacral radiculitis, neuropathy, Cervical radiculopathy.      Today is a follow up for HTN, DMII, obesity.  Today she reports everything has been the same.  States that she has been working on her weight, lifestyle changes and monitoring her caloric intake.  Reports being extremely discourage as she has only lost 1 lb in 3 months.  Reports that she is monitoring her daily intake and monitors all that she eat, and her weight is not budging.  Appears to visibly have lost weight, however is depressed and discouraged.  Current weight is at 282 lbs.  Continues with chronic back pain, was getting back injections, however felt that the pain of the injection do not outweigh the results.  Reports that she has arthretic pain all throughout her body.  States that she has had a difficult time this winter and is now having a difficult time with her hands.  In agreement to see Rheumatology, may rule out RA.           Current Outpatient Medications:   •  amLODIPine (Norvasc) 10 mg tablet, 1 TAB BY MOUTH ONCE A DAY, Disp: 28 tablet, Rfl: 10  •  docusate sodium (Colace) 100 mg capsule, Take 1 capsule (100 mg) by mouth 2 times a day., Disp: , Rfl:   •  DULoxetine (Cymbalta) 60 mg DR capsule, 1 CAP BY MOUTH ONCE A DAY, Disp: 28 capsule, Rfl: 11  •  fluticasone (Flonase) 50 mcg/actuation nasal spray, 1 SPRAY EACH NOSTRIL ONCE A DAY, Disp: 16 g, Rfl: 11  •  ketoconazole (NIZOral) 2 % cream, APPLY 1 APPLICATION EXTERNALLY ONCE A DAY, Disp: 120 g, Rfl: 11  •  lisinopril 10 mg tablet, 1 TAB BY MOUTH ONCE A DAY, Disp: 30 tablet, Rfl: 11  •  loratadine (Claritin) 10 mg tablet, 1 TAB BY MOUTH ONCE A DAY,  "Disp: 28 tablet, Rfl: 11  •  meloxicam (Mobic) 15 mg tablet, 1 TAB BY MOUTH EVERY MORNING, Disp: 28 tablet, Rfl: 11  •  multivit-min/iron/FA/vit K/lut (MULTIVITAMIN WOMEN 50 PLUS ORAL), once every 24 hours., Disp: , Rfl:   •  nystatin (Mycostatin) cream, APPLY 1 APPLCIATION TOPICALLY TWICE A DAY, Disp: 60 g, Rfl: 10  •  pregabalin (Lyrica) 100 mg capsule, Take 1 capsule (100 mg) by mouth 2 times a day., Disp: 180 capsule, Rfl: 3  •  semaglutide, weight loss, 2.4 mg/0.75 mL pen injector, Inject 2.4 mg under the skin 1 (one) time per week., Disp: 8 mL, Rfl: 3  •  sertraline (Zoloft) 100 mg tablet, 1 TAB BY MOUTH ONCE A DAY, Disp: 28 tablet, Rfl: 11  •  traZODone (Desyrel) 100 mg tablet, 1 TAB BY MOUTH DAILY AT BEDTIME, Disp: 28 tablet, Rfl: 11  •  tirzepatide, weight loss, (Zepbound) 12.5 mg/0.5 mL injection, Inject 12.5 mg under the skin every 7 days., Disp: 4 each, Rfl: 0     Review of Systems    Objective   /73   Pulse 77   Temp 36.2 °C (97.1 °F) (Temporal)   Resp 16   Ht 1.651 m (5' 5\")   Wt 128 kg (282 lb)   SpO2 97%   BMI 46.93 kg/m²     Physical Exam    Assessment/Plan   {Assess/PlanSmartLinks:21713}             Johanne DELA CRUZ Kitchen, APRN-CNP   " "(Cymbalta) DR capsule 60 mg, 60 mg, oral, Daily, Phuc Diaz MD, 60 mg at 02/04/25 0844    fluticasone (Flonase) nasal spray 1 spray, 1 spray, Each Nostril, Daily, Phuc Diaz MD, 1 spray at 02/03/25 0904    guaiFENesin (Mucinex) 12 hr tablet 600 mg, 600 mg, oral, q12h PRN, hPuc Diaz MD    heparin (porcine) injection 7,500 Units, 7,500 Units, subcutaneous, q8h GINO, Phuc Diaz MD, 7,500 Units at 02/04/25 0640    lisinopril tablet 10 mg, 10 mg, oral, Daily, Phuc Diaz MD, 10 mg at 02/04/25 0844    nystatin (Mycostatin) 100,000 unit/gram powder 1 Application, 1 Application, Topical, BID, Delfina Tony APRN-CNP, 1 Application at 02/04/25 0930    ondansetron ODT (Zofran-ODT) disintegrating tablet 4 mg, 4 mg, oral, q8h PRN **OR** ondansetron (Zofran) injection 4 mg, 4 mg, intravenous, q8h PRN, Phuc Diaz MD    polyethylene glycol (Glycolax, Miralax) packet 17 g, 17 g, oral, Daily PRN, Phuc Diaz MD    polyethylene glycol (Glycolax, Miralax) packet 17 g, 17 g, oral, BID, TIA Shields-CNP, 17 g at 02/02/25 0915    pregabalin (Lyrica) capsule 100 mg, 100 mg, oral, BID PRN, Phuc Diaz MD, 100 mg at 02/04/25 1146    sertraline (Zoloft) tablet 100 mg, 100 mg, oral, Daily, Phuc Diaz MD, 100 mg at 02/04/25 0844     Review of Systems  CONSTITUTIONAL denies, fever, chills, night sweats, unexplained weight loss. CARDIOLOGY Denies, palpitations, exertional chest pain, shortness of breath. RESPIRATORY Negative for, persistent cough, hemoptysis, wheezing. GI Positive for, constipation, diarrhea. UROLOGY Denies voiding symptoms. NEUROLOGY Positive for numbness in her hands and now L foot. MUSCULOSKELETAL Positive for BLE weakness, and pain.     Objective   /73   Pulse 77   Temp 36.2 °C (97.1 °F) (Temporal)   Resp 16   Ht 1.651 m (5' 5\")   Wt 128 kg (282 lb)   SpO2 97%   BMI 46.93 kg/m²     Physical Exam  GENERAL APPEARANCE: alert and oriented x 3, Pleasant and " cooperative, No Acute Distress.          HEART:  no murmurs regular rate and rhythm.          LUNGS: clear to auscultation bilaterally, no wheezes/rhonchi/rales.          ABDOMEN:  soft, NT/ND, BS present.          EXTREMITIES: no edema.          SKIN: rash/welt in appearance, to torso & back         NEUROLOGIC EXAM: decreased sensation of the LLE, antalgic gait.          DIABETIC FOOT EXAM:  no ulcers, pedal pulses 1+ bilaterally.          MUSCULOSKELETAL: pain in neck and lower back.          PSYCH: affect normal.          FUNCTIONAL: Ambulatory with cane/walker.          REPORTS REVIEWED:  office notes, oarrs    Assessment/Plan   Diagnoses and all orders for this visit:  Type 2 diabetes mellitus with other specified complication, with long-term current use of insulin  Comments:  Consider changing GLP-1 Ozempic to Zepbound to additional weight loss managment  Orders:  -     tirzepatide, weight loss, (Zepbound) 12.5 mg/0.5 mL injection; Inject 12.5 mg under the skin every 7 days. (Patient not taking: Reported on 2/1/2025)  -     tirzepatide, weight loss, (Zepbound) 12.5 mg/0.5 mL injection; Inject 12.5 mg under the skin every 7 days.  Encounter for immunization  Comments:  Flu shot administered -tolerated well  HTN (hypertension), benign  Comments:  Managed - cont amlodipine 10mg daily  Orders:  -     tirzepatide, weight loss, (Zepbound) 12.5 mg/0.5 mL injection; Inject 12.5 mg under the skin every 7 days.  Morbid obesity with BMI of 45.0-49.9, adult (Multi)  Comments:  Continue with lifestyle modifications - transition to monjaro/zepbound if approved by insurance  Orders:  -     tirzepatide, weight loss, (Zepbound) 12.5 mg/0.5 mL injection; Inject 12.5 mg under the skin every 7 days.  Primary osteoarthritis involving multiple joints  Comments:  Will consider referral to RA  BALTAZAR (obstructive sleep apnea)  Comments:  WIll transition from ozempic to zepbound for additional benefits for weight loss to help reduce  tissue in the airway that obstructs breathing.  Orders:  -     tirzepatide, weight loss, (Zepbound) 12.5 mg/0.5 mL injection; Inject 12.5 mg under the skin every 7 days.      More than 50% of time spent in face-to-face discussion @ a total of 40 minutes with this patient on counseling, coordination of care, and review of medical records and diagnostics. Advised pt to contact [facility] with any acute concerns or medication needs.      Zepbound ordered with DX: BALTAZAR, HTN, Obesity, DMII    Johanne العلي, APRN-CNP

## 2025-01-29 ENCOUNTER — TELEPHONE (OUTPATIENT)
Dept: PRIMARY CARE | Facility: CLINIC | Age: 70
End: 2025-01-29
Payer: MEDICARE

## 2025-01-29 NOTE — TELEPHONE ENCOUNTER
Patient calling to let provider know the new Rx. Is not covered by her insurance.  Please let patient know what the next step is since that medication will not work.

## 2025-01-31 ENCOUNTER — TELEPHONE (OUTPATIENT)
Dept: PRIMARY CARE | Facility: CLINIC | Age: 70
End: 2025-01-31
Payer: MEDICARE

## 2025-01-31 NOTE — TELEPHONE ENCOUNTER
Calls to report she is not happy the medication was denied for prior auth, both written and telephone call, would like to know how she can appeal this decision.  Please call as you are able to discuss.

## 2025-02-01 ENCOUNTER — APPOINTMENT (OUTPATIENT)
Dept: RADIOLOGY | Facility: HOSPITAL | Age: 70
DRG: 690 | End: 2025-02-01
Payer: MEDICARE

## 2025-02-01 ENCOUNTER — APPOINTMENT (OUTPATIENT)
Dept: CARDIOLOGY | Facility: HOSPITAL | Age: 70
DRG: 690 | End: 2025-02-01
Payer: MEDICARE

## 2025-02-01 ENCOUNTER — HOSPITAL ENCOUNTER (INPATIENT)
Facility: HOSPITAL | Age: 70
DRG: 690 | End: 2025-02-01
Admitting: INTERNAL MEDICINE
Payer: MEDICARE

## 2025-02-01 DIAGNOSIS — N39.0 URINARY TRACT INFECTION WITHOUT HEMATURIA, SITE UNSPECIFIED: ICD-10-CM

## 2025-02-01 DIAGNOSIS — R53.1 GENERALIZED WEAKNESS: ICD-10-CM

## 2025-02-01 DIAGNOSIS — N30.01 ACUTE CYSTITIS WITH HEMATURIA: Primary | ICD-10-CM

## 2025-02-01 PROBLEM — R26.2 AMBULATORY DYSFUNCTION: Status: ACTIVE | Noted: 2025-02-01

## 2025-02-01 LAB
ALBUMIN SERPL BCP-MCNC: 3.5 G/DL (ref 3.4–5)
ALP SERPL-CCNC: 87 U/L (ref 33–136)
ALT SERPL W P-5'-P-CCNC: 10 U/L (ref 7–45)
ANION GAP SERPL CALCULATED.3IONS-SCNC: 15 MMOL/L (ref 10–20)
APPEARANCE UR: ABNORMAL
AST SERPL W P-5'-P-CCNC: 18 U/L (ref 9–39)
BACTERIA #/AREA URNS AUTO: ABNORMAL /HPF
BASOPHILS # BLD AUTO: 0.03 X10*3/UL (ref 0–0.1)
BASOPHILS NFR BLD AUTO: 0.5 %
BILIRUB SERPL-MCNC: 0.5 MG/DL (ref 0–1.2)
BILIRUB UR STRIP.AUTO-MCNC: NEGATIVE MG/DL
BNP SERPL-MCNC: 79 PG/ML (ref 0–99)
BUN SERPL-MCNC: 17 MG/DL (ref 6–23)
CALCIUM SERPL-MCNC: 9 MG/DL (ref 8.6–10.3)
CAOX CRY #/AREA UR COMP ASSIST: ABNORMAL /HPF
CARDIAC TROPONIN I PNL SERPL HS: 7 NG/L (ref 0–13)
CARDIAC TROPONIN I PNL SERPL HS: 7 NG/L (ref 0–13)
CHLORIDE SERPL-SCNC: 101 MMOL/L (ref 98–107)
CO2 SERPL-SCNC: 26 MMOL/L (ref 21–32)
COLOR UR: YELLOW
CREAT SERPL-MCNC: 0.97 MG/DL (ref 0.5–1.05)
EGFRCR SERPLBLD CKD-EPI 2021: 64 ML/MIN/1.73M*2
EOSINOPHIL # BLD AUTO: 0.11 X10*3/UL (ref 0–0.7)
EOSINOPHIL NFR BLD AUTO: 1.8 %
ERYTHROCYTE [DISTWIDTH] IN BLOOD BY AUTOMATED COUNT: 16.4 % (ref 11.5–14.5)
FLUAV RNA RESP QL NAA+PROBE: NOT DETECTED
FLUBV RNA RESP QL NAA+PROBE: NOT DETECTED
GLUCOSE SERPL-MCNC: 92 MG/DL (ref 74–99)
GLUCOSE UR STRIP.AUTO-MCNC: NORMAL MG/DL
HCT VFR BLD AUTO: 44.8 % (ref 36–46)
HGB BLD-MCNC: 14.5 G/DL (ref 12–16)
HYALINE CASTS #/AREA URNS AUTO: ABNORMAL /LPF
IMM GRANULOCYTES # BLD AUTO: 0.02 X10*3/UL (ref 0–0.7)
IMM GRANULOCYTES NFR BLD AUTO: 0.3 % (ref 0–0.9)
KETONES UR STRIP.AUTO-MCNC: ABNORMAL MG/DL
LEUKOCYTE ESTERASE UR QL STRIP.AUTO: ABNORMAL
LYMPHOCYTES # BLD AUTO: 0.68 X10*3/UL (ref 1.2–4.8)
LYMPHOCYTES NFR BLD AUTO: 11.4 %
MAGNESIUM SERPL-MCNC: 1.72 MG/DL (ref 1.6–2.4)
MCH RBC QN AUTO: 30.9 PG (ref 26–34)
MCHC RBC AUTO-ENTMCNC: 32.4 G/DL (ref 32–36)
MCV RBC AUTO: 95 FL (ref 80–100)
MONOCYTES # BLD AUTO: 0.57 X10*3/UL (ref 0.1–1)
MONOCYTES NFR BLD AUTO: 9.5 %
MUCOUS THREADS #/AREA URNS AUTO: ABNORMAL /LPF
NEUTROPHILS # BLD AUTO: 4.56 X10*3/UL (ref 1.2–7.7)
NEUTROPHILS NFR BLD AUTO: 76.5 %
NITRITE UR QL STRIP.AUTO: ABNORMAL
NRBC BLD-RTO: 0 /100 WBCS (ref 0–0)
PH UR STRIP.AUTO: 6 [PH]
PLATELET # BLD AUTO: 174 X10*3/UL (ref 150–450)
POTASSIUM SERPL-SCNC: 3.8 MMOL/L (ref 3.5–5.3)
PROT SERPL-MCNC: 7.1 G/DL (ref 6.4–8.2)
PROT UR STRIP.AUTO-MCNC: ABNORMAL MG/DL
RBC # BLD AUTO: 4.7 X10*6/UL (ref 4–5.2)
RBC # UR STRIP.AUTO: NEGATIVE /UL
RBC #/AREA URNS AUTO: >20 /HPF
SARS-COV-2 RNA RESP QL NAA+PROBE: NOT DETECTED
SODIUM SERPL-SCNC: 138 MMOL/L (ref 136–145)
SP GR UR STRIP.AUTO: 1.02
SQUAMOUS #/AREA URNS AUTO: ABNORMAL /HPF
TSH SERPL-ACNC: 2.19 MIU/L (ref 0.44–3.98)
UROBILINOGEN UR STRIP.AUTO-MCNC: NORMAL MG/DL
WBC # BLD AUTO: 6 X10*3/UL (ref 4.4–11.3)
WBC #/AREA URNS AUTO: ABNORMAL /HPF

## 2025-02-01 PROCEDURE — 36415 COLL VENOUS BLD VENIPUNCTURE: CPT

## 2025-02-01 PROCEDURE — 2500000001 HC RX 250 WO HCPCS SELF ADMINISTERED DRUGS (ALT 637 FOR MEDICARE OP): Performed by: NURSE PRACTITIONER

## 2025-02-01 PROCEDURE — 81001 URINALYSIS AUTO W/SCOPE: CPT

## 2025-02-01 PROCEDURE — 97165 OT EVAL LOW COMPLEX 30 MIN: CPT | Mod: GO

## 2025-02-01 PROCEDURE — 96372 THER/PROPH/DIAG INJ SC/IM: CPT | Performed by: INTERNAL MEDICINE

## 2025-02-01 PROCEDURE — G0378 HOSPITAL OBSERVATION PER HR: HCPCS

## 2025-02-01 PROCEDURE — 99285 EMERGENCY DEPT VISIT HI MDM: CPT

## 2025-02-01 PROCEDURE — 71045 X-RAY EXAM CHEST 1 VIEW: CPT

## 2025-02-01 PROCEDURE — 72170 X-RAY EXAM OF PELVIS: CPT | Performed by: RADIOLOGY

## 2025-02-01 PROCEDURE — 2500000004 HC RX 250 GENERAL PHARMACY W/ HCPCS (ALT 636 FOR OP/ED): Performed by: INTERNAL MEDICINE

## 2025-02-01 PROCEDURE — 2500000002 HC RX 250 W HCPCS SELF ADMINISTERED DRUGS (ALT 637 FOR MEDICARE OP, ALT 636 FOR OP/ED): Performed by: INTERNAL MEDICINE

## 2025-02-01 PROCEDURE — 96365 THER/PROPH/DIAG IV INF INIT: CPT | Mod: 59

## 2025-02-01 PROCEDURE — 87636 SARSCOV2 & INF A&B AMP PRB: CPT

## 2025-02-01 PROCEDURE — 96361 HYDRATE IV INFUSION ADD-ON: CPT

## 2025-02-01 PROCEDURE — 2500000001 HC RX 250 WO HCPCS SELF ADMINISTERED DRUGS (ALT 637 FOR MEDICARE OP): Performed by: INTERNAL MEDICINE

## 2025-02-01 PROCEDURE — 93005 ELECTROCARDIOGRAM TRACING: CPT

## 2025-02-01 PROCEDURE — 71045 X-RAY EXAM CHEST 1 VIEW: CPT | Performed by: RADIOLOGY

## 2025-02-01 PROCEDURE — 2500000004 HC RX 250 GENERAL PHARMACY W/ HCPCS (ALT 636 FOR OP/ED): Performed by: NURSE PRACTITIONER

## 2025-02-01 PROCEDURE — 97530 THERAPEUTIC ACTIVITIES: CPT | Mod: GP

## 2025-02-01 PROCEDURE — 84484 ASSAY OF TROPONIN QUANT: CPT

## 2025-02-01 PROCEDURE — 84443 ASSAY THYROID STIM HORMONE: CPT | Performed by: INTERNAL MEDICINE

## 2025-02-01 PROCEDURE — 2500000004 HC RX 250 GENERAL PHARMACY W/ HCPCS (ALT 636 FOR OP/ED)

## 2025-02-01 PROCEDURE — 72170 X-RAY EXAM OF PELVIS: CPT

## 2025-02-01 PROCEDURE — 83735 ASSAY OF MAGNESIUM: CPT

## 2025-02-01 PROCEDURE — 99222 1ST HOSP IP/OBS MODERATE 55: CPT | Performed by: INTERNAL MEDICINE

## 2025-02-01 PROCEDURE — 87086 URINE CULTURE/COLONY COUNT: CPT | Mod: TRILAB

## 2025-02-01 PROCEDURE — 80053 COMPREHEN METABOLIC PANEL: CPT

## 2025-02-01 PROCEDURE — 83880 ASSAY OF NATRIURETIC PEPTIDE: CPT

## 2025-02-01 PROCEDURE — 85025 COMPLETE CBC W/AUTO DIFF WBC: CPT

## 2025-02-01 PROCEDURE — 93010 ELECTROCARDIOGRAM REPORT: CPT | Performed by: INTERNAL MEDICINE

## 2025-02-01 PROCEDURE — 97161 PT EVAL LOW COMPLEX 20 MIN: CPT | Mod: GP

## 2025-02-01 RX ORDER — DULOXETIN HYDROCHLORIDE 60 MG/1
60 CAPSULE, DELAYED RELEASE ORAL DAILY
Status: DISCONTINUED | OUTPATIENT
Start: 2025-02-01 | End: 2025-02-04 | Stop reason: HOSPADM

## 2025-02-01 RX ORDER — SERTRALINE HYDROCHLORIDE 100 MG/1
100 TABLET, FILM COATED ORAL DAILY
Status: DISCONTINUED | OUTPATIENT
Start: 2025-02-01 | End: 2025-02-04 | Stop reason: HOSPADM

## 2025-02-01 RX ORDER — GUAIFENESIN 600 MG/1
600 TABLET, EXTENDED RELEASE ORAL EVERY 12 HOURS PRN
Status: DISCONTINUED | OUTPATIENT
Start: 2025-02-01 | End: 2025-02-04 | Stop reason: HOSPADM

## 2025-02-01 RX ORDER — ACETAMINOPHEN 160 MG/5ML
650 SOLUTION ORAL EVERY 4 HOURS PRN
Status: DISCONTINUED | OUTPATIENT
Start: 2025-02-01 | End: 2025-02-04 | Stop reason: HOSPADM

## 2025-02-01 RX ORDER — GUAIFENESIN/DEXTROMETHORPHAN 100-10MG/5
5 SYRUP ORAL EVERY 4 HOURS PRN
Status: DISCONTINUED | OUTPATIENT
Start: 2025-02-01 | End: 2025-02-04 | Stop reason: HOSPADM

## 2025-02-01 RX ORDER — ACETAMINOPHEN 650 MG/1
650 SUPPOSITORY RECTAL EVERY 4 HOURS PRN
Status: DISCONTINUED | OUTPATIENT
Start: 2025-02-01 | End: 2025-02-04 | Stop reason: HOSPADM

## 2025-02-01 RX ORDER — NYSTATIN 100000 [USP'U]/G
1 POWDER TOPICAL 2 TIMES DAILY
Status: DISCONTINUED | OUTPATIENT
Start: 2025-02-01 | End: 2025-02-04 | Stop reason: HOSPADM

## 2025-02-01 RX ORDER — FLUTICASONE PROPIONATE 50 MCG
1 SPRAY, SUSPENSION (ML) NASAL DAILY
Status: DISCONTINUED | OUTPATIENT
Start: 2025-02-01 | End: 2025-02-04 | Stop reason: HOSPADM

## 2025-02-01 RX ORDER — CEFTRIAXONE 1 G/50ML
1 INJECTION, SOLUTION INTRAVENOUS EVERY 24 HOURS
Status: DISCONTINUED | OUTPATIENT
Start: 2025-02-01 | End: 2025-02-04 | Stop reason: HOSPADM

## 2025-02-01 RX ORDER — PREGABALIN 100 MG/1
100 CAPSULE ORAL 2 TIMES DAILY
Status: DISCONTINUED | OUTPATIENT
Start: 2025-02-01 | End: 2025-02-01

## 2025-02-01 RX ORDER — POLYETHYLENE GLYCOL 3350 17 G/17G
17 POWDER, FOR SOLUTION ORAL 2 TIMES DAILY
Status: DISCONTINUED | OUTPATIENT
Start: 2025-02-01 | End: 2025-02-04 | Stop reason: HOSPADM

## 2025-02-01 RX ORDER — PREGABALIN 100 MG/1
100 CAPSULE ORAL 2 TIMES DAILY PRN
Status: DISCONTINUED | OUTPATIENT
Start: 2025-02-01 | End: 2025-02-04 | Stop reason: HOSPADM

## 2025-02-01 RX ORDER — POLYETHYLENE GLYCOL 3350 17 G/17G
17 POWDER, FOR SOLUTION ORAL DAILY PRN
Status: DISCONTINUED | OUTPATIENT
Start: 2025-02-01 | End: 2025-02-04 | Stop reason: HOSPADM

## 2025-02-01 RX ORDER — ACETAMINOPHEN 325 MG/1
650 TABLET ORAL EVERY 4 HOURS PRN
Status: DISCONTINUED | OUTPATIENT
Start: 2025-02-01 | End: 2025-02-04 | Stop reason: HOSPADM

## 2025-02-01 RX ORDER — LISINOPRIL 10 MG/1
10 TABLET ORAL DAILY
Status: DISCONTINUED | OUTPATIENT
Start: 2025-02-01 | End: 2025-02-04 | Stop reason: HOSPADM

## 2025-02-01 RX ORDER — ONDANSETRON 4 MG/1
4 TABLET, ORALLY DISINTEGRATING ORAL EVERY 8 HOURS PRN
Status: DISCONTINUED | OUTPATIENT
Start: 2025-02-01 | End: 2025-02-04 | Stop reason: HOSPADM

## 2025-02-01 RX ORDER — AMLODIPINE BESYLATE 10 MG/1
10 TABLET ORAL DAILY
Status: DISCONTINUED | OUTPATIENT
Start: 2025-02-01 | End: 2025-02-04 | Stop reason: HOSPADM

## 2025-02-01 RX ORDER — DOCUSATE SODIUM 100 MG/1
100 CAPSULE, LIQUID FILLED ORAL 2 TIMES DAILY
Status: DISCONTINUED | OUTPATIENT
Start: 2025-02-01 | End: 2025-02-04 | Stop reason: HOSPADM

## 2025-02-01 RX ORDER — ONDANSETRON HYDROCHLORIDE 2 MG/ML
4 INJECTION, SOLUTION INTRAVENOUS EVERY 8 HOURS PRN
Status: DISCONTINUED | OUTPATIENT
Start: 2025-02-01 | End: 2025-02-04 | Stop reason: HOSPADM

## 2025-02-01 RX ORDER — CEFTRIAXONE 1 G/50ML
1 INJECTION, SOLUTION INTRAVENOUS ONCE
Status: COMPLETED | OUTPATIENT
Start: 2025-02-01 | End: 2025-02-01

## 2025-02-01 RX ORDER — TRAZODONE HYDROCHLORIDE 100 MG/1
100 TABLET ORAL NIGHTLY PRN
Status: COMPLETED | OUTPATIENT
Start: 2025-02-01 | End: 2025-02-03

## 2025-02-01 RX ORDER — HEPARIN SODIUM 5000 [USP'U]/ML
7500 INJECTION, SOLUTION INTRAVENOUS; SUBCUTANEOUS EVERY 8 HOURS SCHEDULED
Status: DISCONTINUED | OUTPATIENT
Start: 2025-02-01 | End: 2025-02-04 | Stop reason: HOSPADM

## 2025-02-01 RX ADMIN — NYSTATIN 1 APPLICATION: 100000 POWDER TOPICAL at 21:24

## 2025-02-01 RX ADMIN — SERTRALINE HYDROCHLORIDE 100 MG: 100 TABLET ORAL at 09:27

## 2025-02-01 RX ADMIN — NYSTATIN 1 APPLICATION: 100000 POWDER TOPICAL at 15:00

## 2025-02-01 RX ADMIN — TRAZODONE HYDROCHLORIDE 100 MG: 100 TABLET ORAL at 21:24

## 2025-02-01 RX ADMIN — POLYETHYLENE GLYCOL 3350 17 G: 17 POWDER, FOR SOLUTION ORAL at 15:07

## 2025-02-01 RX ADMIN — CEFTRIAXONE SODIUM 1 G: 1 INJECTION, SOLUTION INTRAVENOUS at 04:08

## 2025-02-01 RX ADMIN — CEFTRIAXONE SODIUM 1 G: 1 INJECTION, SOLUTION INTRAVENOUS at 15:07

## 2025-02-01 RX ADMIN — LISINOPRIL 10 MG: 10 TABLET ORAL at 09:27

## 2025-02-01 RX ADMIN — PREGABALIN 100 MG: 100 CAPSULE ORAL at 21:24

## 2025-02-01 RX ADMIN — SODIUM CHLORIDE 500 ML: 900 INJECTION, SOLUTION INTRAVENOUS at 00:37

## 2025-02-01 RX ADMIN — ACETAMINOPHEN 650 MG: 325 TABLET ORAL at 07:01

## 2025-02-01 RX ADMIN — HEPARIN SODIUM 7500 UNITS: 5000 INJECTION, SOLUTION INTRAVENOUS; SUBCUTANEOUS at 06:13

## 2025-02-01 RX ADMIN — DOCUSATE SODIUM 100 MG: 100 CAPSULE, LIQUID FILLED ORAL at 09:26

## 2025-02-01 RX ADMIN — HEPARIN SODIUM 7500 UNITS: 5000 INJECTION, SOLUTION INTRAVENOUS; SUBCUTANEOUS at 15:07

## 2025-02-01 RX ADMIN — FLUTICASONE PROPIONATE 1 SPRAY: 50 SPRAY, METERED NASAL at 09:56

## 2025-02-01 RX ADMIN — HEPARIN SODIUM 7500 UNITS: 5000 INJECTION, SOLUTION INTRAVENOUS; SUBCUTANEOUS at 21:20

## 2025-02-01 RX ADMIN — AMLODIPINE BESYLATE 10 MG: 10 TABLET ORAL at 09:28

## 2025-02-01 RX ADMIN — DULOXETINE HYDROCHLORIDE 60 MG: 60 CAPSULE, DELAYED RELEASE ORAL at 09:27

## 2025-02-01 SDOH — SOCIAL STABILITY: SOCIAL INSECURITY: HAVE YOU HAD THOUGHTS OF HARMING ANYONE ELSE?: NO

## 2025-02-01 SDOH — SOCIAL STABILITY: SOCIAL INSECURITY
WITHIN THE LAST YEAR, HAVE YOU BEEN KICKED, HIT, SLAPPED, OR OTHERWISE PHYSICALLY HURT BY YOUR PARTNER OR EX-PARTNER?: NO

## 2025-02-01 SDOH — SOCIAL STABILITY: SOCIAL INSECURITY: WITHIN THE LAST YEAR, HAVE YOU BEEN HUMILIATED OR EMOTIONALLY ABUSED IN OTHER WAYS BY YOUR PARTNER OR EX-PARTNER?: NO

## 2025-02-01 SDOH — SOCIAL STABILITY: SOCIAL INSECURITY: DOES ANYONE TRY TO KEEP YOU FROM HAVING/CONTACTING OTHER FRIENDS OR DOING THINGS OUTSIDE YOUR HOME?: NO

## 2025-02-01 SDOH — SOCIAL STABILITY: SOCIAL INSECURITY
WITHIN THE LAST YEAR, HAVE YOU BEEN RAPED OR FORCED TO HAVE ANY KIND OF SEXUAL ACTIVITY BY YOUR PARTNER OR EX-PARTNER?: NO

## 2025-02-01 SDOH — SOCIAL STABILITY: SOCIAL INSECURITY: WITHIN THE LAST YEAR, HAVE YOU BEEN AFRAID OF YOUR PARTNER OR EX-PARTNER?: NO

## 2025-02-01 SDOH — SOCIAL STABILITY: SOCIAL INSECURITY: HAVE YOU HAD ANY THOUGHTS OF HARMING ANYONE ELSE?: NO

## 2025-02-01 SDOH — ECONOMIC STABILITY: FOOD INSECURITY: WITHIN THE PAST 12 MONTHS, YOU WORRIED THAT YOUR FOOD WOULD RUN OUT BEFORE YOU GOT THE MONEY TO BUY MORE.: NEVER TRUE

## 2025-02-01 SDOH — SOCIAL STABILITY: SOCIAL INSECURITY: WERE YOU ABLE TO COMPLETE ALL THE BEHAVIORAL HEALTH SCREENINGS?: YES

## 2025-02-01 SDOH — SOCIAL STABILITY: SOCIAL INSECURITY: ARE THERE ANY APPARENT SIGNS OF INJURIES/BEHAVIORS THAT COULD BE RELATED TO ABUSE/NEGLECT?: NO

## 2025-02-01 SDOH — ECONOMIC STABILITY: INCOME INSECURITY: IN THE PAST 12 MONTHS HAS THE ELECTRIC, GAS, OIL, OR WATER COMPANY THREATENED TO SHUT OFF SERVICES IN YOUR HOME?: NO

## 2025-02-01 SDOH — ECONOMIC STABILITY: FOOD INSECURITY: WITHIN THE PAST 12 MONTHS, THE FOOD YOU BOUGHT JUST DIDN'T LAST AND YOU DIDN'T HAVE MONEY TO GET MORE.: NEVER TRUE

## 2025-02-01 SDOH — SOCIAL STABILITY: SOCIAL INSECURITY: DO YOU FEEL ANYONE HAS EXPLOITED OR TAKEN ADVANTAGE OF YOU FINANCIALLY OR OF YOUR PERSONAL PROPERTY?: NO

## 2025-02-01 SDOH — SOCIAL STABILITY: SOCIAL INSECURITY: ARE YOU OR HAVE YOU BEEN THREATENED OR ABUSED PHYSICALLY, EMOTIONALLY, OR SEXUALLY BY ANYONE?: NO

## 2025-02-01 SDOH — SOCIAL STABILITY: SOCIAL INSECURITY: HAS ANYONE EVER THREATENED TO HURT YOUR FAMILY OR YOUR PETS?: NO

## 2025-02-01 SDOH — SOCIAL STABILITY: SOCIAL INSECURITY: ABUSE: ADULT

## 2025-02-01 SDOH — SOCIAL STABILITY: SOCIAL INSECURITY: DO YOU FEEL UNSAFE GOING BACK TO THE PLACE WHERE YOU ARE LIVING?: NO

## 2025-02-01 ASSESSMENT — COGNITIVE AND FUNCTIONAL STATUS - GENERAL
TURNING FROM BACK TO SIDE WHILE IN FLAT BAD: A LITTLE
DAILY ACTIVITIY SCORE: 16
PERSONAL GROOMING: A LITTLE
TOILETING: A LITTLE
MOBILITY SCORE: 15
STANDING UP FROM CHAIR USING ARMS: A LOT
MOVING FROM LYING ON BACK TO SITTING ON SIDE OF FLAT BED WITH BEDRAILS: A LITTLE
WALKING IN HOSPITAL ROOM: A LOT
HELP NEEDED FOR BATHING: A LOT
HELP NEEDED FOR BATHING: A LITTLE
MOVING TO AND FROM BED TO CHAIR: A LOT
DRESSING REGULAR LOWER BODY CLOTHING: A LITTLE
TURNING FROM BACK TO SIDE WHILE IN FLAT BAD: A LITTLE
MOVING TO AND FROM BED TO CHAIR: A LOT
WALKING IN HOSPITAL ROOM: A LOT
MOBILITY SCORE: 16
CLIMB 3 TO 5 STEPS WITH RAILING: TOTAL
DRESSING REGULAR UPPER BODY CLOTHING: A LITTLE
TURNING FROM BACK TO SIDE WHILE IN FLAT BAD: A LITTLE
STANDING UP FROM CHAIR USING ARMS: A LITTLE
STANDING UP FROM CHAIR USING ARMS: A LOT
DAILY ACTIVITIY SCORE: 20
WALKING IN HOSPITAL ROOM: A LOT
DRESSING REGULAR LOWER BODY CLOTHING: A LOT
TOILETING: A LOT
CLIMB 3 TO 5 STEPS WITH RAILING: A LOT
DRESSING REGULAR UPPER BODY CLOTHING: A LITTLE
CLIMB 3 TO 5 STEPS WITH RAILING: A LOT
DAILY ACTIVITIY SCORE: 23
PATIENT BASELINE BEDBOUND: NO
MOVING TO AND FROM BED TO CHAIR: A LOT
DRESSING REGULAR LOWER BODY CLOTHING: A LITTLE
MOBILITY SCORE: 13

## 2025-02-01 ASSESSMENT — ACTIVITIES OF DAILY LIVING (ADL)
HEARING - RIGHT EAR: FUNCTIONAL
BATHING_ASSISTANCE: MODERATE
ADEQUATE_TO_COMPLETE_ADL: YES
GROOMING: INDEPENDENT
PATIENT'S MEMORY ADEQUATE TO SAFELY COMPLETE DAILY ACTIVITIES?: YES
FEEDING YOURSELF: INDEPENDENT
HEARING - LEFT EAR: FUNCTIONAL
ASSISTIVE_DEVICE: WALKER;OTHER (COMMENT)
JUDGMENT_ADEQUATE_SAFELY_COMPLETE_DAILY_ACTIVITIES: YES
ADL_ASSISTANCE: INDEPENDENT
LACK_OF_TRANSPORTATION: NO
DRESSING YOURSELF: INDEPENDENT
BATHING: INDEPENDENT
TOILETING: INDEPENDENT
WALKS IN HOME: INDEPENDENT
ADL_ASSISTANCE: INDEPENDENT

## 2025-02-01 ASSESSMENT — PAIN - FUNCTIONAL ASSESSMENT
PAIN_FUNCTIONAL_ASSESSMENT: 0-10

## 2025-02-01 ASSESSMENT — LIFESTYLE VARIABLES
AUDIT-C TOTAL SCORE: 0
SKIP TO QUESTIONS 9-10: 1
AUDIT-C TOTAL SCORE: 0
HOW OFTEN DO YOU HAVE 6 OR MORE DRINKS ON ONE OCCASION: NEVER
HOW MANY STANDARD DRINKS CONTAINING ALCOHOL DO YOU HAVE ON A TYPICAL DAY: PATIENT DOES NOT DRINK
SUBSTANCE_ABUSE_PAST_12_MONTHS: NO
HOW OFTEN DO YOU HAVE A DRINK CONTAINING ALCOHOL: NEVER
PRESCIPTION_ABUSE_PAST_12_MONTHS: NO

## 2025-02-01 ASSESSMENT — PAIN SCALES - GENERAL
PAINLEVEL_OUTOF10: 0 - NO PAIN
PAINLEVEL_OUTOF10: 3
PAINLEVEL_OUTOF10: 0 - NO PAIN
PAINLEVEL_OUTOF10: 9

## 2025-02-01 ASSESSMENT — PATIENT HEALTH QUESTIONNAIRE - PHQ9
SUM OF ALL RESPONSES TO PHQ9 QUESTIONS 1 & 2: 0
1. LITTLE INTEREST OR PLEASURE IN DOING THINGS: NOT AT ALL
2. FEELING DOWN, DEPRESSED OR HOPELESS: NOT AT ALL

## 2025-02-01 ASSESSMENT — COLUMBIA-SUICIDE SEVERITY RATING SCALE - C-SSRS
1. IN THE PAST MONTH, HAVE YOU WISHED YOU WERE DEAD OR WISHED YOU COULD GO TO SLEEP AND NOT WAKE UP?: NO
6. HAVE YOU EVER DONE ANYTHING, STARTED TO DO ANYTHING, OR PREPARED TO DO ANYTHING TO END YOUR LIFE?: NO
2. HAVE YOU ACTUALLY HAD ANY THOUGHTS OF KILLING YOURSELF?: NO

## 2025-02-01 ASSESSMENT — PAIN DESCRIPTION - LOCATION: LOCATION: BACK

## 2025-02-01 ASSESSMENT — PAIN DESCRIPTION - ORIENTATION: ORIENTATION: LOWER

## 2025-02-01 NOTE — PROGRESS NOTES
Physical Therapy    Physical Therapy Evaluation & Treatment    Patient Name: Aubrie Valdivia  MRN: [id number]  Department: [address]  Room: [address] [place], [place] [address]  Today's Date: 2/1/2025   Time Calculation  Start Time: 1539  Stop Time: 1608  Time Calculation (min): 29 min    Assessment/Plan   PT Assessment  PT Assessment Results: Decreased strength, Decreased range of motion, Decreased endurance, Impaired balance, Decreased mobility, Decreased coordination, Decreased safety awareness, Obesity, Pain  Rehab Prognosis: Good  Barriers to Discharge Home: Caregiver assistance, Physical needs  Caregiver Assistance: Patient lives alone and/or does not have reliable caregiver assistance  Physical Needs: Stair navigation into home limited by function/safety, 24hr mobility assistance needed, High falls risk due to function or environment, Ambulating household distances limited by function/safety  Evaluation/Treatment Tolerance: Patient limited by fatigue  Medical Staff Made Aware: Yes  Strengths: Premorbid level of function  Barriers to Participation: Comorbidities  End of Session Communication: Bedside nurse  Assessment Comment: Pt demonstrates BLE weakness and instability, decreased functional  balance, high fall risk. Pt was fairly IND prior to the past few days. Pt would benefit from continued PT services to progress safe functional mobility.  End of Session Patient Position: Bed, 3 rail up, Alarm on   IP OR SWING BED PT PLAN  Inpatient or Swing Bed: Inpatient  PT Plan  Treatment/Interventions: Bed mobility, Transfer training, Gait training, Balance training, Strengthening, Endurance training, Range of motion, Therapeutic exercise, Therapeutic activity  PT Plan: Ongoing PT  PT Frequency: 4 times per week  PT Discharge Recommendations: Moderate intensity level of continued care  Equipment Recommended upon Discharge: Wheeled walker, Other (comment) (gait belt)  PT Recommended Transfer Status: Assist x2, Assistive device  PT - OK to  "Discharge: Yes      Subjective     General Visit Information:  General  Reason for Referral: impaired mobility, UTI  Referred By: Dr. Diaz  Past Medical History Relevant to Rehab: HTN, ovarian cancer, OA, DM, sciatica, bariatric sx, tobacco abuse, ETOH use, MO, BALTAZAR, anxiety  Family/Caregiver Present:  (friend, a nurse, in at end of session)  Prior to Session Communication: Bedside nurse  Patient Position Received: Bed, 3 rail up, Alarm on  Preferred Learning Style: verbal, visual  General Comment: Pt is a 69 y.o. female and following 2 falls in the 2 days PTA. Pt found to have UTI, reports feeling unsteady, weak, shaky. Pt agreeable to PT eval.  Home Living:  Home Living  Type of Home: Condo  Lives With: Alone  Home Adaptive Equipment: Walker rolling or standard, Cane, Reacher, Sock aid (rollator, 2WW)  Home Layout: One level  Home Access: Stairs to enter with rails  Entrance Stairs-Rails: Left (one grab bar)  Bathroom Shower/Tub: Tub/shower unit (cut out side)  Bathroom Toilet: Handicapped height  Bathroom Equipment: Grab bars in shower, Shower chair without back  Prior Level of Function:  Prior Function Per Pt/Caregiver Report  Level of [place]: Independent with ADLs and functional transfers, Independent with homemaking with ambulation, Other (Comment) (has assist for driving, groceries delivered)  Receives Help From: Friends  ADL Assistance: Independent  Homemaking Assistance: Independent  Ambulatory Assistance:  (rollator)  Prior Function Comments: Pt denies other falls in past 6 months, sleeps in a waterbed frame (high) w/ alexei size matress in it.  Precautions:  Precautions  Hearing/Visual Limitations: glasses  Medical Precautions: Fall precautions, Oxygen therapy device and L/min (2L O2)     Objective   Pain:  Pain Assessment  Pain Assessment: 0-10  0-10 (Numeric) Pain Score: 0 - No pain (reports occcasional pain at \"tailbone\" when on her feet)  Cognition:  Cognition  Overall Cognitive Status: Within " Functional Limits  Cognition Comments: pleasant and cooperative    General Assessments:  Activity Tolerance  Endurance: Decreased tolerance for upright activites  Activity Tolerance Comments: Poor+ (weak, shaky)    Sensation  Sensation Comment: patient reports numbness/tingling in bilateral hands    Strength  Strength Comments: practiced and encouraged pt to perform ankle pumps, quad sets and glute sets on own to promote circulation, strength and weightshifting while in bed. (pt indicated understanding; weak quad sets noted.)  Coordination  Coordination Comment: slow, weak, shaky        Dynamic Standing Balance  Dynamic Standing-Balance Support: Bilateral upper extremity supported (2WW)  Dynamic Standing-Level of Assistance: Minimum assistance (+2)  Dynamic Standing-Balance:  (sidesteps Lt at EOB)  Dynamic Standing-Comments: Fair-/Poor+ (LEs shaky, decreased knee stability)  Functional Assessments:  Bed Mobility 1  Bed Mobility 1: Supine to sitting  Level of Assistance 1: Minimum assistance  Bed Mobility Comments 1: to Lt EOB, HOB elevated slightly, required increased effort, pt w/ slight posterior LOB w/ initial attempt, reports feeling a little woozy; assist to steady.  Bed Mobility 2  Bed Mobility  2: Sitting to supine  Level of Assistance 2: Moderate assistance, +2  Bed Mobility Comments 2: assist for trunk down and LEs into bed as pt w/ difficulty getting LEs onto mattress    Transfer 1  Technique 1: Sit to stand, Stand to sit  Transfer Device 1: Walker ([facility] [place])  Transfer Level of Assistance 1: Minimum assistance, +2, Moderate assistance  Trials/Comments 1: from/to EOB, cues for safe hand placement    Ambulation/Gait Training  Ambulation/Gait Training Performed: Yes  Ambulation/Gait Training 1  Surface 1: Level tile  Device 1: Bariatric rolling walker  Assistance 1: Minimum assistance, Moderate assistance (+2  for safety)  Comments/Distance (ft) 1: Pt able to take a few sidesteps Lt to HOB, then one forward and  back; BLEs getting wobbly, assist for balance.  Extremity/Trunk Assessments:  RLE   RLE : Exceptions to WFL  Strength RLE  R Hip Flexion: 3-/5  R Hip Extension: 3/5  R Hip ABduction: 3-/5  R Hip ADduction: 3+/5  R Knee Flexion: 4-/5  R Knee Extension: 2/5  R Ankle Dorsiflexion: 4/5  R Ankle Plantar Flexion: 4/5  LLE   LLE : Exceptions to WFL  Strength LLE  L Hip Flexion: 3/5  L Hip Extension: 3/5  L Hip ABduction: 3+/5  L Hip ADduction: 3+/5  L Knee Flexion: 4-/5  L Knee Extension: 3-/5  L Ankle Dorsiflexion: 4/5  L Ankle Plantar Flexion: 4/5  Treatments:  Bed Mobility 1  Bed Mobility 1: Supine to sitting  Level of Assistance 1: Minimum assistance  Bed Mobility Comments 1: to Lt EOB, HOB elevated slightly, required increased effort, pt w/ slight posterior LOB w/ initial attempt, reports feeling a little woozy; assist to steady.  Bed Mobility 2  Bed Mobility  2: Sitting to supine  Level of Assistance 2: Moderate assistance, +2  Bed Mobility Comments 2: assist for trunk down and LEs into bed as pt w/ difficulty getting LEs onto mattress    Ambulation/Gait Training  Ambulation/Gait Training Performed: Yes  Ambulation/Gait Training 1  Surface 1: Level tile  Device 1: Bariatric rolling walker  Assistance 1: Minimum assistance, Moderate assistance (+2  for safety)  Comments/Distance (ft) 1: Pt able to take a few sidesteps Lt to HOB, then one forward and back; BLEs getting wobbly, assist for balance.  Transfer 1  Technique 1: Sit to stand, Stand to sit  Transfer Device 1: Walker (JHDR)  Transfer Level of Assistance 1: Minimum assistance, +2, Moderate assistance  Trials/Comments 1: from/to EOB, cues for safe hand placement  Outcome Measures:  [facility] Basic Mobility  Turning from your back to your side while in a flat bed without using bedrails: None  Moving from lying on your back to sitting on the side of a flat bed without using bedrails: A little  Moving to and from bed to chair (including a wheelchair): A lot  Standing  up from a chair using your arms (e.g. wheelchair or bedside chair): A little  To walk in hospital room: A lot  Climbing 3-5 steps with railing: A lot  Basic Mobility - Total Score: 16    Encounter Problems       Encounter Problems (Active)       Mobility       STG - Patient will ambulate 45' w/ min assist and RW, w/o LOB (Progressing)       Start:  02/01/25    Expected End:  02/07/25            STG - Patient will ascend and descend four to six stairs w/ one rail ,cane and min assist (Progressing)       Start:  02/01/25    Expected End:  02/07/25            t will tolerate BLE exercises at 15 reps or > consistently to promote functional strength, endurance, balance and safe mobility  (Progressing)       Start:  02/01/25    Expected End:  02/07/25               PT Transfers       STG - Patient to transfer to and from sit to supine w/ distant supervision and no LOB (Progressing)       Start:  02/01/25    Expected End:  02/07/25            STG - Patient will transfer sit to and from stand w/ CGA, proper technique and no LOB (Progressing)       Start:  02/01/25    Expected End:  02/07/25               Safety       LTG - Patient will utilize safety techniques w/ functional mobility (Progressing)       Start:  02/01/25    Expected End:  02/07/25                   Education Documentation  Precautions, taught by Masha Clay, PT at 2/1/2025  5:54 PM.  Learner: Patient  Readiness: Acceptance  Method: Demonstration  Response: Needs Reinforcement  Comment: safety and technique    Home Exercise Program, taught by Masha Clay, PT at 2/1/2025  5:54 PM.  Learner: Patient  Readiness: Acceptance  Method: Demonstration  Response: Needs Reinforcement  Comment: safety and technique    Mobility Training, taught by Masha Clay, PT at 2/1/2025  5:54 PM.  Learner: Patient  Readiness: Acceptance  Method: Demonstration  Response: Needs Reinforcement  Comment: safety and technique    Education Comments  No comments found.

## 2025-02-01 NOTE — ED PROVIDER NOTES
HPI   No chief complaint on file.      Patient is a 69-year-old female who presents today with a chief complaint of fall/generalized weakness.  Patient she has been feeling more weak, she states she went to stand up, and slowly sit down, feels weak and shaky patient has a fevers or chills, chest pain or shortness of breath, [place] any nausea, vomiting or abdominal pain, patient has no other acute complaints at this time.      History provided by:  Patient          Patient History   Past Medical History:   Diagnosis Date    Back pain     Hiatal hernia     Hypertension     Lumbosacral radiculitis     Malignant neoplasm of unspecified ovary (Multi)     Malignant neoplasm of ovary    Osteoarthritis     Ovarian cancer (Multi)     Prediabetes     SBO (small bowel obstruction) (Multi)     Sciatica     Umbilical hernia      Past Surgical History:   Procedure Laterality Date    APPENDECTOMY      Appendectomy    BARIATRIC SURGERY  2021    gastric sleeve- [facility]    CHOLECYSTECTOMY  2012    Cholecystectomy    HYSTERECTOMY      with bilateral oophrectomy    OTHER SURGICAL HISTORY      Resection Of Ovarian Malig. + BSO, Omentectomy, Debulking    OTHER SURGICAL HISTORY      Obstructive bowel    OTHER SURGICAL HISTORY  2020    endoscopic SBO repair -      Family History   Problem Relation Name Age of Onset    Alzheimer's disease Mother Bebe     Arthritis Mother Bebe     Cancer Mother Bebe     Heart disease Father      Heart disease Sister      Drug abuse Brother Kalen     No Known Problems Daughter       Social History     Tobacco Use    Smoking status: Former     Current packs/day: 0.00     Average packs/day: 0.5 packs/day for 16.0 years (8.0 ttl pk-yrs)     Types: Cigarettes     Start date:      Quit date:      Years since quittin.1    Smokeless tobacco: Never   Vaping Use    Vaping status: Never Used   Substance Use Topics    Alcohol use: Yes     Alcohol/week: 7.0 standard  drinks of alcohol     Types: 7 Shots of liquor per week     Comment: vodka and lime nightly    Drug use: Not Currently       Physical Exam   ED Triage Vitals   Temp Pulse Resp BP   -- -- -- --      SpO2 Temp src Heart Rate Source Patient Position   -- -- -- --      BP Location FiO2 (%)     -- --       Physical Exam  Vitals and nursing note reviewed. Exam conducted with a chaperone present.   Constitutional:       General: She is not in acute distress.     Appearance: Normal appearance. She is normal weight. She is not ill-appearing, toxic-appearing or diaphoretic.   HENT:      Head: Normocephalic and atraumatic.      Nose: Nose normal.      Mouth/Throat:      Mouth: Mucous membranes are moist.      Pharynx: Oropharynx is clear.   Eyes:      Extraocular Movements: Extraocular movements intact.      Conjunctiva/sclera: Conjunctivae normal.      Pupils: Pupils are equal, round, and reactive to light.   Cardiovascular:      Rate and Rhythm: Normal rate and regular rhythm.      Pulses: Normal pulses.      Heart sounds: Normal heart sounds.   Pulmonary:      Effort: Pulmonary effort is normal.      Breath sounds: Normal breath sounds.   Abdominal:      General: Bowel sounds are normal.      Palpations: Abdomen is soft.   Musculoskeletal:         General: Normal range of motion.   Skin:     General: Skin is warm and dry.      Capillary Refill: Capillary refill takes less than 2 seconds.   Neurological:      General: No focal deficit present.      Mental Status: She is alert and oriented to person, place, and time. Mental status is at baseline.      Cranial Nerves: No cranial nerve deficit.   Psychiatric:         Mood and Affect: Mood normal.         Behavior: Behavior normal.         Thought Content: Thought content normal.         Judgment: Judgment normal.           ED Course & MDM   ED Course as of 02/01/25 0527   Sat Feb 01, 2025   0031 EKG interpreted by myself independently, EKG shows a normal sinus rhythm, rate of  76 bpm, RI interval 188, QRS 82, , QTc 425, patient has no ST elevation or depressions, negative for acute MI. [DEEPALI]      ED Course User Index  [DEEPALI] Henri Pepe,          Diagnoses as of 02/01/25 0527   Acute cystitis with hematuria   Generalized weakness                 No data recorded     [place] Coma Scale Score: 15 (02/01/25 0014 : Pauly Nash RN)                           Medical Decision Making  Patient seen and evaluated at bedside, patient is in no acute distress.  I will order a CBC, CMP, urinalysis, COVID, flu, troponin, BNP, CMP, magnesium, x-ray chest, x-ray pelvis, EKG, give the patient 500 cc normal saline. Differential diagnosis includes but is not limited to generalized weakness, electrolyte abnormality, viral upper respiratory illness, pneumonia, UTI,  Patient's lab work does show signs of urinary tract infection, patient was given 1 g IV Rocephin, patient has a negative COVID and flu test, troponins flat at 7, BNP 79, no electrolyte abnormality, no leukocytosis, do not appreciate any acute findings on the x-ray pelvis or chest, formal reports this below.  Due to patient's generalized weakness and UTI, I will admit the patient to the general medicine team for further evaluation and treatment.    Diagnosis: UTI, generalized weakness  XR pelvis 1-2 views   Final Result    No acute fracture.          Postsurgical and degenerative changes as noted above.          MACRO:    None          Signed by: Oc Cai 2/1/2025 1:00 AM    Dictation workstation:   [id number]     XR chest 1 view   Final Result    No acute cardiopulmonary process.          MACRO:    None          Signed by: Oc Cai 2/1/2025 12:58 AM    Dictation workstation:   [id number]     Results for orders placed or performed during the hospital encounter of 02/01/25  -CBC and Auto Differential:   Collection Time: 02/01/25 12:32 AM       Result                      Value             Ref Range           WBC                          6.0               4.4 - 11.3 x*       nRBC                        0.0               0.0 - 0.0 /1*       RBC                         4.70              4.00 - 5.20 *       Hemoglobin                  14.5              12.0 - 16.0 *       Hematocrit                  44.8              36.0 - 46.0 %       MCV                         95                80 - 100 fL         MCH                         30.9              26.0 - 34.0 *       MCHC                        32.4              32.0 - 36.0 *       RDW                         16.4 (H)          11.5 - 14.5 %       Platelets                   174               150 - 450 x1*       Neutrophils %               76.5              40.0 - 80.0 %       Immature Granulocytes *     0.3               0.0 - 0.9 %         Lymphocytes %               11.4              13.0 - 44.0 %       Monocytes %                 9.5               2.0 - 10.0 %        Eosinophils %               1.8               0.0 - 6.0 %         Basophils %                 0.5               0.0 - 2.0 %         Neutrophils Absolute        4.56              1.20 - 7.70 *       Immature Granulocytes *     0.02              0.00 - 0.70 *       Lymphocytes Absolute        0.68 (L)          1.20 - 4.80 *       Monocytes Absolute          0.57              0.10 - 1.00 *       Eosinophils Absolute        0.11              0.00 - 0.70 *       Basophils Absolute          0.03              0.00 - 0.10 *  -Magnesium:   Collection Time: 02/01/25 12:32 AM       Result                      Value             Ref Range           Magnesium                   1.72              1.60 - 2.40 *  -Comprehensive metabolic panel:   Collection Time: 02/01/25 12:32 AM       Result                      Value             Ref Range           Glucose                     92                74 - 99 mg/dL       Sodium                      138               136 - 145 mm*       Potassium                   3.8               3.5 - 5.3 mm*        Chloride                    101               98 - 107 mmo*       Bicarbonate                 26                21 - 32 mmol*       Anion Gap                   15                10 - 20 mmol*       Urea Nitrogen               17                6 - 23 mg/dL        Creatinine                  0.97              0.50 - 1.05 *       eGFR                        64                >60 mL/min/1*       Calcium                     9.0               8.6 - 10.3 m*       Albumin                     3.5               3.4 - 5.0 g/*       Alkaline Phosphatase        87                33 - 136 U/L        Total Protein               7.1               6.4 - 8.2 g/*       AST                         18                9 - 39 U/L          Bilirubin, Total            0.5               0.0 - 1.2 mg*       ALT                         10                7 - 45 U/L     -B-Type Natriuretic Peptide:   Collection Time: 02/01/25 12:32 AM       Result                      Value             Ref Range           BNP                         79                0 - 99 pg/mL   -Troponin I, High Sensitivity, Initial:   Collection Time: 02/01/25 12:32 AM       Result                      Value             Ref Range           Troponin I, High Sensi*     7                 0 - 13 ng/L    -Sars-CoV-2 and Influenza A/B PCR:   Collection Time: 02/01/25 12:34 AM       Result                      Value             Ref Range           Flu A Result                Not Detected      Not Detected        Flu B Result                Not Detected      Not Detected        Coronavirus 2019, PCR       Not Detected      Not Detected   -Troponin, High Sensitivity, 1 Hour:   Collection Time: 02/01/25  1:34 AM       Result                      Value             Ref Range           Troponin I, High Sensi*     7                 0 - 13 ng/L    -Urinalysis with Reflex Culture and Microscopic:   Collection Time: 02/01/25  2:48 AM       Result                      Value             Ref  Range           Color, Urine                Yellow            Light-Yellow*       Appearance, Urine           Turbid (N)        Clear               Specific Gravity, Urine     1.025             1.005 - 1.035       pH, Urine                   6.0               5.0, 5.5, 6.*       Protein, Urine              10 (TRACE)        NEGATIVE, 10*       Glucose, Urine              Normal            Normal mg/dL        Blood, Urine                NEGATIVE          NEGATIVE            Ketones, Urine              TRACE (A)         NEGATIVE mg/*       Bilirubin, Urine            NEGATIVE          NEGATIVE            Urobilinogen, Urine         Normal            Normal mg/dL        Nitrite, Urine              2+ (A)            NEGATIVE            Leukocyte Esterase, Ur*                       NEGATIVE        250 Felix/uL (A)  -Microscopic Only, Urine:   Collection Time: 02/01/25  2:48 AM       Result                      Value             Ref Range           WBC, Urine                  21-50 (A)         1-5, NONE /H*       RBC, Urine                  >20 (A)           NONE, 1-2, 3*       Squamous Epithelial Ce*     1-9 (SPARSE)      Reference ra*       Bacteria, Urine             1+ (A)            NONE SEEN /H*       Mucus, Urine                3+                Reference ra*       Hyaline Casts, Urine        1+ (A)            NONE /LPF           Calcium Oxalate Winsome*     1+                NONE, 1+ /HPF  .        Procedure  Procedures  Sections of this report were created using voice-to-text technology and may contain errors in translation    Henri Pepe DO  Emergency Medicine         Henri Pepe DO  02/01/25 0593

## 2025-02-01 NOTE — PROGRESS NOTES
Admitted this am for fall and weakness. Awaiting PT/OT evals. Treating for UTI and urine culture is pending.       Delfina Tony, APRN-CNP

## 2025-02-01 NOTE — H&P
History Of Present Illness        Aubrie Valdivia is a 69 y.o. female presenting with Mechanical Fall from Home.      Basically the patient has fallen twice in the past 2 days.  He had fallen yesterday at home which appears to be a mechanical fall in nature.  She called EMS to help her off the ground and back into her chair.  This happened again the next day which prompted her to call EMS.  She denies any head trauma.  She denies any loss of consciousness.  He has been feeling unsteady on her feet and weaker lately.  She feels weak and shaky.      Her vital signs in the ED were essentially unremarkable.  Tmax 37.7, pulse rate 86, respiratory rate 20, /73.  She was saturate 97% on room air.      Her ED diagnostic workup was essentially unremarkable.  Her CBC with differential was completely normal.  Her blood chemistry was pristine.  2 sets of cardiac enzymes were within normal limits.  Rapid influenza and coronavirus PCR testing were pan negative.  A urinalysis was suggestive of infection with positive leuk esterase and pyorrhea.  She had a urine culture in process.      She had chest x-ray and pelvis imaging.      This x-ray read as no acute cardiopulmonary process.      X-ray of the pelvis was read as no acute fracture.  Postsurgical and degenerative changes as noted above.      In the ED the patient was given Rocephin 1 g IV piggyback x 1.  She was given 500 cc normal saline bolus x 1.      She lives at home alone.  She has a daughter that lives in [place].  She has a close friend who is an RN.  Upon questioning on how she obtains her groceries she tells me she has them delivered.        Past Medical History:   Diagnosis Date    Back pain     Hiatal hernia     Hypertension     Lumbosacral radiculitis     Malignant neoplasm of unspecified ovary (Multi) 2011    Malignant neoplasm of ovary    Osteoarthritis     Ovarian cancer (Multi)     Prediabetes     SBO (small bowel obstruction) (Multi)     Sciatica      Umbilical hernia          Surgical History        Past Surgical History:   Procedure Laterality Date    APPENDECTOMY      Appendectomy    BARIATRIC SURGERY  06/01/2021    gastric sleeve- [facility]    CHOLECYSTECTOMY  2012    Cholecystectomy    HYSTERECTOMY  2012    with bilateral oophrectomy    OTHER SURGICAL HISTORY      Resection Of Ovarian Malig. + BSO, Omentectomy, Debulking    OTHER SURGICAL HISTORY      Obstructive bowel    OTHER SURGICAL HISTORY  06/2020    endoscopic SBO repair -           Social History    She reports that she quit smoking about 23 years ago. Her smoking use included cigarettes. She started smoking about 39 years ago. She has a 8 pack-year smoking history. She has never used smokeless tobacco. She reports current alcohol use of about 7.0 standard drinks of alcohol per week. She reports that she does not currently use drugs.      Family History      Family History   Problem Relation Name Age of Onset    Alzheimer's disease Mother Bebe     Arthritis Mother Bebe     Cancer Mother Bebe     Heart disease Father      Heart disease Sister      Drug abuse Brother Kalen     No Known Problems Daughter            Allergies      Patient has no known allergies.      Review of Systems    14-point ROS otherwise negative, as per HPI/Interval History.    General: No change in weight. Yes weakenss. No Fevers/Chills/Night Sweats   Skin: No skin/hair/nail changes. No rashes or sores.  Head:  No trauma. No Headache/nasuea/vomitting.   Eyes: No visual changes. No tearing. No itching.   Ears: No hearing loss. No tinnitus. No vertigo. No discharge.  Nose, Sinuses: No rhinorrhea, No nasal congestion. No epistaxis.  Mouth, Throat, Neck: No bleeding gums, hoarseness, sore throat or swollen neck  Cardiac: No palpitations. No LOW. No PND. No Orthopnea.   Respiratory: No Shortness of Breath. No wheezing. No cough. No hemoptysis.   GI: No nausea/vomiting. No indigestion. No diarrhea. No  "constipation.   Extremities: No numbness or tingling. No paresthesias.   Urinary: No change in urinary frequency. No change in hesitancy. No hematuria. No incontinence.       Physical Exam        Constitutional:  Pleasant. Obese.  Eyes: PERRL, EOMI,   ENMT: mucous membranes moist  Head/Neck: Neck supple, No JVD,   Respiratory/Thorax: Patent airways, CTAB,   Cardiovascular: Regular, rate and rhythm, no murmurs  Gastrointestinal: Soft, non-distended, +BS.  Musculoskeletal: ROM intact, no joint swelling, normal strength  Extremities: peripheral pulses intact; no edema  Neurological: Alert and Oriented x 3; no focal deficits; gross motor and sensation intact; CN II-XII intact. No asterixis.  Psychological: Appropriate mood and behavior  Skin: No lesions, No rashes.         Last Recorded Vitals  Blood pressure 129/65, pulse 80, temperature 37.7 °C (99.9 °F), temperature source Temporal, resp. rate 13, height 1.651 m (5' 5\"), weight 135 kg (298 lb 11.6 oz), SpO2 (!) 93%.    Relevant Results    Lab Results   Component Value Date    WBC 6.0 02/01/2025    HGB 14.5 02/01/2025    HCT 44.8 02/01/2025    MCV 95 02/01/2025     02/01/2025       Lab Results   Component Value Date    GLUCOSE 92 02/01/2025    CALCIUM 9.0 02/01/2025     02/01/2025    K 3.8 02/01/2025    CO2 26 02/01/2025     02/01/2025    BUN 17 02/01/2025    CREATININE 0.97 02/01/2025       Lab Results   Component Value Date    HGBA1C 5.1 11/08/2023         No CT head results found for the past 12 months      Scheduled medications  amLODIPine, 10 mg, oral, Daily  docusate sodium, 100 mg, oral, BID  DULoxetine, 60 mg, oral, Daily  fluticasone, 1 spray, Each Nostril, Daily  heparin (porcine), 7,500 Units, subcutaneous, q8h GINO  lisinopril, 10 mg, oral, Daily  sertraline, 100 mg, oral, Daily      Continuous medications     PRN medications  PRN medications: acetaminophen **OR** acetaminophen **OR** acetaminophen, benzocaine-menthol, " dextromethorphan-guaifenesin, guaiFENesin, ondansetron **OR** ondansetron, polyethylene glycol, pregabalin, traZODone        Assessment/Plan   Assessment & Plan  Ambulatory dysfunction  Patient presents with general weakness and asthenia  This to be exacerbated by an active UTI  Admit to RNF  PT/OT  Fall Precautions  Aspiration Precuations  Up w/ Assist   TSH level added on  U/A obtained and evaluated   Gentle IVF    UTI (urinary tract infection)  Continue with empiric IV antibiotics  Follow-up urine cultures    Lumbosacral radiculitis  Continue patient's home gabapentinoid therapy.  She takes Lyrica.  Continue SNRI    Morbid obesity with BMI of 45.0-49.9, adult (Multi)  Lifestyle and diet modification counseling    Type 2 diabetes mellitus without complication, without long-term current use of insulin (Multi)  POCT every 6 hourly  Insulin sliding scale if blood glucose becomes elevated    Obstructive sleep apnea syndrome  Will inquire if patient is wearing CPAP nocturnally    HTN (hypertension), benign  Continue to monitor BP and adjust antihypertensive medications accordingly    Anxiety  Continue home SSRI therapy        Discharge planning:      Dissipate that the patient will require less than 2 midnight stay for further evaluation management.  Patient is admitted solely for PT/OT evaluation consideration of potential placement to skilled nursing facility versus home with home care.      This Dictation was Transcribed using a Nuance Dragon Voice Recognition System Device (with Compatible Computer + Software) and as such may contain Grammatical Errors and Unintentional Typing Misprints.      I spent 32 minutes in the professional and overall care of this patient.      Phuc Diaz MD

## 2025-02-01 NOTE — NURSING NOTE
Patient arrived to room from ED. Not in any acute distress. Oriented to Call light system and room. Denies any needs. Call light and possessions within reach. Bed alarm on.

## 2025-02-01 NOTE — ASSESSMENT & PLAN NOTE
Patient presents with general weakness and asthenia  This to be exacerbated by an active UTI  Admit to RNF  PT/OT  Fall Precautions  Aspiration Precuations  Up w/ Assist   TSH level added on  U/A obtained and evaluated   Gentle IVF

## 2025-02-02 VITALS
HEART RATE: 88 BPM | TEMPERATURE: 97.2 F | OXYGEN SATURATION: 97 % | HEIGHT: 65 IN | WEIGHT: 280 LBS | BODY MASS INDEX: 46.65 KG/M2 | SYSTOLIC BLOOD PRESSURE: 153 MMHG | DIASTOLIC BLOOD PRESSURE: 78 MMHG | RESPIRATION RATE: 17 BRPM

## 2025-02-02 PROBLEM — N30.01 ACUTE CYSTITIS WITH HEMATURIA: Status: ACTIVE | Noted: 2025-02-02

## 2025-02-02 PROCEDURE — 96372 THER/PROPH/DIAG INJ SC/IM: CPT | Performed by: INTERNAL MEDICINE

## 2025-02-02 PROCEDURE — 2500000004 HC RX 250 GENERAL PHARMACY W/ HCPCS (ALT 636 FOR OP/ED): Performed by: NURSE PRACTITIONER

## 2025-02-02 PROCEDURE — 1100000001 HC PRIVATE ROOM DAILY

## 2025-02-02 PROCEDURE — 2500000002 HC RX 250 W HCPCS SELF ADMINISTERED DRUGS (ALT 637 FOR MEDICARE OP, ALT 636 FOR OP/ED): Performed by: INTERNAL MEDICINE

## 2025-02-02 PROCEDURE — 2500000001 HC RX 250 WO HCPCS SELF ADMINISTERED DRUGS (ALT 637 FOR MEDICARE OP): Performed by: INTERNAL MEDICINE

## 2025-02-02 PROCEDURE — 2500000004 HC RX 250 GENERAL PHARMACY W/ HCPCS (ALT 636 FOR OP/ED): Performed by: INTERNAL MEDICINE

## 2025-02-02 PROCEDURE — 99232 SBSQ HOSP IP/OBS MODERATE 35: CPT | Performed by: NURSE PRACTITIONER

## 2025-02-02 RX ADMIN — AMLODIPINE BESYLATE 10 MG: 10 TABLET ORAL at 09:15

## 2025-02-02 RX ADMIN — LISINOPRIL 10 MG: 10 TABLET ORAL at 09:15

## 2025-02-02 RX ADMIN — ACETAMINOPHEN 650 MG: 325 TABLET ORAL at 03:58

## 2025-02-02 RX ADMIN — PREGABALIN 100 MG: 100 CAPSULE ORAL at 09:19

## 2025-02-02 RX ADMIN — PREGABALIN 100 MG: 100 CAPSULE ORAL at 21:25

## 2025-02-02 RX ADMIN — POLYETHYLENE GLYCOL 3350 17 G: 17 POWDER, FOR SOLUTION ORAL at 09:15

## 2025-02-02 RX ADMIN — HEPARIN SODIUM 7500 UNITS: 5000 INJECTION, SOLUTION INTRAVENOUS; SUBCUTANEOUS at 21:25

## 2025-02-02 RX ADMIN — HEPARIN SODIUM 7500 UNITS: 5000 INJECTION, SOLUTION INTRAVENOUS; SUBCUTANEOUS at 14:46

## 2025-02-02 RX ADMIN — CEFTRIAXONE SODIUM 1 G: 1 INJECTION, SOLUTION INTRAVENOUS at 14:46

## 2025-02-02 RX ADMIN — HEPARIN SODIUM 7500 UNITS: 5000 INJECTION, SOLUTION INTRAVENOUS; SUBCUTANEOUS at 06:38

## 2025-02-02 RX ADMIN — DULOXETINE HYDROCHLORIDE 60 MG: 60 CAPSULE, DELAYED RELEASE ORAL at 09:15

## 2025-02-02 RX ADMIN — FLUTICASONE PROPIONATE 1 SPRAY: 50 SPRAY, METERED NASAL at 11:09

## 2025-02-02 RX ADMIN — NYSTATIN 1 APPLICATION: 100000 POWDER TOPICAL at 21:25

## 2025-02-02 RX ADMIN — SERTRALINE HYDROCHLORIDE 100 MG: 100 TABLET ORAL at 09:15

## 2025-02-02 RX ADMIN — DOCUSATE SODIUM 100 MG: 100 CAPSULE, LIQUID FILLED ORAL at 09:15

## 2025-02-02 ASSESSMENT — PAIN - FUNCTIONAL ASSESSMENT
PAIN_FUNCTIONAL_ASSESSMENT: 0-10
PAIN_FUNCTIONAL_ASSESSMENT: 0-10
PAIN_FUNCTIONAL_ASSESSMENT: UNABLE TO SELF-REPORT

## 2025-02-02 ASSESSMENT — COGNITIVE AND FUNCTIONAL STATUS - GENERAL
PERSONAL GROOMING: A LITTLE
DRESSING REGULAR LOWER BODY CLOTHING: A LITTLE
MOVING TO AND FROM BED TO CHAIR: A LITTLE
WALKING IN HOSPITAL ROOM: A LITTLE
MOBILITY SCORE: 20
CLIMB 3 TO 5 STEPS WITH RAILING: A LITTLE
STANDING UP FROM CHAIR USING ARMS: A LITTLE
DAILY ACTIVITIY SCORE: 22

## 2025-02-02 ASSESSMENT — PAIN SCALES - GENERAL
PAINLEVEL_OUTOF10: 6
PAINLEVEL_OUTOF10: 3
PAINLEVEL_OUTOF10: 3
PAINLEVEL_OUTOF10: 6

## 2025-02-02 ASSESSMENT — PAIN DESCRIPTION - LOCATION: LOCATION: BACK

## 2025-02-02 ASSESSMENT — ACTIVITIES OF DAILY LIVING (ADL): LACK_OF_TRANSPORTATION: NO

## 2025-02-02 NOTE — PROGRESS NOTES
02/02/25 1443   Discharge Planning   Living Arrangements Alone   Support Systems Children;Friends/neighbors   Assistance Needed Cane, grab bars, walker, rollator.   Type of Residence Private residence   Number of Stairs to Enter Residence 2   Number of Stairs Within Residence 0   Do you have animals or pets at home? Yes   Type of Animals or Pets cats   Who is requesting discharge planning? Provider   Home or Post Acute Services None   Expected Discharge Disposition Home H   Does the patient need discharge transport arranged? No   Financial Resource Strain   How hard is it for you to pay for the very basics like food, housing, medical care, and heating? Not hard   Housing Stability   In the last 12 months, was there a time when you were not able to pay the mortgage or rent on time? N   In the past 12 months, how many times have you moved where you were living? 0   At any time in the past 12 months, were you homeless or living in a shelter (including now)? N   Transportation Needs   In the past 12 months, has lack of transportation kept you from medical appointments or from getting medications? no   In the past 12 months, has lack of transportation kept you from meetings, work, or from getting things needed for daily living? No   Patient Choice   Provider Choice list and CMS website (https://medicare.gov/care-compare#search) for post-acute Quality and Resource Measure Data were provided and reviewed with: Patient   Patient / Family choosing to utilize agency / facility established prior to hospitalization No   Stroke Family Assessment   Stroke Family Assessment Needed No     Spoke with patient for admission assessment.   Patient is A&OX3 from home alone.  Prior to admission patient was independent in all ADL's and IADL's, has a walker, cane, and rollator at home, and she is active with Johanne Kitchen from  [employer].   Patient has a strong family/friend support and feels she would be safe returning home on  discharge.     Discussed MOD level therapy recommendations.  Patient feels that she will continue to improve with therapy now that she is feeling better, and is currently declining the need for skilled rehab.  Patient is open to home health care, INTERNAL referral is needed.

## 2025-02-02 NOTE — CARE PLAN
The patient's goals for the shift include increased mobility    The clinical goals for the shift include Safety, monitor labs and vitals      Problem: Fall/Injury  Goal: Not fall by end of shift  Outcome: Progressing  Goal: Be free from injury by end of the shift  Outcome: Progressing  Goal: Verbalize understanding of personal risk factors for fall in the hospital  Outcome: Progressing  Goal: Verbalize understanding of risk factor reduction measures to prevent injury from fall in the home  Outcome: Progressing  Goal: Use assistive devices by end of the shift  Outcome: Progressing  Goal: Pace activities to prevent fatigue by end of the shift  Outcome: Progressing

## 2025-02-02 NOTE — NURSING NOTE
Agree with previous assessment. Patient lying comfortable in bed. Not in any acute distress. Denies any needs. Call light and possessions within reach. Bed alarm on.

## 2025-02-02 NOTE — PROGRESS NOTES
Aubrie Valdivia is a 69 y.o. female on day 0 of admission presenting with UTI (urinary tract infection).      Subjective   No acute issues. Urine culture pending. Afebrile. Awaiting SNF arrangements.        Objective     Last Recorded Vitals  /75 (BP Location: Right arm, Patient Position: Lying)   Pulse 95   Temp 36.7 °C (98.1 °F) (Temporal)   Resp 18   Wt 127 kg (280 lb)   SpO2 94%   Intake/Output last 3 Shifts:    Intake/Output Summary (Last 24 hours) at 2/2/2025 1012  Last data filed at 2/2/2025 0900  Gross per 24 hour   Intake 870 ml   Output 1750 ml   Net -880 ml       Admission Weight  Weight: 135 kg (298 lb 11.6 oz) (02/01/25 0016)    Daily Weight  02/01/25 : 127 kg (280 lb)    Image Results  XR pelvis 1-2 views  Narrative: Interpreted By:  Oc Cai,   STUDY:  XR PELVIS 1-2 VIEWS; ;  2/1/2025 12:54 am      INDICATION:  Signs/Symptoms:weakness, fell to ground.          COMPARISON:  None.      ACCESSION NUMBER(S):  [id number]      ORDERING CLINICIAN:  THEO MATHEW      FINDINGS:  AP view of the pelvis is obtained.      No acute fracture dislocation. Bones are well mineralized.  Mild-to-moderate bilateral hip osteoarthrosis with joint space  narrowing and sclerosis. Degenerative changes of the lower lumbar  spine, SI joints and symphysis pubis.      Multiple vascular calcifications noted in the pelvis. Moderate stool  burden.      Impression: No acute fracture.      Postsurgical and degenerative changes as noted above.      MACRO:  None      Signed by: Oc Cai 2/1/2025 1:00 AM  Dictation workstation:   [id number]  XR chest 1 view  Narrative: Interpreted By:  Oc Cai,   STUDY:  XR CHEST 1 VIEW;  2/1/2025 12:54 am      INDICATION:  Signs/Symptoms:weakness.      COMPARISON:  Chest x-ray 08/22/2013      ACCESSION NUMBER(S):  [id number]      ORDERING CLINICIAN:  THEO MATHEW      FINDINGS:  Multiple overlying leads are present. Study slightly limited by  patient's body habitus and  underpenetration.      CARDIOMEDIASTINAL SILHOUETTE:  Cardiomediastinal silhouette is normal in size and configuration.      LUNGS:  No consolidation, pleural effusion or pneumothorax.      ABDOMEN:  No remarkable upper abdominal findings.      BONES:  Bilateral shoulder osteoarthrosis. Multilevel degenerative changes of  the spine.      Impression: No acute cardiopulmonary process.      MACRO:  None      Signed by: Oc Cai 2/1/2025 12:58 AM  Dictation workstation:   [id number]      Physical Exam  Constitutional:       Appearance: She is obese.   HENT:      Head: Normocephalic and atraumatic.      Nose: Nose normal.      Mouth/Throat:      Mouth: Mucous membranes are moist.      Pharynx: Oropharynx is clear.   Eyes:      Extraocular Movements: Extraocular movements intact.      Pupils: Pupils are equal, round, and reactive to light.   Cardiovascular:      Rate and Rhythm: Normal rate and regular rhythm.      Pulses: Normal pulses.   Pulmonary:      Effort: Pulmonary effort is normal.      Breath sounds: Normal breath sounds.   Abdominal:      General: Abdomen is flat. Bowel sounds are normal.      Palpations: Abdomen is soft.   Musculoskeletal:         General: Normal range of motion.      Comments: Generalized weakness   Skin:     General: Skin is warm and dry.      Capillary Refill: Capillary refill takes less than 2 seconds.   Neurological:      General: No focal deficit present.      Mental Status: She is oriented to person, place, and time.   Psychiatric:         Mood and Affect: Mood normal.         Relevant Results  No results found for this or any previous visit (from the past 24 hours).       Assessment/Plan     Asthenia  -Progressive over time per pt  -PT/OT, will need SNF    Fall  -Fall precautions    UTI  -IV Rocephin pending culture    Chronic Back Pain  -Continue Cymbalta and Lyrica    Hypertension  -Continue current meds  -BP stable, monitor    Morbid Obesity    DVT Prophylaxis  -Heparin  subcutaneous    Dispo  To SNF once arrangements made    Delfina Tony, APRN-CNP

## 2025-02-02 NOTE — CARE PLAN
The patient's goals for the shift include increased mobility    The clinical goals for the shift include monitor labs; PT

## 2025-02-03 ENCOUNTER — TELEPHONE (OUTPATIENT)
Dept: PRIMARY CARE | Facility: CLINIC | Age: 70
End: 2025-02-03
Payer: MEDICARE

## 2025-02-03 LAB
ANION GAP SERPL CALCULATED.3IONS-SCNC: 11 MMOL/L (ref 10–20)
ATRIAL RATE: 76 BPM
BUN SERPL-MCNC: 14 MG/DL (ref 6–23)
CALCIUM SERPL-MCNC: 8.9 MG/DL (ref 8.6–10.3)
CHLORIDE SERPL-SCNC: 107 MMOL/L (ref 98–107)
CO2 SERPL-SCNC: 27 MMOL/L (ref 21–32)
CREAT SERPL-MCNC: 0.85 MG/DL (ref 0.5–1.05)
EGFRCR SERPLBLD CKD-EPI 2021: 75 ML/MIN/1.73M*2
ERYTHROCYTE [DISTWIDTH] IN BLOOD BY AUTOMATED COUNT: 16.1 % (ref 11.5–14.5)
GLUCOSE SERPL-MCNC: 85 MG/DL (ref 74–99)
HCT VFR BLD AUTO: 44.7 % (ref 36–46)
HGB BLD-MCNC: 14.2 G/DL (ref 12–16)
MCH RBC QN AUTO: 30.5 PG (ref 26–34)
MCHC RBC AUTO-ENTMCNC: 31.8 G/DL (ref 32–36)
MCV RBC AUTO: 96 FL (ref 80–100)
NRBC BLD-RTO: 0 /100 WBCS (ref 0–0)
P OFFSET: 150 MS
P ONSET: 119 MS
PLATELET # BLD AUTO: 172 X10*3/UL (ref 150–450)
POTASSIUM SERPL-SCNC: 3.7 MMOL/L (ref 3.5–5.3)
PR INTERVAL: 188 MS
Q ONSET: 213 MS
QRS COUNT: 12 BEATS
QRS DURATION: 82 MS
QT INTERVAL: 378 MS
QTC CALCULATION(BAZETT): 425 MS
QTC FREDERICIA: 408 MS
R AXIS: -39 DEGREES
RBC # BLD AUTO: 4.66 X10*6/UL (ref 4–5.2)
SODIUM SERPL-SCNC: 141 MMOL/L (ref 136–145)
T AXIS: 16 DEGREES
T OFFSET: 402 MS
VENTRICULAR RATE: 76 BPM
WBC # BLD AUTO: 4.4 X10*3/UL (ref 4.4–11.3)

## 2025-02-03 PROCEDURE — 90677 PCV20 VACCINE IM: CPT | Performed by: NURSE PRACTITIONER

## 2025-02-03 PROCEDURE — 2500000004 HC RX 250 GENERAL PHARMACY W/ HCPCS (ALT 636 FOR OP/ED): Performed by: NURSE PRACTITIONER

## 2025-02-03 PROCEDURE — 97116 GAIT TRAINING THERAPY: CPT | Mod: GP,CQ

## 2025-02-03 PROCEDURE — 1100000001 HC PRIVATE ROOM DAILY

## 2025-02-03 PROCEDURE — 97535 SELF CARE MNGMENT TRAINING: CPT | Mod: GO,CO

## 2025-02-03 PROCEDURE — 36415 COLL VENOUS BLD VENIPUNCTURE: CPT | Performed by: NURSE PRACTITIONER

## 2025-02-03 PROCEDURE — 99232 SBSQ HOSP IP/OBS MODERATE 35: CPT | Performed by: NURSE PRACTITIONER

## 2025-02-03 PROCEDURE — 85027 COMPLETE CBC AUTOMATED: CPT | Performed by: NURSE PRACTITIONER

## 2025-02-03 PROCEDURE — 2500000002 HC RX 250 W HCPCS SELF ADMINISTERED DRUGS (ALT 637 FOR MEDICARE OP, ALT 636 FOR OP/ED): Performed by: INTERNAL MEDICINE

## 2025-02-03 PROCEDURE — 97110 THERAPEUTIC EXERCISES: CPT | Mod: GP,CQ

## 2025-02-03 PROCEDURE — 2500000004 HC RX 250 GENERAL PHARMACY W/ HCPCS (ALT 636 FOR OP/ED): Performed by: INTERNAL MEDICINE

## 2025-02-03 PROCEDURE — 80048 BASIC METABOLIC PNL TOTAL CA: CPT | Performed by: NURSE PRACTITIONER

## 2025-02-03 PROCEDURE — G0009 ADMIN PNEUMOCOCCAL VACCINE: HCPCS | Performed by: NURSE PRACTITIONER

## 2025-02-03 PROCEDURE — 2500000001 HC RX 250 WO HCPCS SELF ADMINISTERED DRUGS (ALT 637 FOR MEDICARE OP): Performed by: INTERNAL MEDICINE

## 2025-02-03 RX ADMIN — NYSTATIN 1 APPLICATION: 100000 POWDER TOPICAL at 09:05

## 2025-02-03 RX ADMIN — SERTRALINE HYDROCHLORIDE 100 MG: 100 TABLET ORAL at 09:03

## 2025-02-03 RX ADMIN — TRAZODONE HYDROCHLORIDE 100 MG: 100 TABLET ORAL at 01:49

## 2025-02-03 RX ADMIN — HEPARIN SODIUM 7500 UNITS: 5000 INJECTION, SOLUTION INTRAVENOUS; SUBCUTANEOUS at 14:29

## 2025-02-03 RX ADMIN — PNEUMOCOCCAL 20-VALENT CONJUGATE VACCINE 0.5 ML
2.2; 2.2; 2.2; 2.2; 2.2; 2.2; 2.2; 2.2; 2.2; 2.2; 2.2; 2.2; 2.2; 2.2; 2.2; 2.2; 4.4; 2.2; 2.2; 2.2 INJECTION, SUSPENSION INTRAMUSCULAR at 15:19

## 2025-02-03 RX ADMIN — HEPARIN SODIUM 7500 UNITS: 5000 INJECTION, SOLUTION INTRAVENOUS; SUBCUTANEOUS at 05:16

## 2025-02-03 RX ADMIN — NYSTATIN 1 APPLICATION: 100000 POWDER TOPICAL at 21:30

## 2025-02-03 RX ADMIN — PREGABALIN 100 MG: 100 CAPSULE ORAL at 21:30

## 2025-02-03 RX ADMIN — CEFTRIAXONE SODIUM 1 G: 1 INJECTION, SOLUTION INTRAVENOUS at 14:29

## 2025-02-03 RX ADMIN — FLUTICASONE PROPIONATE 1 SPRAY: 50 SPRAY, METERED NASAL at 09:04

## 2025-02-03 RX ADMIN — DULOXETINE HYDROCHLORIDE 60 MG: 60 CAPSULE, DELAYED RELEASE ORAL at 09:04

## 2025-02-03 RX ADMIN — TRAZODONE HYDROCHLORIDE 100 MG: 100 TABLET ORAL at 21:29

## 2025-02-03 RX ADMIN — LISINOPRIL 10 MG: 10 TABLET ORAL at 09:04

## 2025-02-03 RX ADMIN — AMLODIPINE BESYLATE 10 MG: 10 TABLET ORAL at 09:04

## 2025-02-03 RX ADMIN — DOCUSATE SODIUM 100 MG: 100 CAPSULE, LIQUID FILLED ORAL at 09:03

## 2025-02-03 RX ADMIN — DOCUSATE SODIUM 100 MG: 100 CAPSULE, LIQUID FILLED ORAL at 21:29

## 2025-02-03 RX ADMIN — HEPARIN SODIUM 7500 UNITS: 5000 INJECTION, SOLUTION INTRAVENOUS; SUBCUTANEOUS at 21:30

## 2025-02-03 RX ADMIN — ACETAMINOPHEN 650 MG: 325 TABLET ORAL at 11:55

## 2025-02-03 SDOH — HEALTH STABILITY: PHYSICAL HEALTH: ON AVERAGE, HOW MANY MINUTES DO YOU ENGAGE IN EXERCISE AT THIS LEVEL?: 0 MIN

## 2025-02-03 SDOH — SOCIAL STABILITY: SOCIAL NETWORK: HOW OFTEN DO YOU ATTEND CHURCH OR RELIGIOUS SERVICES?: PATIENT DECLINED

## 2025-02-03 SDOH — SOCIAL STABILITY: SOCIAL INSECURITY: ARE YOU MARRIED, WIDOWED, DIVORCED, SEPARATED, NEVER MARRIED, OR LIVING WITH A PARTNER?: DIVORCED

## 2025-02-03 SDOH — SOCIAL STABILITY: SOCIAL NETWORK: HOW OFTEN DO YOU GET TOGETHER WITH FRIENDS OR RELATIVES?: MORE THAN THREE TIMES A WEEK

## 2025-02-03 SDOH — HEALTH STABILITY: MENTAL HEALTH
DO YOU FEEL STRESS - TENSE, RESTLESS, NERVOUS, OR ANXIOUS, OR UNABLE TO SLEEP AT NIGHT BECAUSE YOUR MIND IS TROUBLED ALL THE TIME - THESE DAYS?: NOT AT ALL

## 2025-02-03 SDOH — SOCIAL STABILITY: SOCIAL NETWORK: HOW OFTEN DO YOU ATTEND MEETINGS OF THE CLUBS OR ORGANIZATIONS YOU BELONG TO?: PATIENT DECLINED

## 2025-02-03 SDOH — HEALTH STABILITY: PHYSICAL HEALTH
HOW OFTEN DO YOU NEED TO HAVE SOMEONE HELP YOU WHEN YOU READ INSTRUCTIONS, PAMPHLETS, OR OTHER WRITTEN MATERIAL FROM YOUR DOCTOR OR PHARMACY?: NEVER

## 2025-02-03 SDOH — SOCIAL STABILITY: SOCIAL NETWORK
IN A TYPICAL WEEK, HOW MANY TIMES DO YOU TALK ON THE PHONE WITH FAMILY, FRIENDS, OR NEIGHBORS?: MORE THAN THREE TIMES A WEEK

## 2025-02-03 SDOH — HEALTH STABILITY: PHYSICAL HEALTH: ON AVERAGE, HOW MANY DAYS PER WEEK DO YOU ENGAGE IN MODERATE TO STRENUOUS EXERCISE (LIKE A BRISK WALK)?: 0 DAYS

## 2025-02-03 SDOH — SOCIAL STABILITY: SOCIAL NETWORK
DO YOU BELONG TO ANY CLUBS OR ORGANIZATIONS SUCH AS CHURCH GROUPS, UNIONS, FRATERNAL OR ATHLETIC GROUPS, OR SCHOOL GROUPS?: PATIENT DECLINED

## 2025-02-03 ASSESSMENT — PAIN SCALES - GENERAL
PAINLEVEL_OUTOF10: 0 - NO PAIN
PAINLEVEL_OUTOF10: 1
PAINLEVEL_OUTOF10: 3
PAINLEVEL_OUTOF10: 0 - NO PAIN
PAINLEVEL_OUTOF10: 5 - MODERATE PAIN
PAINLEVEL_OUTOF10: 3

## 2025-02-03 ASSESSMENT — COGNITIVE AND FUNCTIONAL STATUS - GENERAL
WALKING IN HOSPITAL ROOM: A LITTLE
MOVING TO AND FROM BED TO CHAIR: A LITTLE
DRESSING REGULAR LOWER BODY CLOTHING: A LITTLE
TOILETING: A LITTLE
MOBILITY SCORE: 17
DAILY ACTIVITIY SCORE: 20
TURNING FROM BACK TO SIDE WHILE IN FLAT BAD: A LITTLE
STANDING UP FROM CHAIR USING ARMS: A LITTLE
DRESSING REGULAR UPPER BODY CLOTHING: A LITTLE
HELP NEEDED FOR BATHING: A LITTLE
CLIMB 3 TO 5 STEPS WITH RAILING: TOTAL

## 2025-02-03 ASSESSMENT — ACTIVITIES OF DAILY LIVING (ADL)
BATHING_WHERE_ASSESSED: SITTING SINKSIDE;STANDING SINKSIDE
BATHING_LEVEL_OF_ASSISTANCE: MODERATE ASSISTANCE
HOME_MANAGEMENT_TIME_ENTRY: 60

## 2025-02-03 ASSESSMENT — PAIN - FUNCTIONAL ASSESSMENT
PAIN_FUNCTIONAL_ASSESSMENT: 0-10

## 2025-02-03 ASSESSMENT — PAIN DESCRIPTION - ORIENTATION: ORIENTATION: LOWER

## 2025-02-03 ASSESSMENT — PAIN DESCRIPTION - LOCATION: LOCATION: BACK

## 2025-02-03 ASSESSMENT — PAIN DESCRIPTION - DESCRIPTORS: DESCRIPTORS: ACHING

## 2025-02-03 NOTE — PROGRESS NOTES
Aubrie Valdivia is a 69 y.o. female on day 1 of admission presenting with UTI (urinary tract infection).      Subjective   Patient seen and examined. Sitting up in the chair. States she continues to improve, feels stronger and has been up moving more. Afebrile.        Objective     Last Recorded Vitals  /83 (BP Location: Right arm, Patient Position: Lying)   Pulse 96   Temp 36.7 °C (98.1 °F) (Temporal)   Resp 18   Wt 127 kg (280 lb)   SpO2 92%   Intake/Output last 3 Shifts:    Intake/Output Summary (Last 24 hours) at 2/3/2025 1038  Last data filed at 2/3/2025 0621  Gross per 24 hour   Intake 970 ml   Output 1250 ml   Net -280 ml       Admission Weight  Weight: 135 kg (298 lb 11.6 oz) (02/01/25 0016)    Daily Weight  02/01/25 : 127 kg (280 lb)    Image Results    No new imaging to review.     Physical Exam    General: Alert and oriented x3, pleasant.   Cardiac: Regular rate and rhythm, S1/S2 , no murmur.   Pulmonary: Clear to auscultation on room air.   Abdomen: Soft, round, nontender. BS +x4.   Extremities: No edema.  Skin: No rashes or lesions.      Relevant Results    Scheduled medications  amLODIPine, 10 mg, oral, Daily  cefTRIAXone, 1 g, intravenous, q24h  docusate sodium, 100 mg, oral, BID  DULoxetine, 60 mg, oral, Daily  fluticasone, 1 spray, Each Nostril, Daily  heparin (porcine), 7,500 Units, subcutaneous, q8h GINO  lisinopril, 10 mg, oral, Daily  nystatin, 1 Application, Topical, BID  pneumoc 20-walter conj-dip cr(PF), 0.5 mL, intramuscular, During hospitalization  polyethylene glycol, 17 g, oral, BID  sertraline, 100 mg, oral, Daily      Continuous medications     PRN medications  PRN medications: acetaminophen **OR** acetaminophen **OR** acetaminophen, benzocaine-menthol, dextromethorphan-guaifenesin, guaiFENesin, ondansetron ODT **OR** ondansetron, polyethylene glycol, pregabalin, traZODone     Results for orders placed or performed during the hospital encounter of 02/01/25 (from the past 24  hours)   Basic metabolic panel   Result Value Ref Range    Glucose 85 74 - 99 mg/dL    Sodium 141 136 - 145 mmol/L    Potassium 3.7 3.5 - 5.3 mmol/L    Chloride 107 98 - 107 mmol/L    Bicarbonate 27 21 - 32 mmol/L    Anion Gap 11 10 - 20 mmol/L    Urea Nitrogen 14 6 - 23 mg/dL    Creatinine 0.85 0.50 - 1.05 mg/dL    eGFR 75 >60 mL/min/1.73m*2    Calcium 8.9 8.6 - 10.3 mg/dL   CBC   Result Value Ref Range    WBC 4.4 4.4 - 11.3 x10*3/uL    nRBC 0.0 0.0 - 0.0 /100 WBCs    RBC 4.66 4.00 - 5.20 x10*6/uL    Hemoglobin 14.2 12.0 - 16.0 g/dL    Hematocrit 44.7 36.0 - 46.0 %    MCV 96 80 - 100 fL    MCH 30.5 26.0 - 34.0 pg    MCHC 31.8 (L) 32.0 - 36.0 g/dL    RDW 16.1 (H) 11.5 - 14.5 %    Platelets 172 150 - 450 x10*3/uL     *Note: Due to a large number of results and/or encounters for the requested time period, some results have not been displayed. A complete set of results can be found in Results Review.           Assessment/Plan      Asthenia / Fall  -Progressive over time per pt, she has falls from time to time at home.   -PT/OT, continue to mobilize.   -Likely worsened from baseline due to underlying UTI.   -She continues to improve daily. Will have PT/OT re-eval this AM as she prefers to go home with C instead of SNF.      UTI  -IV Rocephin, continue.   -Urine culture pending with >100,000 enteric bacilli. Awaiting final culture.      Chronic Back Pain  -Continue Cymbalta and Lyrica.     Hypertension  -Continue current meds.  -BP stable, monitor.     Morbid Obesity     DVT Prophylaxis  -Heparin subcutaneous.    Plan  Continues to improve with her strength with treating underlying UTI.   Awaiting final urine culture. Continue ceftriaxone for now.   Continue to mobilize, PT/OT to re-eval today and patient is wanting to go home with HHC instead of SNF at this time.   Anticipate DC tomorrow if urine culture finalized and she continues to improve.         Shiloh Zaldivar, APRN-CNP

## 2025-02-03 NOTE — PROGRESS NOTES
02/03/25 1014   Discharge Planning   Expected Discharge Disposition Home H   Does the patient need discharge transport arranged? No     Home -patient agreeable to [facility] - will need an itnernal referral at discharge.

## 2025-02-03 NOTE — PROGRESS NOTES
Physical Therapy    Physical Therapy Treatment    Patient Name: Aubrie Valdivia  MRN: [id number]  Department: [address]  Room: [address] [place], [place] [address]  Today's Date: 2/3/2025  Time Calculation  Start Time: 1114  Stop Time: 1154  Time Calculation (min): 40 min         Assessment/Plan   PT Assessment  PT Assessment Results: Decreased strength, Decreased range of motion, Decreased endurance, Impaired balance, Decreased mobility, Decreased coordination, Decreased safety awareness, Obesity, Pain  Rehab Prognosis: Good  Barriers to Discharge Home: Caregiver assistance, Physical needs  Caregiver Assistance: Patient lives alone and/or does not have reliable caregiver assistance  Physical Needs: Stair navigation into home limited by function/safety, 24hr mobility assistance needed, High falls risk due to function or environment, Ambulating household distances limited by function/safety  Evaluation/Treatment Tolerance: Patient limited by fatigue, Patient limited by pain  Medical Staff Made Aware: Yes  Strengths: Premorbid level of function  Barriers to Participation: Comorbidities  End of Session Communication: Bedside nurse, Care Coordinator  Assessment Comment: Pt limited by c/o back issues, was able to tolerate increased ambulation distance. Fatigued quickly. Pt unable to complete one step up onto platform step, limited by weakness, fear of falling.  End of Session Patient Position: Up in chair, Alarm on     PT Plan  Treatment/Interventions: Transfer training, Gait training, Stair training, Balance training, Strengthening, Endurance training, Therapeutic exercise, Therapeutic activity  PT Plan: Ongoing PT  PT Frequency: 4 times per week  PT Discharge Recommendations: Moderate intensity level of continued care  Equipment Recommended upon Discharge: Wheeled walker  PT Recommended Transfer Status: Assist x1, Assistive device  PT - OK to Discharge: Yes      General Visit Information:   PT  Visit  PT Received On: 02/03/25  General  Reason for  "Referral: impaired mobility, UTI  Referred By: Dr. Diaz  Past Medical History Relevant to Rehab: HTN, ovarian cancer, OA, DM, sciatica, bariatric sx, tobacco abuse, ETOH use, MO, BALTAZAR, anxiety  Prior to Session Communication: Bedside nurse  Patient Position Received: Up in chair, Alarm on  Preferred Learning Style: verbal, visual  General Comment: Agreeable to treatment.    Subjective   Precautions:  Precautions  Hearing/Visual Limitations: glasses  Medical Precautions: Fall precautions        Objective   Pain:  Pain Assessment  Pain Assessment: 0-10  0-10 (Numeric) Pain Score: 5 - Moderate pain  Pain Type: Chronic pain  Pain Location: Back (Pt also reporting \"arthritis pain\" right knee)  Pain Orientation: Lower  Pain Interventions: Ambulation/increased activity (RN made aware of pain rating and request for tylenol.)    Cognition:  Cognition  Overall Cognitive Status: Within Functional Limits  Cognition Comments: Pleasant and cooperative. Follows commands well.    Activity Tolerance:  Activity Tolerance  Endurance: Decreased tolerance for upright activites    Treatments:  Therapeutic Exercise  Therapeutic Exercise Performed: Yes  Therapeutic Exercise Activity 1: Pt performed seated bilat LE ankle pumps, heel raises, glute sets, LAQ, hip flexion (effortful) mo hip adduction and resisted hip abduction x15 reps each. Rest breaks as needed due to fatigue.    Ambulation/Gait Training 1  Surface 1: Level tile  Device 1: Bariatric rolling walker  Assistance 1: Contact guard  Quality of Gait 1: Diminished heel strike, Shuffling gait, Decreased step length, Forward flexed posture  Comments/Distance (ft) 1: Pt ambulated with HD RW ~60' x1 CGA. Pt demonstrated slow, reciprocal gait. Decreased bilat step height and lenght. Mildly forward flexed posture \"because of all the issued with my back\" Mod fatigue noted    Transfer 1  Transfer From 1: Chair with arms to  Transfer to 1: Stand  Technique 1: Sit to stand  Transfer Device " "1: Walker  Transfer Level of Assistance 1: Close supervision  Transfers 2  Transfer From 2: Stand to  Transfer to 2: Sit, Chair with arms  Technique 2: Stand to sit  Transfer Device 2: Walker  Transfer Level of Assistance 2: Close supervision    Stairs  Stairs: Yes  Stairs  Rails 1: None (Comment) (Per pt has on [address] with grab bar left and \"I hold onto the door frame with my right. Someone is always with me\")  Curb Step 1: Yes  Device 1: No device (Pt declined trialing single step using HD RW. Hand in hand x2 provided)  Assistance 1: Minimum assistance, Minimal verbal cues (x2)  Comment/Number of Steps 1: Pt able to step up onto step with left LE without difficulty, hand held assist of 2--pt was unable to bring right LE onto step to complete one [place].  Stairs 2  Rails 2: None (Comment)  Curb Step 2: Yes  Device 2: No device (Hand held assist of 2)  Assistance 2: Minimum assistance, Minimal verbal cues  Comment/Number of Steps 2: Pt able to step up onto step with right LE, very effortful, unable to bring left LE onto step to complete on [address].    Outcome Measures:  [facility] Basic Mobility  Turning from your back to your side while in a flat bed without using bedrails: None  Moving from lying on your back to sitting on the side of a flat bed without using bedrails: A little  Moving to and from bed to chair (including a wheelchair): A little  Standing up from a chair using your arms (e.g. wheelchair or bedside chair): A little  To walk in hospital room: A little  Climbing 3-5 steps with railing: Total  Basic Mobility - Total Score: 17    Education Documentation  Precautions, taught by Lindsay Robledo PTA at 2/3/2025 12:13 PM.  Learner: Patient  Readiness: Acceptance  Method: Explanation  Response: Verbalizes Understanding, Needs Reinforcement  Comment: safety during transfers and ambulation.    Home Exercise Program, taught by Lindsay Robledo PTA at 2/3/2025 12:13 PM.  Learner: Patient  Readiness: Acceptance  Method: " Explanation  Response: Verbalizes Understanding, Needs Reinforcement  Comment: safety during transfers and ambulation.    Mobility Training, taught by Lindsay Robledo PTA at 2/3/2025 12:13 PM.  Learner: Patient  Readiness: Acceptance  Method: Explanation  Response: Verbalizes Understanding, Needs Reinforcement  Comment: safety during transfers and ambulation.    Education Comments  No comments found.        OP EDUCATION:       Encounter Problems       Encounter Problems (Active)       Mobility       STG - Patient will ambulate 45' w/ min assist and RW, w/o LOB (Progressing)       Start:  02/01/25    Expected End:  02/07/25            STG - Patient will ascend and descend four to six stairs w/ one rail ,cane and min assist (Progressing)       Start:  02/01/25    Expected End:  02/07/25            t will tolerate BLE exercises at 15 reps or > consistently to promote functional strength, endurance, balance and safe mobility  (Progressing)       Start:  02/01/25    Expected End:  02/07/25               PT Transfers       STG - Patient to transfer to and from sit to supine w/ distant supervision and no LOB (Progressing)       Start:  02/01/25    Expected End:  02/07/25            STG - Patient will transfer sit to and from stand w/ CGA, proper technique and no LOB (Progressing)       Start:  02/01/25    Expected End:  02/07/25               Safety       LTG - Patient will utilize safety techniques w/ functional mobility (Progressing)       Start:  02/01/25    Expected End:  02/07/25

## 2025-02-03 NOTE — CARE PLAN
The patient's goals for the shift include increased mobility.    The clinical goals for the shift include comfort, pain management, maintain safety, encourage ambulating to BR, and monitor labs/VS.     Problem: Safety - Adult  Goal: Free from fall injury  Outcome: Progressing     Problem: Discharge Planning  Goal: Discharge to home or other facility with appropriate resources  Outcome: Progressing     Problem: Chronic Conditions and Co-morbidities  Goal: Patient's chronic conditions and co-morbidity symptoms are monitored and maintained or improved  Outcome: Progressing     Problem: Nutrition  Goal: Nutrient intake appropriate for maintaining nutritional needs  Outcome: Progressing     Problem: Fall/Injury  Goal: Not fall by end of shift  Outcome: Progressing  Goal: Be free from injury by end of the shift  Outcome: Progressing  Goal: Verbalize understanding of personal risk factors for fall in the hospital  Outcome: Progressing  Goal: Verbalize understanding of risk factor reduction measures to prevent injury from fall in the home  Outcome: Progressing  Goal: Use assistive devices by end of the shift  Outcome: Progressing  Goal: Pace activities to prevent fatigue by end of the shift  Outcome: Progressing     Problem: Skin  Goal: Decreased wound size/increased tissue granulation at next dressing change  Outcome: Progressing  Goal: Participates in plan/prevention/treatment measures  Outcome: Progressing  Goal: Prevent/manage excess moisture  Outcome: Progressing  Goal: Prevent/minimize sheer/friction injuries  Outcome: Progressing  Goal: Promote/optimize nutrition  Outcome: Progressing  Goal: Promote skin healing  Outcome: Progressing     Problem: Diabetes  Goal: Achieve decreasing blood glucose levels by end of shift  Outcome: Progressing  Goal: Increase stability of blood glucose readings by end of shift  Outcome: Progressing  Goal: Decrease in ketones present in urine by end of shift  Outcome: Progressing  Goal:  Maintain electrolyte levels within acceptable range throughout shift  Outcome: Progressing  Goal: Maintain glucose levels >70mg/dl to <250mg/dl throughout shift  Outcome: Progressing  Goal: No changes in neurological exam by end of shift  Outcome: Progressing  Goal: Learn about and adhere to nutrition recommendations by end of shift  Outcome: Progressing  Goal: Vital signs within normal range for age by end of shift  Outcome: Progressing  Goal: Increase self care and/or family involovement by end of shift  Outcome: Progressing  Goal: Receive DSME education by end of shift  Outcome: Progressing     Problem: Pain  Goal: Takes deep breaths with improved pain control throughout the shift  Outcome: Progressing  Goal: Turns in bed with improved pain control throughout the shift  Outcome: Progressing  Goal: Walks with improved pain control throughout the shift  Outcome: Progressing  Goal: Performs ADL's with improved pain control throughout shift  Outcome: Progressing  Goal: Participates in PT with improved pain control throughout the shift  Outcome: Progressing  Goal: Free from opioid side effects throughout the shift  Outcome: Progressing  Goal: Free from acute confusion related to pain meds throughout the shift  Outcome: Progressing     Problem: Deep Vein Thrombosis  Goal: I will remain free from complications of deep vein thrombosis and maintain current level of mobility  Outcome: Progressing

## 2025-02-03 NOTE — CARE PLAN
Problem: Safety - Adult  Goal: Free from fall injury  2/3/2025 0942 by Jennifer Hull, RN  Outcome: Progressing  2/3/2025 0942 by Jennifer Hull, RN  Outcome: Progressing   The patient's goals for the shift include increased mobility    The clinical goals for the shift include comfort, pain management, maintain safety, encourage ambulating to BR, and monitor labs/VS

## 2025-02-03 NOTE — PROGRESS NOTES
Occupational Therapy    OT Treatment    Patient Name: Aubrie Valdivia  MRN: [id number]  Department: [address]  Room: [facility]  Today's Date: 2/3/2025  Time Calculation  Start Time: 0925  Stop Time: 1025  Time Calculation (min): 60 min        Assessment:  OT Assessment: Pt making good progess toward POC goals, continue to recommend OT services this acute stay.  Prognosis: Good  Barriers to Discharge Home: Caregiver assistance  Caregiver Assistance: Patient lives alone and/or does not have reliable caregiver assistance  Evaluation/Treatment Tolerance: Patient tolerated treatment well  End of Session Communication: Bedside nurse  End of Session Patient Position: Up in chair, Alarm on (all needs in reach)  OT Assessment Results: Decreased ADL status, Decreased upper extremity strength, Decreased endurance, Decreased sensation, Decreased functional mobility, Decreased IADLs  Prognosis: Good  Evaluation/Treatment Tolerance: Patient tolerated treatment well  Plan:  Treatment Interventions: ADL retraining, Functional transfer training, UE strengthening/ROM, Endurance training, Patient/family training, Equipment evaluation/education, Compensatory technique education, Neuromuscular reeducation  OT Frequency: 3 times per week  OT Discharge Recommendations: Low intensity level of continued care  Equipment Recommended upon Discharge: Wheeled walker (HD walker)  OT Recommended Transfer Status: Assistive equipment (Comment), Assist of 1  OT - OK to Discharge: Yes  Treatment Interventions: ADL retraining, Functional transfer training, UE strengthening/ROM, Endurance training, Patient/family training, Equipment evaluation/education, Compensatory technique education, Neuromuscular reeducation    Subjective   Previous Visit Info:  OT Last Visit  OT Received On: 02/03/25  General:  General  Reason for Referral: impaired mobility, UTI  Referred By: Dr. Diaz  Past Medical History Relevant to Rehab: HTN, ovarian cancer, OA, DM, sciatica,  bariatric sx, tobacco abuse, ETOH use, MO, BALTAZAR, anxiety  Patient Position Received: Bed, 3 rail up, Alarm on  General Comment: Agreeable to treatment.  Precautions:  Hearing/Visual Limitations: glasses  Medical Precautions: Fall precautions     Date/Time Vitals Session Patient Position Pulse Resp SpO2 BP MAP (mmHg)    02/03/25 0903 --  --  --  --  92 %  --  --           Pain:  Pain Assessment  Pain Assessment: 0-10    Objective    Cognition:  Cognition  Overall Cognitive Status: Within Functional Limits  Activities of Daily Living: Grooming  Grooming Level of Assistance: Modified independent  Grooming Where Assessed: Sitting sinkside  Grooming Comments: oral care, washing face, shaving chin, brushing hair    UE Bathing  UE Bathing Level of Assistance: Distant supervision  UE Bathing Where Assessed: Sitting sinkside  UE Bathing Comments: Sponge bathing    LE Bathing  LE Bathing Level of Assistance: Moderate assistance  LE Bathing Where Assessed: Sitting sinkside, Standing sinkside  LE Bathing Comments: pt sponge baths BLE, assist with feet, posterior    UE Dressing  UE Dressing Level of Assistance: Minimum assistance  UE Dressing Where Assessed: Chair  UE Dressing Comments: [facility] gown    LE Dressing  LE Dressing: Yes  LE Dressing Adaptive Equipment: Reacher  Sock Level of Assistance: Moderate assistance  LE Dressing Where Assessed: Chair  LE Dressing Comments: pt doffed socks using reacher, assist to mirtha. Pt has sock aid at home, states she knows how to use it.    Toileting  Toileting Level of Assistance: Close supervision  Where Assessed: Toilet (BSC over toilet)  Toileting Comments: hygiene only, after BM  Bed Mobility/Transfers: Bed Mobility 1  Bed Mobility 1: Supine to sitting  Level of Assistance 1: Close supervision  Bed Mobility Comments 1: HOB elevated ~ 60 degrees    Transfer 1  Transfer From 1: Bed to  Transfer to 1: Toilet  Technique 1: To right, To left  Transfer Device 1: Walker  (HD)  Transfer Level of Assistance 1: Contact guard  Trials/Comments 1: cues for grab bar use  Transfers 2  Transfer From 2: Toilet to  Transfer to 2: Chair with arms  Technique 2: To right, To left  Transfer Device 2: Walker (HD)  Transfer Level of Assistance 2: Contact guard  Trials/Comments 2: cues for safe transfer tech  Transfers 3  Transfer From 3: Chair with arms to  Transfer to 3: Chair with arms  Technique 3: To right, To left  Transfer Device 3: Walker  Transfer Level of Assistance 3: Close supervision  Transfers 4  Transfer From 4: Chair with arms to  Transfer to 4: Chair with arms  Technique 4: To right, To left  Transfer Device 4: Walker  Transfer Level of Assistance 4: Close supervision  Trials/Comments 4: good carry over of learned transfer technique    Functional Mobility:  Functional Mobility 1  Surface 1: Level tile  Device 1: Bariatric rolling walker  Assistance 1: Contact guard  Quality of Functional Mobility 1:  (steady pace, fair balance)  Comments 1: ~ 10 feet x4 trials in room    Outcome Measures:[facility] Daily Activity  Putting on and taking off regular lower body clothing: A little  Bathing (including washing, rinsing, drying): A little  Putting on and taking off regular upper body clothing: A little  Toileting, which includes using toilet, bedpan or urinal: A little  Taking care of personal grooming such as brushing teeth: None  Eating Meals: None  Daily Activity - Total Score: 20    Education Documentation  ADL Training, taught by JORGE A Weber at 2/3/2025 10:38 AM.  Learner: Patient  Readiness: Acceptance  Method: Explanation  Response: Verbalizes Understanding, Demonstrated Understanding  Comment: safe transfer techniques, energy saving principles, AE use    Education Comments  No comments found.      Goals:  Encounter Problems       Encounter Problems (Active)       OT Goals       ADLs (Progressing)       Start:  02/01/25    Expected End:  03/08/25       Patient will complete ADL  tasks, with modified independence, using AE need in order to increase patient's safety and independence with self-care tasks.          Functional Transfers (Progressing)       Start:  02/01/25    Expected End:  03/08/25       Patient will complete functional transfers with modified independence, using least restrictive device, in order to increase patient's safety and independence with daily tasks.          Functional Mobility  (Progressing)       Start:  02/01/25    Expected End:  03/08/25       Patient will demonstrate the ability to complete item retrieval and functional mobility with modified independence, in order to increase safety and independence with daily tasks.          Activity Tolerance  (Progressing)       Start:  02/01/25    Expected End:  03/08/25       Patient will demonstrate the ability to participate in functional activity at least >/= 20 minutes in order to increase patient's safety and independence with daily tasks.

## 2025-02-03 NOTE — PROGRESS NOTES
Occupational Therapy  Evaluation        02/01/25 1304   OT Last Visit   OT Received On 02/01/25   General   Reason for Referral decline in ADLs, fall, UTI   Referred By Dr. Diaz   Past Medical History Relevant to Rehab hernia of abdominal wall, HTN, OA, peripheral edema, neuropathy, cervical raidculopathy   Family/Caregiver Present No   Prior to Session Communication Bedside nurse   Patient Position Received Bed, 3 rail up;Alarm on   Preferred Learning Style verbal;visual   General Comment Patient is a 69 year old female who presented to the ED with c/o fall and generalized weakness. Patient admitted with UTI. She is cleared by nursing for therapy. Patient in bed upon arrival and agreeable to participate. +2L O2 via NC   Precautions   Medical Precautions Fall precautions;Oxygen therapy device and L/min  (2L O2 via NC)   Pain Assessment   Pain Assessment 0-10   0-10 (Numeric) Pain Score 0 - No pain  (initally no pain, once ambulating reports discomfort down her R LE)   Cognition   Overall Cognitive Status WFL   Cognition Comments pleasant and cooperative, able to follow commands throughout   Home Living   Type of Home Condo   Lives With Alone   Home Adaptive Equipment Walker rolling or standard;Cane;Reacher;Sock aid  (rollator, 2WW)   Home Layout One level   Home Access Stairs to enter with rails   Entrance Stairs-Rails   (one railing)   Entrance Stairs-Number of Steps 4   Bathroom Shower/Tub   (cut out tub with small lip)   Bathroom Equipment Grab bars in shower   Home Living Comments patient reports 5 falls in the last 3 months, 2 of which were in the last 2 days leading to this admission   Prior Function Per Pt/Caregiver Report   Level of [place] Independent with ADLs and functional transfers;Independent with homemaking with ambulation   Receives Help From Friends   ADL Assistance Independent   Homemaking Assistance Independent   Ambulatory Assistance Independent  (with rollator)   Leisure Plair    Prior Function Comments friends provide transportation   IADL History   Homemaking Responsibilities Yes   IADL Comments patient does her own cooking, cleaning and laundry   ADL   Eating Assistance Stand by   Eating Deficit Setup   Grooming Assistance Stand by   Grooming Deficit Setup  (seated, per clinical judgement)   Bathing Assistance Moderate   Bathing Deficit Setup;Steadying;Supervision/safety;Increased time to complete ;Buttocks;Use of adaptive equipment  (per clinical judgement)   UE Dressing Assistance Minimal   UE Dressing Deficit Pull around back;Pull down in back  (per clinical judgement)   LE Dressing Assistance Moderate   LE Dressing Deficit Requires assistive device for steadying;Steadying;Supervision/safety;Increased time to complete;Use of adaptive equipment  (per clinical judgement)   Toileting Assistance with Device Moderate   Toileting Deficit Steadying;Supervison/safety;Increased time to complete;Bedside commode;Clothing management up;Clothing management down  (per clinical judgement)   Activity Tolerance   Endurance Decreased tolerance for upright activites   Activity Tolerance Comments fatigues easily   Bed Mobility   Bed Mobility Yes   Bed Mobility 1   Bed Mobility 1 Supine to sitting   Level of Assistance 1 Minimum assistance   Bed Mobility Comments 1 head of bed elevated, effortful, use of bed rails, assist for trunk up   Functional Mobility   Functional Mobility Performed Yes   Functional Mobility 1   Surface 1 Level tile   Device 1 Bariatric rolling walker   Assistance 1 Minimum assistance;Moderate assistance   Comments 1 ~2 steps from EOB to the chair, patient's B LE very tremulous when standing   Transfers   Transfer Yes   Transfer 1   Transfer From 1 Bed to   Transfer to 1 Stand   Technique 1 Sit to stand   Transfer Device 1 Walker  (HD walker)   Transfer Level of Assistance 1 Minimum assistance   Trials/Comments 1 from elevated height of bed   Transfers 2   Transfer From 2 Chair with  arms to   Transfer to 2 Stand   Technique 2 Sit to stand   Transfer Device 2 Walker  (HD walker)   Transfer Level of Assistance 2 Moderate assistance   Static Sitting Balance   Static Sitting-Balance Support Feet supported;No upper extremity supported   Static Sitting-Level of Assistance Close supervision   Static Standing Balance   Static Standing-Balance Support Bilateral upper extremity supported   Static Standing-Level of Assistance Minimum assistance   Static Standing-Comment/Number of Minutes ~20 seconds   Sensation   Sensation Comment patient reports numbness/tingling in bilateral hands   Strength   Strength Comments B UE 3+/5 grossly   Coordination   Movements are Fluid and Coordinated No   Upper Body Coordination WFL   Lower Body Coordination B LE tremulous when bearing weight   Hand Function   Gross Grasp Functional   Coordination Functional   RUE    RUE  X  (generalized weakness)   LUE    LUE X  (generalized weakness)   IP OT Assessment   OT Assessment Patient is a 69 year old female admitted with UTI. Patient is presenting below her baseline level with a decline in strength, balance, activity tolerance, and function, resulting in an increased need for assist with ADL/IADL tasks. Recommend skilled OT to maximize patient's safety and independence with daily tasks.   Prognosis Good   Barriers to Discharge Home Caregiver assistance;Physical needs   Caregiver Assistance Patient lives alone and/or does not have reliable caregiver assistance   Physical Needs 24hr mobility assistance needed;Intermittent ADL assistance needed;High falls risk due to function or environment   Evaluation/Treatment Tolerance Patient limited by fatigue   End of Session Communication Bedside nurse   End of Session Patient Position Up in chair;Alarm on   OT Assessment   OT Assessment Results Decreased ADL status;Decreased upper extremity strength;Decreased safe judgment during ADL;Decreased endurance;Decreased sensation;Decreased  functional mobility;Decreased gross motor control;Decreased IADLs   Strengths Ability to acquire knowledge   Barriers to Participation Comorbidities   Inpatient/Swing Bed or Outpatient   Inpatient/Swing Bed or Outpatient Inpatient   Inpatient Plan   Equipment Recommended upon Discharge Wheeled walker  (HD walker)   Treatment Interventions ADL retraining;Functional transfer training;UE strengthening/ROM;Endurance training;Patient/family training;Equipment evaluation/education;Compensatory technique education;Neuromuscular reeducation   OT Frequency 3 times per week   OT - OK to Discharge Yes   OT Discharge Recommendations Moderate intensity level of continued care   OT Recommended Transfer Status Assistive equipment (Comment);Assist of 1

## 2025-02-04 ENCOUNTER — HOME HEALTH ADMISSION (OUTPATIENT)
Dept: HOME HEALTH SERVICES | Facility: HOME HEALTH | Age: 70
End: 2025-02-04
Payer: MEDICARE

## 2025-02-04 ENCOUNTER — PHARMACY VISIT (OUTPATIENT)
Dept: PHARMACY | Facility: CLINIC | Age: 70
End: 2025-02-04
Payer: MEDICARE

## 2025-02-04 VITALS
BODY MASS INDEX: 46.72 KG/M2 | RESPIRATION RATE: 18 BRPM | SYSTOLIC BLOOD PRESSURE: 142 MMHG | OXYGEN SATURATION: 92 % | HEART RATE: 87 BPM | HEIGHT: 65 IN | WEIGHT: 280.4 LBS | TEMPERATURE: 97.9 F | DIASTOLIC BLOOD PRESSURE: 73 MMHG

## 2025-02-04 PROBLEM — Z23 ENCOUNTER FOR IMMUNIZATION: Status: ACTIVE | Noted: 2025-02-04

## 2025-02-04 LAB — BACTERIA UR CULT: ABNORMAL

## 2025-02-04 PROCEDURE — 97110 THERAPEUTIC EXERCISES: CPT | Mod: GP,CQ

## 2025-02-04 PROCEDURE — 2500000001 HC RX 250 WO HCPCS SELF ADMINISTERED DRUGS (ALT 637 FOR MEDICARE OP): Performed by: INTERNAL MEDICINE

## 2025-02-04 PROCEDURE — RXMED WILLOW AMBULATORY MEDICATION CHARGE

## 2025-02-04 PROCEDURE — 2500000002 HC RX 250 W HCPCS SELF ADMINISTERED DRUGS (ALT 637 FOR MEDICARE OP, ALT 636 FOR OP/ED): Performed by: INTERNAL MEDICINE

## 2025-02-04 PROCEDURE — 2500000004 HC RX 250 GENERAL PHARMACY W/ HCPCS (ALT 636 FOR OP/ED): Performed by: INTERNAL MEDICINE

## 2025-02-04 PROCEDURE — 97116 GAIT TRAINING THERAPY: CPT | Mod: GP,CQ

## 2025-02-04 PROCEDURE — 99239 HOSP IP/OBS DSCHRG MGMT >30: CPT | Performed by: NURSE PRACTITIONER

## 2025-02-04 RX ORDER — CEFUROXIME AXETIL 500 MG/1
500 TABLET ORAL 2 TIMES DAILY
Qty: 10 TABLET | Refills: 0 | Status: SHIPPED | OUTPATIENT
Start: 2025-02-04 | End: 2025-02-09

## 2025-02-04 RX ADMIN — ACETAMINOPHEN 650 MG: 325 TABLET ORAL at 11:46

## 2025-02-04 RX ADMIN — AMLODIPINE BESYLATE 10 MG: 10 TABLET ORAL at 08:44

## 2025-02-04 RX ADMIN — LISINOPRIL 10 MG: 10 TABLET ORAL at 08:44

## 2025-02-04 RX ADMIN — SERTRALINE HYDROCHLORIDE 100 MG: 100 TABLET ORAL at 08:44

## 2025-02-04 RX ADMIN — DOCUSATE SODIUM 100 MG: 100 CAPSULE, LIQUID FILLED ORAL at 08:44

## 2025-02-04 RX ADMIN — HEPARIN SODIUM 7500 UNITS: 5000 INJECTION, SOLUTION INTRAVENOUS; SUBCUTANEOUS at 06:40

## 2025-02-04 RX ADMIN — NYSTATIN 1 APPLICATION: 100000 POWDER TOPICAL at 09:30

## 2025-02-04 RX ADMIN — PREGABALIN 100 MG: 100 CAPSULE ORAL at 11:46

## 2025-02-04 RX ADMIN — DULOXETINE HYDROCHLORIDE 60 MG: 60 CAPSULE, DELAYED RELEASE ORAL at 08:44

## 2025-02-04 ASSESSMENT — COGNITIVE AND FUNCTIONAL STATUS - GENERAL
CLIMB 3 TO 5 STEPS WITH RAILING: A LOT
CLIMB 3 TO 5 STEPS WITH RAILING: A LITTLE
HELP NEEDED FOR BATHING: A LITTLE
MOVING TO AND FROM BED TO CHAIR: A LITTLE
WALKING IN HOSPITAL ROOM: A LITTLE
WALKING IN HOSPITAL ROOM: A LITTLE
DRESSING REGULAR LOWER BODY CLOTHING: A LITTLE
MOBILITY SCORE: 22
STANDING UP FROM CHAIR USING ARMS: A LITTLE
TURNING FROM BACK TO SIDE WHILE IN FLAT BAD: A LITTLE
DAILY ACTIVITIY SCORE: 22
MOBILITY SCORE: 18

## 2025-02-04 ASSESSMENT — PAIN DESCRIPTION - LOCATION: LOCATION: BACK

## 2025-02-04 ASSESSMENT — PAIN - FUNCTIONAL ASSESSMENT
PAIN_FUNCTIONAL_ASSESSMENT: UNABLE TO SELF-REPORT
PAIN_FUNCTIONAL_ASSESSMENT: 0-10

## 2025-02-04 ASSESSMENT — PAIN SCALES - GENERAL
PAINLEVEL_OUTOF10: 8
PAINLEVEL_OUTOF10: 8
PAINLEVEL_OUTOF10: 0 - NO PAIN

## 2025-02-04 ASSESSMENT — PAIN DESCRIPTION - ORIENTATION: ORIENTATION: LOWER

## 2025-02-04 NOTE — CARE PLAN
Problem: Safety - Adult  Goal: Free from fall injury  Outcome: Progressing  Flowsheets (Taken 2/4/2025 0039)  Free from fall injury:   Based on caregiver fall risk screen, instruct family/caregiver to ask for assistance with transferring infant if caregiver noted to have fall risk factors   Instruct family/caregiver on patient safety     Problem: Discharge Planning  Goal: Discharge to home or other facility with appropriate resources  Outcome: Progressing  Flowsheets (Taken 2/4/2025 0039)  Discharge to home or other facility with appropriate resources:   Refer to discharge planning if patient needs post-hospital services based on physician order or complex needs related to functional status, cognitive ability or social support system   Arrange for interpreters to assist at discharge as needed   Identify discharge learning needs (meds, wound care, etc)   Arrange for needed discharge resources and transportation as appropriate   Identify barriers to discharge with patient and caregiver     Problem: Chronic Conditions and Co-morbidities  Goal: Patient's chronic conditions and co-morbidity symptoms are monitored and maintained or improved  Outcome: Progressing  Flowsheets (Taken 2/4/2025 0039)  Care Plan - Patient's Chronic Conditions and Co-Morbidity Symptoms are Monitored and Maintained or Improved:   Update acute care plan with appropriate goals if chronic or comorbid symptoms are exacerbated and prevent overall improvement and discharge   Collaborate with multidisciplinary team to address chronic and comorbid conditions and prevent exacerbation or deterioration   Monitor and assess patient's chronic conditions and comorbid symptoms for stability, deterioration, or improvement     Problem: Nutrition  Goal: Nutrient intake appropriate for maintaining nutritional needs  Outcome: Progressing   The patient's goals for the shift include increased mobility    The clinical goals for the shift include safety,monitor vs and  labs

## 2025-02-04 NOTE — DISCHARGE SUMMARY
Discharge Diagnosis  UTI (urinary tract infection)    Issues Requiring Follow-Up  As above    Discharge Meds     Medication List      START taking these medications     cefuroxime 500 mg tablet; Commonly known as: Ceftin; Take 1 tablet (500   mg) by mouth 2 times a day for 5 days.     CONTINUE taking these medications     amLODIPine 10 mg tablet; Commonly known as: Norvasc; 1 TAB BY MOUTH ONCE   A DAY   docusate sodium 100 mg capsule; Commonly known as: Colace   DULoxetine 60 mg DR capsule; Commonly known as: Cymbalta; 1 CAP BY MOUTH   ONCE A DAY   fluticasone 50 mcg/actuation nasal spray; Commonly known as: Flonase; 1   SPRAY EACH NOSTRIL ONCE A DAY   lisinopril 10 mg tablet; 1 TAB BY MOUTH ONCE A DAY   loratadine 10 mg tablet; Commonly known as: Claritin; 1 TAB BY MOUTH   ONCE A DAY   meloxicam 15 mg tablet; Commonly known as: Mobic; 1 TAB BY MOUTH EVERY   MORNING   MULTIVITAMIN WOMEN 50 PLUS ORAL   nystatin cream; Commonly known as: Mycostatin; APPLY 1 APPLCIATION   TOPICALLY TWICE A DAY   pregabalin 100 mg capsule; Commonly known as: Lyrica; Take 1 capsule   (100 mg) by mouth 2 times a day.   semaglutide (weight loss) 2.4 mg/0.75 mL pen injector; Inject 2.4 mg   under the skin 1 (one) time per week.   sertraline 100 mg tablet; Commonly known as: Zoloft; 1 TAB BY MOUTH ONCE   A DAY   traZODone 100 mg tablet; Commonly known as: Desyrel; 1 TAB BY MOUTH   DAILY AT BEDTIME     STOP taking these medications     ketoconazole 2 % cream; Commonly known as: NIZOral     ASK your doctor about these medications     tirzepatide (weight loss) 12.5 mg/0.5 mL injection; Commonly known as:   Zepbound; Inject 12.5 mg under the skin every 7 days.       Test Results Pending At Discharge  Pending Labs       Order Current Status    Extra Urine Gray Tube Collected (02/01/25 0248)    Urinalysis with Reflex Culture and Microscopic In process            Hospital Course   Admitted for further management after a fall with increased  weakness. Found to have UTI. Treated with IV abx. Urine culture finalized with pansensitve e coli. She continued to improve daily. PT/OT worked with her during her hospitalization, she has improved significantly and plan for return to home with [facility] and therapy at home. She will continue with her house calls. Labs and VS are stable. DC home today.     Case and plan was discussed with patient, she is agreeable.   Case and plan was also discussed with my collaborating physician.     Time spent 35 minutes.     Pertinent Physical Exam At Time of Discharge  Physical Exam    Patient seen and examined, sitting up in the chair. Feels well, increasing her activity each day. Afebrile.     General: Alert and oriented x3, pleasant.   Cardiac: Regular rate and rhythm, S1/S2 , no murmur.   Pulmonary: Clear to auscultation on room air.   Abdomen: Soft, round, nontender. BS +x4.   Extremities: No edema.  Skin: No rashes or lesions.      Outpatient Follow-Up  Future Appointments   Date Time Provider Department Center   3/4/2025  2:00 PM Johanne العلي APRN-CNP [id number]         Shiloh Zaldivar APRN-CNP

## 2025-02-04 NOTE — CARE PLAN
The patient's goals for the shift include know results of urine culture and discharge home.    The clinical goals for the shift include pain management, comfort, maintains safety, encourage ambulation/OOB to chair for meals, IV antibiotics, assist with safe discharge plan, and monitor labs/VS.    Problem: Safety - Adult  Goal: Free from fall injury  Outcome: Progressing     Problem: Discharge Planning  Goal: Discharge to home or other facility with appropriate resources  Outcome: Progressing     Problem: Chronic Conditions and Co-morbidities  Goal: Patient's chronic conditions and co-morbidity symptoms are monitored and maintained or improved  Outcome: Progressing     Problem: Nutrition  Goal: Nutrient intake appropriate for maintaining nutritional needs  Outcome: Progressing     Problem: Fall/Injury  Goal: Not fall by end of shift  Outcome: Progressing  Goal: Be free from injury by end of the shift  Outcome: Progressing  Goal: Verbalize understanding of personal risk factors for fall in the hospital  Outcome: Progressing  Goal: Verbalize understanding of risk factor reduction measures to prevent injury from fall in the home  Outcome: Progressing  Goal: Use assistive devices by end of the shift  Outcome: Progressing  Goal: Pace activities to prevent fatigue by end of the shift  Outcome: Progressing     Problem: Skin  Goal: Decreased wound size/increased tissue granulation at next dressing change  Outcome: Progressing  Goal: Participates in plan/prevention/treatment measures  Outcome: Progressing  Goal: Prevent/manage excess moisture  Outcome: Progressing  Goal: Prevent/minimize sheer/friction injuries  Outcome: Progressing  Goal: Promote/optimize nutrition  Outcome: Progressing  Goal: Promote skin healing  Outcome: Progressing     Problem: Diabetes  Goal: Achieve decreasing blood glucose levels by end of shift  Outcome: Progressing  Goal: Increase stability of blood glucose readings by end of shift  Outcome:  Progressing  Goal: Decrease in ketones present in urine by end of shift  Outcome: Progressing  Goal: Maintain electrolyte levels within acceptable range throughout shift  Outcome: Progressing  Goal: Maintain glucose levels >70mg/dl to <250mg/dl throughout shift  Outcome: Progressing  Goal: No changes in neurological exam by end of shift  Outcome: Progressing  Goal: Learn about and adhere to nutrition recommendations by end of shift  Outcome: Progressing  Goal: Vital signs within normal range for age by end of shift  Outcome: Progressing  Goal: Increase self care and/or family involovement by end of shift  Outcome: Progressing  Goal: Receive DSME education by end of shift  Outcome: Progressing     Problem: Pain  Goal: Takes deep breaths with improved pain control throughout the shift  Outcome: Progressing  Goal: Turns in bed with improved pain control throughout the shift  Outcome: Progressing  Goal: Walks with improved pain control throughout the shift  Outcome: Progressing  Goal: Performs ADL's with improved pain control throughout shift  Outcome: Progressing  Goal: Participates in PT with improved pain control throughout the shift  Outcome: Progressing  Goal: Free from opioid side effects throughout the shift  Outcome: Progressing  Goal: Free from acute confusion related to pain meds throughout the shift  Outcome: Progressing     Problem: Deep Vein Thrombosis  Goal: I will remain free from complications of deep vein thrombosis and maintain current level of mobility  Outcome: Progressing

## 2025-02-04 NOTE — CARE PLAN
The patient's goals for the shift include increased mobility    The clinical goals for the shift include pain management, maintain safety, comfort, IV antibiotics, encourage OOB to chair, work with PT/OT, assist with safe discharge plan, and monitor labs/VS.    Problem: Safety - Adult  Goal: Free from fall injury  2/4/2025 1214 by Cristine Solorzano RN  Outcome: Progressing  2/4/2025 0845 by Cristine Solorzano RN  Outcome: Progressing     Problem: Discharge Planning  Goal: Discharge to home or other facility with appropriate resources  2/4/2025 1214 by Cristine Solorzano RN  Outcome: Progressing  2/4/2025 0845 by Cristine Solorzano RN  Outcome: Progressing     Problem: Chronic Conditions and Co-morbidities  Goal: Patient's chronic conditions and co-morbidity symptoms are monitored and maintained or improved  2/4/2025 1214 by Cristine Solorzano RN  Outcome: Progressing  2/4/2025 0845 by Cristine Solorzano RN  Outcome: Progressing     Problem: Nutrition  Goal: Nutrient intake appropriate for maintaining nutritional needs  2/4/2025 1214 by Cristine Solorzano RN  Outcome: Progressing  2/4/2025 0845 by Cristine Solorzano RN  Outcome: Progressing     Problem: Fall/Injury  Goal: Not fall by end of shift  2/4/2025 1214 by Cristine Solorzano RN  Outcome: Progressing  2/4/2025 0845 by Cristine Solorzano RN  Outcome: Progressing  Goal: Be free from injury by end of the shift  2/4/2025 1214 by Cristine Solorzano RN  Outcome: Progressing  2/4/2025 0845 by Cristine Solorzano RN  Outcome: Progressing  Goal: Verbalize understanding of personal risk factors for fall in the hospital  2/4/2025 1214 by Cristine Solorzano RN  Outcome: Progressing  2/4/2025 0845 by Cristine Solorzano RN  Outcome: Progressing  Goal: Verbalize understanding of risk factor reduction measures to prevent injury from fall in the home  2/4/2025 1214 by Cristine Solorzano RN  Outcome: Progressing  2/4/2025 0845 by Cristine Solorzano RN  Outcome: Progressing  Goal: Use assistive devices by end of the shift  2/4/2025 1214 by Cristine Solorzano  RN  Outcome: Progressing  2/4/2025 0845 by Cristine Solorzano RN  Outcome: Progressing  Goal: Pace activities to prevent fatigue by end of the shift  2/4/2025 1214 by Cristine Solorzano RN  Outcome: Progressing  2/4/2025 0845 by Cristine Solorzano RN  Outcome: Progressing     Problem: Skin  Goal: Decreased wound size/increased tissue granulation at next dressing change  2/4/2025 1214 by Cristine Solorzano RN  Outcome: Progressing  2/4/2025 0845 by Cristine Solorzano RN  Outcome: Progressing  Goal: Participates in plan/prevention/treatment measures  2/4/2025 1214 by Cristine Solorzano RN  Outcome: Progressing  2/4/2025 0845 by Cristine Solorzano RN  Outcome: Progressing  Goal: Prevent/manage excess moisture  2/4/2025 1214 by Cristine Solorzano RN  Outcome: Progressing  2/4/2025 0845 by Cristine Solorzano RN  Outcome: Progressing  Goal: Prevent/minimize sheer/friction injuries  2/4/2025 1214 by Cristine Solorzano RN  Outcome: Progressing  2/4/2025 0845 by Cristine Solorzano RN  Outcome: Progressing  Goal: Promote/optimize nutrition  2/4/2025 1214 by Cristine Solorzano RN  Outcome: Progressing  2/4/2025 0845 by Cristine Solorzano RN  Outcome: Progressing  Goal: Promote skin healing  2/4/2025 1214 by Cristine Solorzano RN  Outcome: Progressing  2/4/2025 0845 by Cristine Solorzano RN  Outcome: Progressing     Problem: Diabetes  Goal: Achieve decreasing blood glucose levels by end of shift  2/4/2025 1214 by Cristine Solorzano RN  Outcome: Progressing  2/4/2025 0845 by Cristine Solorzano RN  Outcome: Progressing  Goal: Increase stability of blood glucose readings by end of shift  2/4/2025 1214 by Cristine Solorzano RN  Outcome: Progressing  2/4/2025 0845 by Cristine Solorzano RN  Outcome: Progressing  Goal: Decrease in ketones present in urine by end of shift  2/4/2025 1214 by Cristine Solorzano RN  Outcome: Progressing  2/4/2025 0845 by Cristine Solorzano RN  Outcome: Progressing  Goal: Maintain electrolyte levels within acceptable range throughout shift  2/4/2025 1214 by Cristine Solorzano RN  Outcome: Progressing  2/4/2025 0845  by Cristine Solorzano, RN  Outcome: Progressing  Goal: Maintain glucose levels >70mg/dl to <250mg/dl throughout shift  2/4/2025 1214 by Cristine Solorzano RN  Outcome: Progressing  2/4/2025 0845 by Cristine Solorzano RN  Outcome: Progressing  Goal: No changes in neurological exam by end of shift  2/4/2025 1214 by Cristine Solorzano RN  Outcome: Progressing  2/4/2025 0845 by Cristine Solorzano RN  Outcome: Progressing  Goal: Learn about and adhere to nutrition recommendations by end of shift  2/4/2025 1214 by Cristine Solorzano RN  Outcome: Progressing  2/4/2025 0845 by Cristine Solorzano RN  Outcome: Progressing  Goal: Vital signs within normal range for age by end of shift  2/4/2025 1214 by Cristine Solorzano RN  Outcome: Progressing  2/4/2025 0845 by Cristine Solorzano RN  Outcome: Progressing  Goal: Increase self care and/or family involovement by end of shift  2/4/2025 1214 by Cristine Solorzano RN  Outcome: Progressing  2/4/2025 0845 by Cristine Solorzano RN  Outcome: Progressing  Goal: Receive DSME education by end of shift  2/4/2025 1214 by Cristine Solorzano RN  Outcome: Progressing  2/4/2025 0845 by Cristine Solorzano RN  Outcome: Progressing     Problem: Pain  Goal: Takes deep breaths with improved pain control throughout the shift  2/4/2025 1214 by Cristine Solorzano RN  Outcome: Progressing  2/4/2025 0845 by Cristine Solorzano RN  Outcome: Progressing  Goal: Turns in bed with improved pain control throughout the shift  2/4/2025 1214 by Cristine Solorzano RN  Outcome: Progressing  2/4/2025 0845 by Cristine Solorzano RN  Outcome: Progressing  Goal: Walks with improved pain control throughout the shift  2/4/2025 1214 by Cristine Solorzano RN  Outcome: Progressing  2/4/2025 0845 by Cristine Solorzano RN  Outcome: Progressing  Goal: Performs ADL's with improved pain control throughout shift  2/4/2025 1214 by Cristine Solorzano, RN  Outcome: Progressing  2/4/2025 0845 by Cristine Solorzano RN  Outcome: Progressing  Goal: Participates in PT with improved pain control throughout the shift  2/4/2025 1214 by Cristine  HARINI Solorzano  Outcome: Progressing  2/4/2025 0845 by Cristine Solorzano RN  Outcome: Progressing  Goal: Free from opioid side effects throughout the shift  2/4/2025 1214 by Cristine Solorzano RN  Outcome: Progressing  2/4/2025 0845 by Cristine Solorzano RN  Outcome: Progressing  Goal: Free from acute confusion related to pain meds throughout the shift  2/4/2025 1214 by Cristine Solorzano RN  Outcome: Progressing  2/4/2025 0845 by Cristine Solorzano RN  Outcome: Progressing     Problem: Deep Vein Thrombosis  Goal: I will remain free from complications of deep vein thrombosis and maintain current level of mobility  2/4/2025 1214 by Cristine Solorzano RN  Outcome: Progressing  2/4/2025 0845 by Cristine Solorzano RN  Outcome: Progressing

## 2025-02-04 NOTE — PROGRESS NOTES
02/04/25 0851   Discharge Planning   Expected Discharge Disposition Home H   Does the patient need discharge transport arranged? No     Home with [facility] at discharge.  Internal referral needed.      1335-Discharge written.  Referral to [facility]. SOC  24-48 hours

## 2025-02-04 NOTE — PROGRESS NOTES
Physical Therapy    Physical Therapy Treatment    Patient Name: Aubrie Valdivia  MRN: [id number]  Department:   Room: [facility]438-A  Today's Date: 2/4/2025  Time Calculation  Start Time: 1054  Stop Time: 1134  Time Calculation (min): 40 min         Assessment/Plan   PT Assessment  PT Assessment Results: Decreased strength, Decreased range of motion, Decreased endurance, Impaired balance, Decreased mobility, Decreased coordination, Decreased safety awareness, Obesity, Pain  Rehab Prognosis: Good  Barriers to Discharge Home: Caregiver assistance, Physical needs  Caregiver Assistance: Patient lives alone and/or does not have reliable caregiver assistance  Physical Needs: Stair navigation into home limited by function/safety, 24hr mobility assistance needed, High falls risk due to function or environment, Ambulating household distances limited by function/safety  Evaluation/Treatment Tolerance: Patient limited by fatigue, Patient limited by pain  Medical Staff Made Aware: Yes  Strengths: Premorbid level of function  Barriers to Participation: Comorbidities  End of Session Communication: Care Coordinator  Assessment Comment: Pt limited by c/o back pain. Pt fatigues easily but gives good effort. Pt was able to negotiate single step to enter home this visit (x3 trials), very effortful, pt noting difficulty due to arthritis pain right knee.  End of Session Patient Position: Up in chair, Alarm on     PT Plan  Treatment/Interventions: Transfer training, Gait training, Stair training, Balance training, Strengthening, Endurance training, Therapeutic exercise, Therapeutic activity  PT Plan: Ongoing PT  PT Frequency: 4 times per week  PT Discharge Recommendations: Moderate intensity level of continued care  Equipment Recommended upon Discharge: Wheeled walker  PT Recommended Transfer Status: Assist x1, Assistive device  PT - OK to Discharge: Yes      General Visit Information:   PT  Visit  PT Received On: 02/04/25  Response to  Previous Treatment: Patient with no complaints from previous session.  General  Reason for Referral: impaired mobility, UTI  Referred By: Dr. Diaz  Past Medical History Relevant to Rehab: HTN, ovarian cancer, OA, DM, sciatica, bariatric sx, tobacco abuse, ETOH use, MO, BALTAZAR, anxiety  Prior to Session Communication: Bedside nurse  Patient Position Received: Up in chair, Alarm on  Preferred Learning Style: verbal, visual  General Comment: Agreeable to treatment.    Subjective   Precautions:  Precautions  Hearing/Visual Limitations: glasses  Medical Precautions: Fall precautions        Objective   Pain:  Pain Assessment  Pain Assessment: 0-10  0-10 (Numeric) Pain Score: 8  Pain Type: Chronic pain  Pain Location: Back  Pain Orientation: Lower  Pain Interventions: Ambulation/increased activity (RN made aware of pain rating and request for pain meds.)  Response to Interventions: Resting quietly    Cognition:  Cognition  Overall Cognitive Status: Within Functional Limits  Orientation Level: Oriented X4  Cognition Comments: Pt pleasant and cooperative       Treatments:  Therapeutic Exercise  Therapeutic Exercise Activity 1: Pt performed seated bilat LE ankle pumps, heel raises, glute sets, LAQ, hip flexion (effortful) mo hip adduction and resisted hip abduction x15 reps each. Rest breaks as needed due to fatigue.    Ambulation/Gait Training 1  Surface 1: Level tile  Device 1: Bariatric rolling walker  Assistance 1: Contact guard, Close supervision  Quality of Gait 1: Diminished heel strike, Shuffling gait, Decreased step length, Forward flexed posture  Comments/Distance (ft) 1: Pt ambulated with RW ~20' x1 and ~55' x1 close supervision. Pt demonstrated slow, reciprocal gait. Decreased bilat step height and length. Mild forward flexed posture.    Transfer 1  Transfer From 1: Chair with arms to  Transfer to 1: Stand  Technique 1: Sit to stand  Transfer Device 1: Walker  Transfer Level of Assistance 1: Close  supervision  Trials/Comments 1: Effortful  Transfers 2  Transfer From 2: Stand to  Transfer to 2: Sit, Wheelchair  Technique 2: Stand to sit  Transfer Device 2: Walker  Transfer Level of Assistance 2: Close supervision  Transfers 3  Transfer From 3: Wheelchair to  Transfer to 3: Stand  Technique 3: Sit to stand, Stand to sit  Transfer Device 3: Walker  Transfer Level of Assistance 3: Close supervision  Trials/Comments 3: x2 STS from w/c  Transfers 4  Transfer From 4: Stand to  Transfer to 4: Sit, Chair with arms  Technique 4: Stand to sit  Transfer Device 4: Walker  Transfer Level of Assistance 4: Close supervision    Stairs  Rails 1: Bilateral  Curb Step 1: No  Device 1: Railing  Assistance 1: Minimum assistance, Contact guard  Comment/Number of Steps 1: Up/down one step x3 trials with bilat rails, min assist of 1 and CGA of 1. Verbal cues for sequencing. Very effortful for pt.    Outcome Measures:  [facility] Basic Mobility  Turning from your back to your side while in a flat bed without using bedrails: None  Moving from lying on your back to sitting on the side of a flat bed without using bedrails: A little  Moving to and from bed to chair (including a wheelchair): A little  Standing up from a chair using your arms (e.g. wheelchair or bedside chair): A little  To walk in hospital room: A little  Climbing 3-5 steps with railing: A lot  Basic Mobility - Total Score: 18    Education Documentation  Precautions, taught by Lindsay Robledo PTA at 2/4/2025  2:47 PM.  Learner: Patient  Readiness: Acceptance  Method: Explanation  Response: Verbalizes Understanding  Comment: Safety with transfers, ambulation and stairs.    Home Exercise Program, taught by Lindsay Robledo PTA at 2/4/2025  2:47 PM.  Learner: Patient  Readiness: Acceptance  Method: Explanation  Response: Verbalizes Understanding  Comment: Safety with transfers, ambulation and stairs.    Mobility Training, taught by Lindsay Robledo PTA at 2/4/2025  2:47 PM.  Learner:  Patient  Readiness: Acceptance  Method: Explanation  Response: Verbalizes Understanding  Comment: Safety with transfers, ambulation and stairs.    Education Comments  No comments found.        OP EDUCATION:       Encounter Problems       Encounter Problems (Active)       Mobility       STG - Patient will ambulate 45' w/ min assist and RW, w/o LOB (Progressing)       Start:  02/01/25    Expected End:  02/07/25            STG - Patient will ascend and descend four to six stairs w/ one rail ,cane and min assist (Progressing)       Start:  02/01/25    Expected End:  02/07/25            t will tolerate BLE exercises at 15 reps or > consistently to promote functional strength, endurance, balance and safe mobility  (Progressing)       Start:  02/01/25    Expected End:  02/07/25               PT Transfers       STG - Patient to transfer to and from sit to supine w/ distant supervision and no LOB (Progressing)       Start:  02/01/25    Expected End:  02/07/25            STG - Patient will transfer sit to and from stand w/ CGA, proper technique and no LOB (Progressing)       Start:  02/01/25    Expected End:  02/07/25               Safety       LTG - Patient will utilize safety techniques w/ functional mobility (Progressing)       Start:  02/01/25    Expected End:  02/07/25

## 2025-02-05 NOTE — TELEPHONE ENCOUNTER
MORIS Whiting - Patient discharged home from [facility] 2/4/25 with [facility].  Per DC summary: Found to have UTI. Treated with IV abx. Urine culture finalized with pansensitve e coli. She continued to improve daily. PT/OT worked with her during her hospitalization, she has improved significantly and plan for return to home with [facility] and therapy at home. She will continue with her house calls. Labs and VS are stable. DC home today.    Phoned patient in follow up, she has a visit scheduled with you on 3/4/25 and prefers to maintain that visit. She reported she does not feel a sooner visit is necessary. She is in agreement to contact the [facility] office as needed in the interim.

## 2025-02-10 ENCOUNTER — TELEPHONE (OUTPATIENT)
Dept: INPATIENT UNIT | Facility: HOSPITAL | Age: 70
End: 2025-02-10
Payer: MEDICARE

## 2025-02-14 DIAGNOSIS — I10 PRIMARY HYPERTENSION: ICD-10-CM

## 2025-02-14 RX ORDER — AMLODIPINE BESYLATE 10 MG/1
10 TABLET ORAL DAILY
Qty: 28 TABLET | Refills: 10 | Status: SHIPPED | OUTPATIENT
Start: 2025-02-14

## 2025-02-17 ENCOUNTER — HOME CARE VISIT (OUTPATIENT)
Dept: HOME HEALTH SERVICES | Facility: HOME HEALTH | Age: 70
End: 2025-02-17
Payer: MEDICARE

## 2025-02-17 PROCEDURE — G0152 HHCP-SERV OF OT,EA 15 MIN: HCPCS | Mod: HHH

## 2025-02-17 PROCEDURE — 169592 NO-PAY CLAIM PROCEDURE

## 2025-02-17 ASSESSMENT — ENCOUNTER SYMPTOMS
PAIN LOCATION - PAIN SEVERITY: 8/10
PERSON REPORTING PAIN: PATIENT
SUBJECTIVE PAIN PROGRESSION: UNCHANGED
PAIN LOCATION - PAIN QUALITY: ACHING
DEPRESSION: 0
LOSS OF SENSATION IN FEET: 1
LOWEST PAIN SEVERITY IN PAST 24 HOURS: 8/10
PAIN LOCATION: BACK
PAIN LOCATION - PAIN FREQUENCY: CONSTANT
PAIN SEVERITY GOAL: 4/10
OCCASIONAL FEELINGS OF UNSTEADINESS: 1
PAIN: 1
HIGHEST PAIN SEVERITY IN PAST 24 HOURS: 8/10

## 2025-02-17 ASSESSMENT — ACTIVITIES OF DAILY LIVING (ADL)
OASIS_M1830: 03
ENTERING_EXITING_HOME: MINIMUM ASSIST

## 2025-02-20 ENCOUNTER — HOME CARE VISIT (OUTPATIENT)
Dept: HOME HEALTH SERVICES | Facility: HOME HEALTH | Age: 70
End: 2025-02-20
Payer: MEDICARE

## 2025-02-20 PROCEDURE — G0152 HHCP-SERV OF OT,EA 15 MIN: HCPCS | Mod: HHH

## 2025-02-20 ASSESSMENT — ENCOUNTER SYMPTOMS
PAIN LOCATION - PAIN SEVERITY: 4/10
PAIN: 1
LOWEST PAIN SEVERITY IN PAST 24 HOURS: 4/10
PERSON REPORTING PAIN: PATIENT
PAIN LOCATION - PAIN QUALITY: ACHING
HIGHEST PAIN SEVERITY IN PAST 24 HOURS: 4/10
PAIN LOCATION: HEAD
SUBJECTIVE PAIN PROGRESSION: UNCHANGED
PAIN SEVERITY GOAL: 0/10
PAIN LOCATION - PAIN FREQUENCY: INTERMITTENT

## 2025-02-21 ENCOUNTER — HOME CARE VISIT (OUTPATIENT)
Dept: HOME HEALTH SERVICES | Facility: HOME HEALTH | Age: 70
End: 2025-02-21
Payer: MEDICARE

## 2025-02-21 VITALS — DIASTOLIC BLOOD PRESSURE: 72 MMHG | SYSTOLIC BLOOD PRESSURE: 102 MMHG | OXYGEN SATURATION: 99 % | HEART RATE: 85 BPM

## 2025-02-21 PROCEDURE — G0151 HHCP-SERV OF PT,EA 15 MIN: HCPCS | Mod: HHH | Performed by: PHYSICAL THERAPIST

## 2025-02-21 ASSESSMENT — ENCOUNTER SYMPTOMS
PERSON REPORTING PAIN: PATIENT
PAIN LOCATION: BACK
PAIN LOCATION - PAIN SEVERITY: 8/10
MUSCLE WEAKNESS: 1

## 2025-02-24 ENCOUNTER — HOME CARE VISIT (OUTPATIENT)
Dept: HOME HEALTH SERVICES | Facility: HOME HEALTH | Age: 70
End: 2025-02-24
Payer: MEDICARE

## 2025-02-24 PROCEDURE — G0152 HHCP-SERV OF OT,EA 15 MIN: HCPCS | Mod: HHH

## 2025-02-24 ASSESSMENT — ENCOUNTER SYMPTOMS
LOWEST PAIN SEVERITY IN PAST 24 HOURS: 6/10
PAIN LOCATION - PAIN FREQUENCY: INTERMITTENT
PAIN LOCATION: BACK
PERSON REPORTING PAIN: PATIENT
SUBJECTIVE PAIN PROGRESSION: UNCHANGED
PAIN SEVERITY GOAL: 0/10
PAIN: 1
PAIN LOCATION - PAIN SEVERITY: 6/10
PAIN LOCATION - PAIN QUALITY: ACHING
HIGHEST PAIN SEVERITY IN PAST 24 HOURS: 6/10

## 2025-02-27 ENCOUNTER — HOME CARE VISIT (OUTPATIENT)
Dept: HOME HEALTH SERVICES | Facility: HOME HEALTH | Age: 70
End: 2025-02-27
Payer: MEDICARE

## 2025-02-27 PROCEDURE — G0152 HHCP-SERV OF OT,EA 15 MIN: HCPCS | Mod: HHH

## 2025-02-27 ASSESSMENT — ENCOUNTER SYMPTOMS
PERSON REPORTING PAIN: PATIENT
PAIN LOCATION: BACK
PAIN LOCATION - PAIN SEVERITY: 4/10
PAIN SEVERITY GOAL: 0/10
HIGHEST PAIN SEVERITY IN PAST 24 HOURS: 4/10
PAIN LOCATION - PAIN FREQUENCY: INTERMITTENT
LOWEST PAIN SEVERITY IN PAST 24 HOURS: 4/10
PAIN LOCATION - PAIN QUALITY: ACHING
PAIN: 1

## 2025-02-27 ASSESSMENT — ACTIVITIES OF DAILY LIVING (ADL)
PHYSICAL TRANSFERS ASSESSED: 1
CURRENT_FUNCTION: SUPERVISION

## 2025-02-28 ENCOUNTER — HOME CARE VISIT (OUTPATIENT)
Dept: HOME HEALTH SERVICES | Facility: HOME HEALTH | Age: 70
End: 2025-02-28
Payer: MEDICARE

## 2025-02-28 VITALS — HEART RATE: 102 BPM | OXYGEN SATURATION: 98 % | TEMPERATURE: 96.2 F

## 2025-02-28 PROCEDURE — G0151 HHCP-SERV OF PT,EA 15 MIN: HCPCS | Mod: HHH | Performed by: PHYSICAL THERAPIST

## 2025-02-28 ASSESSMENT — ENCOUNTER SYMPTOMS
PERSON REPORTING PAIN: PATIENT
DENIES PAIN: 1

## 2025-03-03 ENCOUNTER — TELEPHONE (OUTPATIENT)
Dept: PRIMARY CARE | Facility: CLINIC | Age: 70
End: 2025-03-03
Payer: MEDICARE

## 2025-03-03 NOTE — TELEPHONE ENCOUNTER
Call placed to patient number as listed, no answer, left message requesting return call along with office telephone number.

## 2025-03-04 ENCOUNTER — OFFICE VISIT (OUTPATIENT)
Dept: PRIMARY CARE | Facility: CLINIC | Age: 70
End: 2025-03-04
Payer: MEDICARE

## 2025-03-04 VITALS
HEIGHT: 65 IN | HEART RATE: 64 BPM | TEMPERATURE: 96.8 F | WEIGHT: 280 LBS | SYSTOLIC BLOOD PRESSURE: 127 MMHG | RESPIRATION RATE: 16 BRPM | OXYGEN SATURATION: 98 % | BODY MASS INDEX: 46.65 KG/M2 | DIASTOLIC BLOOD PRESSURE: 77 MMHG

## 2025-03-04 DIAGNOSIS — I10 HTN (HYPERTENSION), BENIGN: Primary | ICD-10-CM

## 2025-03-04 DIAGNOSIS — E11.69 TYPE 2 DIABETES MELLITUS WITH OTHER SPECIFIED COMPLICATION, WITH LONG-TERM CURRENT USE OF INSULIN: ICD-10-CM

## 2025-03-04 DIAGNOSIS — E66.01 MORBID OBESITY WITH BMI OF 45.0-49.9, ADULT (MULTI): ICD-10-CM

## 2025-03-04 DIAGNOSIS — M48.061 DEGENERATIVE LUMBAR SPINAL STENOSIS: Chronic | ICD-10-CM

## 2025-03-04 DIAGNOSIS — M54.17 LUMBOSACRAL RADICULITIS: ICD-10-CM

## 2025-03-04 DIAGNOSIS — Z79.4 TYPE 2 DIABETES MELLITUS WITH OTHER SPECIFIED COMPLICATION, WITH LONG-TERM CURRENT USE OF INSULIN: ICD-10-CM

## 2025-03-04 PROCEDURE — 1111F DSCHRG MED/CURRENT MED MERGE: CPT | Performed by: NURSE PRACTITIONER

## 2025-03-04 PROCEDURE — 1159F MED LIST DOCD IN RCRD: CPT | Performed by: NURSE PRACTITIONER

## 2025-03-04 PROCEDURE — 99349 HOME/RES VST EST MOD MDM 40: CPT | Performed by: NURSE PRACTITIONER

## 2025-03-04 PROCEDURE — 1160F RVW MEDS BY RX/DR IN RCRD: CPT | Performed by: NURSE PRACTITIONER

## 2025-03-04 PROCEDURE — 3008F BODY MASS INDEX DOCD: CPT | Performed by: NURSE PRACTITIONER

## 2025-03-04 PROCEDURE — 3078F DIAST BP <80 MM HG: CPT | Performed by: NURSE PRACTITIONER

## 2025-03-04 PROCEDURE — 3074F SYST BP LT 130 MM HG: CPT | Performed by: NURSE PRACTITIONER

## 2025-03-04 PROCEDURE — 4010F ACE/ARB THERAPY RXD/TAKEN: CPT | Performed by: NURSE PRACTITIONER

## 2025-03-04 RX ORDER — ACETAMINOPHEN 500 MG
5000 TABLET ORAL DAILY
COMMUNITY

## 2025-03-04 NOTE — PROGRESS NOTES
"Subjective   Patient ID: Aubrie Valdivia is a 69 y.o. female who presents for Follow-up (Follow up for [facility] patient for multiple chronic issues including DMII, HTN, severe radiculitis).    This is a 69 year old female seen today in her private residence. She is awake and alert with appropriate awareness and is ambulatory with her walker. PMH: HTN, Depression, anxiety, DMII, BALTAZAR, Severe osteoarthritis, morbid obesity, lumbosacral radiculitis, neuropathy, Cervical radiculopathy, ovarian CA 2010.  Home bound due to severe osteoarthritis, limited support and difficult getting out of the home.     Today is a follow up for HTN, DMII, BALTAZAR, obesity.  Today she reports that since her ER visit 2/1 she is paranoid about getting another UTI.  Admits that she has never had those symptoms before and \"it really freaked me out\".  Admits she never new how weak someone could be with a UTI.  Reports that she is urinating adequate amounts and denies changes or notable strong urine.  She is hydrating, endorsing drinking \"plenty of fluids\".  States that she continues working on her weight, lifestyle changes and monitoring her caloric intake.  She has not restarted the ozempic as she is waiting on approval.  Attempts to get tirzepatide approved, however was denied.  Reports being extremely discourage as she has only lost 1 lb in 3 months.  Reports that she is monitoring her daily intake and monitors all that she eat, and her weight is not budging.  Appears to visibly have lost weight, however is depressed and discouraged.  Current weight is at 280.6 lbs.  DMII - well managed with ozempic at this time. BALTAZAR - sleeps with head elevated and in reclining position.  Continues with chronic back pain, was getting back injections, however felt that the pain of the injection do not outweigh the results.  Reports that she has arthretic pain all throughout her body.  States that she has had a difficult time this winter and is now having a " difficult time with her hands.  In agreement to see Rheumatology, may rule out RA.  Independent with all ADL's, recently had bathroom remodeled for easy access with safety measures.  Bowels reported as regular, urinating adequate amounts and is continent of bowel and bladder.  Orders all groceries via [employer] for home delivery.      Denies chest pain, palpitations, tachycardia, and shortness of breath, wheezing, no PND/orthopnea, or respiratory complaints for the patient. There is no fever, or chills. No nausea, vomiting, diarrhea, headaches, or vision changes or recent falls. There is no hematuria, dysuria, flank pain, or increased urgency.  Home Visit: Medically necessary due to: dementia/cognitive impairment, unsteady gait/poor balance, shortness of breath with minimal exertion, Illness or condition that results in activity limitation or restriction that impacts the ability to leave home such as: equipment and /or human assistance needed to safely leave the home, patient has limited support systems to help the patient attend office visits, the office visit would require excessive physical effort or pain for the patient.              Current Outpatient Medications:     amLODIPine (Norvasc) 10 mg tablet, 1 TAB BY MOUTH ONCE A DAY, Disp: 28 tablet, Rfl: 10    docusate sodium (Colace) 100 mg capsule, Take 1 capsule (100 mg) by mouth 2 times a day., Disp: , Rfl:     DULoxetine (Cymbalta) 60 mg DR capsule, 1 CAP BY MOUTH ONCE A DAY, Disp: 28 capsule, Rfl: 11    fluticasone (Flonase) 50 mcg/actuation nasal spray, 1 SPRAY EACH NOSTRIL ONCE A DAY, Disp: 16 g, Rfl: 11    lisinopril 10 mg tablet, 1 TAB BY MOUTH ONCE A DAY, Disp: 30 tablet, Rfl: 11    loratadine (Claritin) 10 mg tablet, 1 TAB BY MOUTH ONCE A DAY, Disp: 28 tablet, Rfl: 11    meloxicam (Mobic) 15 mg tablet, 1 TAB BY MOUTH EVERY MORNING, Disp: 28 tablet, Rfl: 11    multivit-min/iron/FA/vit K/lut (MULTIVITAMIN WOMEN 50 PLUS ORAL), Take 1 tablet by mouth once  "every 24 hours., Disp: , Rfl:     pregabalin (Lyrica) 100 mg capsule, Take 1 capsule (100 mg) by mouth 2 times a day., Disp: 180 capsule, Rfl: 3    semaglutide, weight loss, 2.4 mg/0.75 mL pen injector, Inject 2.4 mg under the skin 1 (one) time per week., Disp: 8 mL, Rfl: 3    sertraline (Zoloft) 100 mg tablet, 1 TAB BY MOUTH ONCE A DAY, Disp: 28 tablet, Rfl: 11    traZODone (Desyrel) 100 mg tablet, 1 TAB BY MOUTH DAILY AT BEDTIME, Disp: 28 tablet, Rfl: 11    cefdinir (Omnicef) 300 mg capsule, Take 1 capsule (300 mg) by mouth 2 times a day for 5 days., Disp: 10 capsule, Rfl: 0    cholecalciferol (Vitamin D3) 5,000 Units tablet, Take 1 tablet (5,000 Units) by mouth once daily., Disp: , Rfl:     nystatin (Mycostatin) cream, APPLY 1 APPLCIATION TOPICALLY TWICE A DAY (Patient taking differently: APPLY 1 APPLCIATION TOPICALLY TWICE A DAY,  UNDER ABDOMINAL SKIN FOLDS ), Disp: 60 g, Rfl: 10     Review of Systems  CONSTITUTIONAL denies, fever, chills, night sweats, unexplained weight loss. CARDIOLOGY Denies, palpitations, exertional chest pain, shortness of breath. RESPIRATORY Negative for, persistent cough, hemoptysis, wheezing. GI Positive for, constipation, diarrhea. UROLOGY Denies voiding symptoms. NEUROLOGY Positive for numbness in her hands and now L foot. MUSCULOSKELETAL Positive for BLE weakness, and pain.      Objective   /77 (BP Location: Left arm, Patient Position: Sitting, BP Cuff Size: Adult)   Pulse 64   Temp 36 °C (96.8 °F) (Temporal)   Resp 16   Ht 1.651 m (5' 5\")   Wt 127 kg (280 lb)   SpO2 98%   BMI 46.59 kg/m²     Physical Exam  CONSTITUTIONAL denies, fever, chills, night sweats, unexplained weight loss. CARDIOLOGY Denies, palpitations, exertional chest pain, shortness of breath. RESPIRATORY Negative for, persistent cough, hemoptysis, wheezing. GI Positive for, constipation, diarrhea. UROLOGY Denies voiding symptoms. NEUROLOGY Positive for numbness in her hands and now L foot. MUSCULOSKELETAL " Positive for BLE weakness, and pain.      Assessment/Plan   Diagnoses and all orders for this visit:  HTN (hypertension), benign  Comments:  Managed - cont amlodipine 10mg daily  Degenerative lumbar spinal stenosis  Comments:  Cont with supportive care - meloxiam 15mg daily, lyrica 100mg daily,  Type 2 diabetes mellitus with other specified complication, with long-term current use of insulin  Comments:  Increase ozempic to 2.5mg/0.5mL or noted as 50 units  Orders:  -     semaglutide, weight loss, 2.4 mg/0.75 mL pen injector; Inject 2.4 mg under the skin 1 (one) time per week.  Morbid obesity with BMI of 45.0-49.9, adult (Multi)  Lumbosacral radiculitis    More than 50% of time spent in face-to-face discussion @ a total of 60 minutes with this patient on counseling, coordination of care, and review of medical records and diagnostics. Advised pt to contact house calls office with any acute concerns or medication needs.     Labs ordered through [facility]; CBC, BMP, lipid panel, vit D and iron panel    Recommending collagen protein powder along with multivitamin supplements             Johanne العلي, APRN-CNP

## 2025-03-05 ENCOUNTER — HOME CARE VISIT (OUTPATIENT)
Dept: HOME HEALTH SERVICES | Facility: HOME HEALTH | Age: 70
End: 2025-03-05
Payer: MEDICARE

## 2025-03-05 PROCEDURE — G0152 HHCP-SERV OF OT,EA 15 MIN: HCPCS | Mod: HHH

## 2025-03-05 ASSESSMENT — ENCOUNTER SYMPTOMS
PAIN SEVERITY GOAL: 2/10
HIGHEST PAIN SEVERITY IN PAST 24 HOURS: 8/10
PERSON REPORTING PAIN: PATIENT
SUBJECTIVE PAIN PROGRESSION: UNCHANGED
PAIN: 1
LOWEST PAIN SEVERITY IN PAST 24 HOURS: 8/10

## 2025-03-05 ASSESSMENT — ACTIVITIES OF DAILY LIVING (ADL)
CURRENT_FUNCTION: SUPERVISION
PHYSICAL TRANSFERS ASSESSED: 1

## 2025-03-06 ENCOUNTER — HOME CARE VISIT (OUTPATIENT)
Dept: HOME HEALTH SERVICES | Facility: HOME HEALTH | Age: 70
End: 2025-03-06
Payer: MEDICARE

## 2025-03-06 ENCOUNTER — TELEPHONE (OUTPATIENT)
Dept: PRIMARY CARE | Facility: CLINIC | Age: 70
End: 2025-03-06
Payer: MEDICARE

## 2025-03-06 DIAGNOSIS — N30.00 ACUTE CYSTITIS WITHOUT HEMATURIA: Primary | ICD-10-CM

## 2025-03-06 PROCEDURE — G0152 HHCP-SERV OF OT,EA 15 MIN: HCPCS | Mod: HHH

## 2025-03-06 RX ORDER — CEFDINIR 300 MG/1
300 CAPSULE ORAL 2 TIMES DAILY
Qty: 10 CAPSULE | Refills: 0 | Status: SHIPPED | OUTPATIENT
Start: 2025-03-06 | End: 2025-03-11

## 2025-03-06 ASSESSMENT — ACTIVITIES OF DAILY LIVING (ADL)
CURRENT_FUNCTION: SUPERVISION
PHYSICAL TRANSFERS ASSESSED: 1
HOUSEKEEPING ASSESSED: 1
LIGHT HOUSEKEEPING: NEEDS ASSISTANCE

## 2025-03-06 ASSESSMENT — ENCOUNTER SYMPTOMS
LOWEST PAIN SEVERITY IN PAST 24 HOURS: 3/10
HIGHEST PAIN SEVERITY IN PAST 24 HOURS: 3/10
PAIN SEVERITY GOAL: 0/10
PAIN: 1
PAIN LOCATION - PAIN QUALITY: ACHING
PAIN LOCATION: GENERALIZED
PAIN LOCATION - PAIN SEVERITY: 3/10
PAIN LOCATION - PAIN FREQUENCY: INTERMITTENT
SUBJECTIVE PAIN PROGRESSION: UNCHANGED
PERSON REPORTING PAIN: PATIENT

## 2025-03-06 NOTE — TELEPHONE ENCOUNTER
Patient called reporting concern that she has another UTI. Stated she was recently hospitalized (last month) for UTI and completed 5 days of Cefuroxime. She notice a very strong ammonia odor to her urine today.  Please advise.

## 2025-03-06 NOTE — CASE COMMUNICATION
good morning. can you please send OT note from 3/5 and PCP from 3/4 to [email or web address]    Thanks!

## 2025-03-07 ENCOUNTER — HOME CARE VISIT (OUTPATIENT)
Dept: HOME HEALTH SERVICES | Facility: HOME HEALTH | Age: 70
End: 2025-03-07
Payer: MEDICARE

## 2025-03-15 DIAGNOSIS — L30.9 DERMATITIS: ICD-10-CM

## 2025-03-17 RX ORDER — TRIAMCINOLONE ACETONIDE 1 MG/G
CREAM TOPICAL
Qty: 30 G | Refills: 10 | Status: SHIPPED | OUTPATIENT
Start: 2025-03-17

## 2025-03-21 ENCOUNTER — TELEPHONE (OUTPATIENT)
Dept: PRIMARY CARE | Facility: CLINIC | Age: 70
End: 2025-03-21
Payer: MEDICARE

## 2025-03-21 NOTE — TELEPHONE ENCOUNTER
Recent labs drawn by [facility]. Johanne العلي CNP received results and noted that labs look good and no changes. Left detailed message for pt advised of results and mailed copy of labs. Labs scanned into pt's media.

## 2025-03-25 ENCOUNTER — TELEPHONE (OUTPATIENT)
Dept: PRIMARY CARE | Facility: CLINIC | Age: 70
End: 2025-03-25
Payer: MEDICARE

## 2025-03-25 DIAGNOSIS — N30.00 ACUTE CYSTITIS WITHOUT HEMATURIA: Primary | ICD-10-CM

## 2025-03-25 RX ORDER — NITROFURANTOIN 25; 75 MG/1; MG/1
100 CAPSULE ORAL 2 TIMES DAILY
Qty: 14 CAPSULE | Refills: 0 | Status: SHIPPED | OUTPATIENT
Start: 2025-03-25 | End: 2025-04-01

## 2025-03-25 NOTE — TELEPHONE ENCOUNTER
Patient calls with reported burning with urination, frequency and increased fatigue.  She was hospitalized recently with similar symptoms and treated for UTI.  In agreement to order urine c& s and start on atb.  Macrobid sent to [facility].

## 2025-03-25 NOTE — TELEPHONE ENCOUNTER
"Patient called office this afternoon stating she believes her UTI is not resolved. She is not feeling well. Has lethargy, strong odor to urine, and has urgency. Did a home \"strip test\" that turned purple. She completed the recent antibiotic 4 days ago.  "

## 2025-03-27 NOTE — TELEPHONE ENCOUNTER
Spoke with patient. Aware additional antibiotic order has been sent. Stated she will received it today.

## 2025-04-11 DIAGNOSIS — M54.17 LUMBOSACRAL RADICULITIS: ICD-10-CM

## 2025-04-15 ENCOUNTER — TELEPHONE (OUTPATIENT)
Dept: PRIMARY CARE | Facility: CLINIC | Age: 70
End: 2025-04-15
Payer: MEDICARE

## 2025-04-15 RX ORDER — PREGABALIN 100 MG/1
CAPSULE ORAL
Qty: 56 CAPSULE | Refills: 5 | Status: SHIPPED | OUTPATIENT
Start: 2025-04-15

## 2025-04-15 NOTE — TELEPHONE ENCOUNTER
"MORIS Whiting - Confirmed 4/16/25 [facility] visit with patient. She reported her last antibiotic for a UTI was 8 days ago and that she tested positive for a UTI today via a strip test. When questioned on symptoms she denied burning or frequency. She reported chronic pain, no acute changes noted. She reported with the last UTI her legs were \"wobbly\" and this continues at intervals. She reported she \"felt like she had a temperature yesterday\".   "

## 2025-04-16 ENCOUNTER — OFFICE VISIT (OUTPATIENT)
Dept: PRIMARY CARE | Facility: CLINIC | Age: 70
End: 2025-04-16
Payer: MEDICARE

## 2025-04-16 VITALS
BODY MASS INDEX: 44.82 KG/M2 | DIASTOLIC BLOOD PRESSURE: 71 MMHG | WEIGHT: 269 LBS | HEIGHT: 65 IN | OXYGEN SATURATION: 98 % | SYSTOLIC BLOOD PRESSURE: 125 MMHG | RESPIRATION RATE: 16 BRPM | HEART RATE: 76 BPM | TEMPERATURE: 96.8 F

## 2025-04-16 DIAGNOSIS — E11.9 TYPE 2 DIABETES MELLITUS WITHOUT COMPLICATION, WITHOUT LONG-TERM CURRENT USE OF INSULIN: ICD-10-CM

## 2025-04-16 DIAGNOSIS — N30.00 ACUTE CYSTITIS WITHOUT HEMATURIA: ICD-10-CM

## 2025-04-16 DIAGNOSIS — N30.00 ACUTE CYSTITIS WITHOUT HEMATURIA: Primary | ICD-10-CM

## 2025-04-16 DIAGNOSIS — I10 HTN (HYPERTENSION), BENIGN: Primary | ICD-10-CM

## 2025-04-16 DIAGNOSIS — I10 HTN (HYPERTENSION), BENIGN: ICD-10-CM

## 2025-04-16 DIAGNOSIS — M15.0 PRIMARY OSTEOARTHRITIS INVOLVING MULTIPLE JOINTS: Chronic | ICD-10-CM

## 2025-04-16 DIAGNOSIS — B36.9 FUNGAL RASH OF TORSO: ICD-10-CM

## 2025-04-16 DIAGNOSIS — E66.01 MORBID OBESITY WITH BMI OF 45.0-49.9, ADULT (MULTI): Chronic | ICD-10-CM

## 2025-04-16 PROCEDURE — 1160F RVW MEDS BY RX/DR IN RCRD: CPT | Performed by: NURSE PRACTITIONER

## 2025-04-16 PROCEDURE — 3078F DIAST BP <80 MM HG: CPT | Performed by: NURSE PRACTITIONER

## 2025-04-16 PROCEDURE — 3074F SYST BP LT 130 MM HG: CPT | Performed by: NURSE PRACTITIONER

## 2025-04-16 PROCEDURE — 4010F ACE/ARB THERAPY RXD/TAKEN: CPT | Performed by: NURSE PRACTITIONER

## 2025-04-16 PROCEDURE — 99349 HOME/RES VST EST MOD MDM 40: CPT | Performed by: NURSE PRACTITIONER

## 2025-04-16 PROCEDURE — 1159F MED LIST DOCD IN RCRD: CPT | Performed by: NURSE PRACTITIONER

## 2025-04-16 RX ORDER — NITROFURANTOIN 25; 75 MG/1; MG/1
100 CAPSULE ORAL 2 TIMES DAILY
Qty: 14 CAPSULE | Refills: 0 | Status: SHIPPED | OUTPATIENT
Start: 2025-04-16 | End: 2025-04-23

## 2025-04-16 NOTE — PROGRESS NOTES
Subjective   Patient ID: Aubrie Valdivia is a 69 y.o. female who presents for No chief complaint on file..    This is a 69 year old female seen today in her private residence. She is awake and alert with appropriate awareness and is ambulatory with her walker. PMH: HTN, Depression, anxiety, DMII, BALTAZAR, Severe osteoarthritis, morbid obesity, lumbosacral radiculitis, neuropathy, Cervical radiculopathy, ovarian CA 2010.  Home bound due to severe osteoarthritis, limited support and difficult getting out of the home.     Today is a follow up for multiple chronic issues HTN, DMII, BALTAZAR, obesity. Today she is concerned due to UTI which is recurring and does not feel it has actually gone away.  Reports increased fatigue, with dark cloudy urine.  Reports that last antibiotic she felt that she was getting better, however now feels like it has returned and symptoms returned.  Urine dip is positive, however unable to obtain actual culture due to dynamics.         Current Medications[1]   •  amLODIPine (Norvasc) 10 mg tablet, 1 TAB BY MOUTH ONCE A DAY, Disp: 28 tablet, Rfl: 10  •  docusate sodium (Colace) 100 mg capsule, Take 1 capsule (100 mg) by mouth 2 times a day., Disp: , Rfl:   •  DULoxetine (Cymbalta) 60 mg DR capsule, 1 CAP BY MOUTH ONCE A DAY, Disp: 28 capsule, Rfl: 11  •  fluticasone (Flonase) 50 mcg/actuation nasal spray, 1 SPRAY EACH NOSTRIL ONCE A DAY, Disp: 16 g, Rfl: 11  •  lisinopril 10 mg tablet, 1 TAB BY MOUTH ONCE A DAY, Disp: 30 tablet, Rfl: 11  •  loratadine (Claritin) 10 mg tablet, 1 TAB BY MOUTH ONCE A DAY, Disp: 28 tablet, Rfl: 11  •  meloxicam (Mobic) 15 mg tablet, 1 TAB BY MOUTH EVERY MORNING, Disp: 28 tablet, Rfl: 11  •  multivit-min/iron/FA/vit K/lut (MULTIVITAMIN WOMEN 50 PLUS ORAL), Take 1 tablet by mouth once every 24 hours., Disp: , Rfl:   •  pregabalin (Lyrica) 100 mg capsule, Take 1 capsule (100 mg) by mouth 2 times a day., Disp: 180 capsule, Rfl: 3  •  semaglutide, weight loss, 2.4 mg/0.75 mL  "pen injector, Inject 2.4 mg under the skin 1 (one) time per week., Disp: 8 mL, Rfl: 3  •  sertraline (Zoloft) 100 mg tablet, 1 TAB BY MOUTH ONCE A DAY, Disp: 28 tablet, Rfl: 11  •  traZODone (Desyrel) 100 mg tablet, 1 TAB BY MOUTH DAILY AT BEDTIME, Disp: 28 tablet, Rfl: 11  •  cefdinir (Omnicef) 300 mg capsule, Take 1 capsule (300 mg) by mouth 2 times a day for 5 days., Disp: 10 capsule, Rfl: 0  •  cholecalciferol (Vitamin D3) 5,000 Units tablet, Take 1 tablet (5,000 Units) by mouth once daily., Disp: , Rfl:   •  nystatin (Mycostatin) cream, APPLY 1 APPLCIATION TOPICALLY TWICE A DAY (Patient taking differently: APPLY 1 APPLCIATION TOPICALLY TWICE A DAY,  UNDER ABDOMINAL SKIN FOLDS ), Disp: 60 g, Rfl: 10     Review of Systems  CONSTITUTIONAL denies, fever, chills, night sweats, unexplained weight loss. CARDIOLOGY Denies, palpitations, exertional chest pain, shortness of breath. RESPIRATORY Negative for, persistent cough, hemoptysis, wheezing. GI Positive for, constipation, diarrhea. UROLOGY Denies voiding symptoms. NEUROLOGY Positive for numbness in her hands and now L foot. MUSCULOSKELETAL Positive for BLE weakness, and pain.     Objective   /71 (BP Location: Left arm, Patient Position: Sitting, BP Cuff Size: Adult)   Pulse 76   Temp 36 °C (96.8 °F) (Temporal)   Resp 16   Ht 1.651 m (5' 5\")   Wt 122 kg (269 lb)   SpO2 98%   BMI 44.76 kg/m²     Physical Exam  Constitutional:       General: She is not in acute distress.     Appearance: Normal appearance. She is obese.   HENT:      Head: Normocephalic and atraumatic.      Right Ear: External ear normal.      Left Ear: External ear normal.      Nose: Nose normal.      Mouth/Throat:      Mouth: Mucous membranes are moist.      Pharynx: Oropharynx is clear.   Eyes:      Extraocular Movements: Extraocular movements intact.      Pupils: Pupils are equal, round, and reactive to light.   Cardiovascular:      Rate and Rhythm: Normal rate and regular rhythm.      " Pulses: Normal pulses.      Heart sounds: Normal heart sounds. No murmur heard.  Pulmonary:      Effort: Pulmonary effort is normal. No respiratory distress.      Breath sounds: Normal breath sounds. No wheezing.   Abdominal:      General: Bowel sounds are normal. There is no distension.      Palpations: Abdomen is soft.      Tenderness: There is no abdominal tenderness.   Musculoskeletal:      Cervical back: Normal range of motion and neck supple. No tenderness.      Right lower leg: Edema present.      Left lower leg: Edema present.   Skin:     General: Skin is warm and dry.      Capillary Refill: Capillary refill takes 2 to 3 seconds.      Findings: No bruising.      Comments: Fungal rash bilateral lower breast   Neurological:      Mental Status: She is alert and oriented to person, place, and time.      Motor: Weakness present.      Gait: Gait abnormal.   Psychiatric:         Mood and Affect: Mood normal.         Assessment/Plan   Diagnoses and all orders for this visit:  Acute cystitis without hematuria  -     nitrofurantoin, macrocrystal-monohydrate, (Macrobid) 100 mg capsule; Take 1 capsule (100 mg) by mouth 2 times a day for 7 days.  HTN (hypertension), benign  Morbid obesity with BMI of 45.0-49.9, adult (Multi)  Type 2 diabetes mellitus without complication, without long-term current use of insulin  Primary osteoarthritis involving multiple joints  Fungal rash of torso               Johanne العلي, APRN-CNP          [1]    Current Outpatient Medications:   •  pregabalin (Lyrica) 100 mg capsule, 1 CAP BY MOUTH TWICE A DAY, Disp: 56 capsule, Rfl: 5  •  amLODIPine (Norvasc) 10 mg tablet, 1 TAB BY MOUTH ONCE A DAY, Disp: 28 tablet, Rfl: 10  •  cholecalciferol (Vitamin D3) 5,000 Units tablet, Take 1 tablet (5,000 Units) by mouth once daily., Disp: , Rfl:   •  docusate sodium (Colace) 100 mg capsule, Take 1 capsule (100 mg) by mouth 2 times a day., Disp: , Rfl:   •  DULoxetine (Cymbalta) 60 mg DR capsule, 1 CAP  BY MOUTH ONCE A DAY, Disp: 28 capsule, Rfl: 11  •  fluticasone (Flonase) 50 mcg/actuation nasal spray, 1 SPRAY EACH NOSTRIL ONCE A DAY, Disp: 16 g, Rfl: 11  •  lisinopril 10 mg tablet, 1 TAB BY MOUTH ONCE A DAY, Disp: 30 tablet, Rfl: 11  •  loratadine (Claritin) 10 mg tablet, 1 TAB BY MOUTH ONCE A DAY, Disp: 28 tablet, Rfl: 11  •  meloxicam (Mobic) 15 mg tablet, 1 TAB BY MOUTH EVERY MORNING, Disp: 28 tablet, Rfl: 11  •  multivit-min/iron/FA/vit K/lut (MULTIVITAMIN WOMEN 50 PLUS ORAL), Take 1 tablet by mouth once every 24 hours., Disp: , Rfl:   •  nitrofurantoin, macrocrystal-monohydrate, (Macrobid) 100 mg capsule, Take 1 capsule (100 mg) by mouth 2 times a day for 7 days., Disp: 14 capsule, Rfl: 0  •  nystatin (Mycostatin) cream, APPLY 1 APPLCIATION TOPICALLY TWICE A DAY (Patient taking differently: APPLY 1 APPLCIATION TOPICALLY TWICE A DAY,  UNDER ABDOMINAL SKIN FOLDS ), Disp: 60 g, Rfl: 10  •  semaglutide, weight loss, 2.4 mg/0.75 mL pen injector, Inject 2.4 mg under the skin 1 (one) time per week., Disp: 8 mL, Rfl: 3  •  sertraline (Zoloft) 100 mg tablet, 1 TAB BY MOUTH ONCE A DAY, Disp: 28 tablet, Rfl: 11  •  traZODone (Desyrel) 100 mg tablet, 1 TAB BY MOUTH DAILY AT BEDTIME, Disp: 28 tablet, Rfl: 11  •  triamcinolone (Kenalog) 0.1 % cream, APPLY TOPICALLY TO AFFECTED AREA 1-2 TIMES A DAY AS NEEDED, Disp: 30 g, Rfl: 10   house calls office with any acute concerns or medication needs.     Will continue with [facility] home visits. Patient has limited support, limited mobility and unable to navigate to traditional office appointments.  Follow up in 3 months and JOYCE Oleary        [1]   Current Outpatient Medications:     amLODIPine (Norvasc) 10 mg tablet, 1 TAB BY MOUTH ONCE A DAY, Disp: 28 tablet, Rfl: 10    cholecalciferol (Vitamin D3) 5,000 Units tablet, Take 1 tablet (5,000 Units) by mouth once daily., Disp: , Rfl:     docusate sodium (Colace) 100 mg capsule, Take 1 capsule (100 mg) by mouth 2 times a day., Disp: , Rfl:     DULoxetine (Cymbalta) 60 mg DR capsule, 1 CAP BY MOUTH ONCE A DAY, Disp: 28 capsule, Rfl: 11    fluticasone (Flonase) 50 mcg/actuation nasal spray, 1 SPRAY EACH NOSTRIL ONCE A DAY, Disp: 16 g, Rfl: 11    lisinopril 10 mg tablet, 1 TAB BY MOUTH ONCE A DAY, Disp: 30 tablet, Rfl: 11    loratadine (Claritin) 10 mg tablet, 1 TAB BY MOUTH ONCE A DAY, Disp: 28 tablet, Rfl: 11    meloxicam (Mobic) 15 mg tablet, 1 TAB BY MOUTH EVERY MORNING, Disp: 28 tablet, Rfl: 11    multivit-min/iron/FA/vit K/lut (MULTIVITAMIN WOMEN 50 PLUS ORAL), Take 1 tablet by mouth once every 24 hours., Disp: , Rfl:     nitrofurantoin, macrocrystal-monohydrate, (Macrobid) 100 mg capsule, Take 1 capsule (100 mg) by mouth 2 times a day for 7 days., Disp: 14 capsule, Rfl: 0    pregabalin (Lyrica) 100 mg capsule, 1 CAP BY MOUTH TWICE A DAY, Disp: 56 capsule, Rfl: 5    semaglutide, weight loss, 2.4 mg/0.75 mL pen injector, Inject 2.4 mg under the skin 1 (one) time per week., Disp: 8 mL, Rfl: 3    sertraline (Zoloft) 100 mg tablet, 1 TAB BY MOUTH ONCE A DAY, Disp: 28 tablet, Rfl: 11    traZODone (Desyrel) 100 mg tablet, 1 TAB BY MOUTH DAILY AT BEDTIME, Disp: 28 tablet, Rfl: 11    triamcinolone (Kenalog) 0.1 % cream, APPLY TOPICALLY TO AFFECTED AREA 1-2 TIMES A DAY AS NEEDED, Disp: 30 g, Rfl: 10    nystatin  (Mycostatin) cream, APPLY 1 APPLCIATION TOPICALLY TWICE A DAY (Patient taking differently: APPLY 1 APPLCIATION TOPICALLY TWICE A DAY,  UNDER ABDOMINAL SKIN FOLDS ), Disp: 60 g, Rfl: 10

## 2025-04-18 ENCOUNTER — TELEPHONE (OUTPATIENT)
Dept: PRIMARY CARE | Facility: CLINIC | Age: 70
End: 2025-04-18
Payer: MEDICARE

## 2025-05-11 DIAGNOSIS — Z79.4 TYPE 2 DIABETES MELLITUS WITH OTHER SPECIFIED COMPLICATION, WITH LONG-TERM CURRENT USE OF INSULIN: ICD-10-CM

## 2025-05-11 DIAGNOSIS — E11.69 TYPE 2 DIABETES MELLITUS WITH OTHER SPECIFIED COMPLICATION, WITH LONG-TERM CURRENT USE OF INSULIN: ICD-10-CM

## 2025-05-12 RX ORDER — SEMAGLUTIDE 2.4 MG/.75ML
INJECTION, SOLUTION SUBCUTANEOUS
Qty: 3 ML | Refills: 10 | Status: SHIPPED | OUTPATIENT
Start: 2025-05-12

## 2025-05-27 ENCOUNTER — TELEPHONE (OUTPATIENT)
Dept: PRIMARY CARE | Facility: CLINIC | Age: 70
End: 2025-05-27
Payer: MEDICARE

## 2025-05-27 DIAGNOSIS — L03.115 CELLULITIS OF RIGHT LOWER EXTREMITY: Primary | ICD-10-CM

## 2025-05-28 RX ORDER — CEPHALEXIN 500 MG/1
500 CAPSULE ORAL 3 TIMES DAILY
Qty: 30 CAPSULE | Refills: 0 | Status: SHIPPED | OUTPATIENT
Start: 2025-05-28 | End: 2025-06-07

## 2025-05-28 NOTE — TELEPHONE ENCOUNTER
"Alexsander called again stating her \"cellulitis is much worse, now the area is purple and looks like lace.  Please advise.  "
Patient called office, left message on machine that over the weekend her eczema flared up, is red, itching, hurts to touch, looks bad.  Requested ATB treatment.  Patient reports she is using MARLEEN to effected areas without relief or success.  Please Advise.  
none known

## 2025-06-07 DIAGNOSIS — B36.9 FUNGAL RASH OF TORSO: ICD-10-CM

## 2025-06-10 RX ORDER — NYSTATIN 100000 U/G
CREAM TOPICAL
Qty: 60 G | Refills: 10 | Status: SHIPPED | OUTPATIENT
Start: 2025-06-10

## 2025-06-11 ENCOUNTER — TELEPHONE (OUTPATIENT)
Dept: PRIMARY CARE | Facility: CLINIC | Age: 70
End: 2025-06-11
Payer: MEDICARE

## 2025-06-11 NOTE — TELEPHONE ENCOUNTER
Patient calls to report she had a fall on Monday 6/9/25, patient had a fall at 3:30 am tripped over her cat, fell against the wall and slid to the floor. Patient reports she calld 911 for assistance, they came, assisted her up, she walked to bathroom and back with them present, was not transported to the ER.  Patient states she continues to have discomfort on her left side sciatic and left hip.  Patient is able to walk and continues with her ADL's.  Patient reports she is taking her meds as ordered, noted she has cymbalta and Mobic ordered and states she is taking tylenol 325 mg. Three tabs po q 6 hours.     Patient further states the real reason she called was to find out more information regarding the power chair that was ordered as she's not heard from [employer].  This nurse gave patient information on when the orders were faxed back to [employer] to which patient states she will call them and inquire why she's not received her chair thus far.

## 2025-07-03 ENCOUNTER — APPOINTMENT (OUTPATIENT)
Dept: PRIMARY CARE | Facility: CLINIC | Age: 70
End: 2025-07-03
Payer: MEDICARE

## 2025-07-29 ENCOUNTER — TELEPHONE (OUTPATIENT)
Dept: PRIMARY CARE | Facility: CLINIC | Age: 70
End: 2025-07-29
Payer: MEDICARE

## 2025-07-30 ENCOUNTER — TELEPHONE (OUTPATIENT)
Dept: PRIMARY CARE | Facility: CLINIC | Age: 70
End: 2025-07-30
Payer: MEDICARE

## 2025-07-30 ENCOUNTER — OFFICE VISIT (OUTPATIENT)
Dept: PRIMARY CARE | Facility: CLINIC | Age: 70
End: 2025-07-30
Payer: MEDICARE

## 2025-07-30 VITALS
SYSTOLIC BLOOD PRESSURE: 133 MMHG | TEMPERATURE: 97 F | BODY MASS INDEX: 44.83 KG/M2 | DIASTOLIC BLOOD PRESSURE: 80 MMHG | OXYGEN SATURATION: 98 % | HEIGHT: 63 IN | WEIGHT: 253 LBS | HEART RATE: 76 BPM | RESPIRATION RATE: 16 BRPM

## 2025-07-30 DIAGNOSIS — F41.9 ANXIETY: ICD-10-CM

## 2025-07-30 DIAGNOSIS — R21 RASH: ICD-10-CM

## 2025-07-30 DIAGNOSIS — E11.9 TYPE 2 DIABETES MELLITUS WITHOUT COMPLICATION, WITHOUT LONG-TERM CURRENT USE OF INSULIN: ICD-10-CM

## 2025-07-30 DIAGNOSIS — M48.061 DEGENERATIVE LUMBAR SPINAL STENOSIS: ICD-10-CM

## 2025-07-30 DIAGNOSIS — M15.0 PRIMARY OSTEOARTHRITIS INVOLVING MULTIPLE JOINTS: ICD-10-CM

## 2025-07-30 DIAGNOSIS — F33.1 MODERATE EPISODE OF RECURRENT MAJOR DEPRESSIVE DISORDER: ICD-10-CM

## 2025-07-30 DIAGNOSIS — I10 HTN (HYPERTENSION), BENIGN: Primary | ICD-10-CM

## 2025-07-30 PROCEDURE — 1159F MED LIST DOCD IN RCRD: CPT | Performed by: NURSE PRACTITIONER

## 2025-07-30 PROCEDURE — 1160F RVW MEDS BY RX/DR IN RCRD: CPT | Performed by: NURSE PRACTITIONER

## 2025-07-30 PROCEDURE — 4010F ACE/ARB THERAPY RXD/TAKEN: CPT | Performed by: NURSE PRACTITIONER

## 2025-07-30 PROCEDURE — 3079F DIAST BP 80-89 MM HG: CPT | Performed by: NURSE PRACTITIONER

## 2025-07-30 PROCEDURE — 3075F SYST BP GE 130 - 139MM HG: CPT | Performed by: NURSE PRACTITIONER

## 2025-07-30 PROCEDURE — 99349 HOME/RES VST EST MOD MDM 40: CPT | Performed by: NURSE PRACTITIONER

## 2025-07-30 RX ORDER — METHYLPREDNISOLONE 4 MG/1
TABLET ORAL
Qty: 21 TABLET | Refills: 3 | Status: SHIPPED | OUTPATIENT
Start: 2025-07-30 | End: 2025-08-05

## 2025-07-30 NOTE — PROGRESS NOTES
"Subjective   Patient ID: Aubrie Valdivia is a 70 y.o. female who presents for No chief complaint on file..    This is a 70 year old female seen today in her private residence. She is awake and alert with appropriate awareness and is ambulatory with her walker. PMH: HTN, Depression, anxiety, DMII, BALTAZAR, Severe osteoarthritis, morbid obesity, lumbosacral radiculitis, neuropathy, Cervical radiculopathy, ovarian CA 2010.  Home bound due to severe osteoarthritis, limited support and difficult getting out of the home.     Today is a follow up for multiple chronic issues HTN, DMII, BALTAZAR, obesity recurring UTI. Today she is concerned due to UTI which is recurring and does not feel it has actually gone away.  Reports increased fatigue, with dark cloudy urine.  Reports that last antibiotic she felt that she was getting better, however now feels like it has returned and symptoms returned.  Urine dip is positive, however unable to obtain actual culture due to dynamics.  Reports that she is urinating adequate amounts and denies changes or notable strong urine.  She is hydrating, endorsing drinking \"plenty of fluids\".  States that she continues working on her weight, lifestyle changes and monitoring her caloric intake.  Started back on ozempic with good results, today's weight is 253lb.  DMII - well managed with ozempic at this time. BALTAZAR - sleeps with head elevated and in reclining position.  Continues with [facility] for PT/OT - working on electric scooter for greater independence and ability to get out of the home. Continues with chronic back pain, was getting back injections, however felt that the pain of the injection do not outweigh the results.  Reports that she has arthretic pain all throughout her body.  Discussed referral for Rheumatology, may rule out RA, reports she will consider.  Independent with all ADL's, recently had bathroom remodeled for easy access with safety measures.  Bowels reported as regular, urinating adequate " "amounts and is continent of bowel and bladder.  Orders all groceries via [employer] for home delivery.      Denies chest pain, palpitations, tachycardia, and shortness of breath, wheezing, no PND/orthopnea, or respiratory complaints for the patient. There is no fever, or chills. No nausea, vomiting, diarrhea, headaches, or vision changes or recent falls. There is no hematuria, dysuria, flank pain, or increased urgency.  Home Visit: Medically necessary due to: dementia/cognitive impairment, unsteady gait/poor balance, shortness of breath with minimal exertion, Illness or condition that results in activity limitation or restriction that impacts the ability to leave home such as: equipment and /or human assistance needed to safely leave the home, patient has limited support systems to help the patient attend office visits, the office visit would require excessive physical effort or pain for the patient.               Current Medications[1]     Review of Systems  CONSTITUTIONAL denies, fever, chills, night sweats, unexplained weight loss. CARDIOLOGY Denies, palpitations, exertional chest pain, shortness of breath. RESPIRATORY Negative for, persistent cough, hemoptysis, wheezing. GI Positive for, constipation, diarrhea. UROLOGY Denies voiding symptoms. NEUROLOGY Positive for numbness in her hands and now L foot. MUSCULOSKELETAL Positive for BLE weakness, and pain.     Objective   /80 (BP Location: Left arm, Patient Position: Sitting, BP Cuff Size: Large adult)   Pulse 76   Temp 36.1 °C (97 °F) (Temporal)   Resp 16   Ht 1.6 m (5' 3\")   Wt 115 kg (253 lb)   SpO2 98%   BMI 44.82 kg/m²     Physical Exam  Constitutional:       General: She is not in acute distress.     Appearance: Normal appearance. She is obese.   HENT:      Head: Normocephalic and atraumatic.      Right Ear: External ear normal.      Left Ear: External ear normal.      Nose: Nose normal.      Mouth/Throat:      Mouth: Mucous membranes are moist. "      Pharynx: Oropharynx is clear.   Eyes:      Extraocular Movements: Extraocular movements intact.      Pupils: Pupils are equal, round, and reactive to light.   Cardiovascular:      Rate and Rhythm: Normal rate and regular rhythm.      Pulses: Normal pulses.      Heart sounds: Normal heart sounds. No murmur heard.  Pulmonary:      Effort: Pulmonary effort is normal. No respiratory distress.      Breath sounds: Normal breath sounds. No wheezing.   Abdominal:      General: Bowel sounds are normal. There is no distension.      Palpations: Abdomen is soft.      Tenderness: There is no abdominal tenderness.   Musculoskeletal:      Cervical back: Normal range of motion and neck supple. No tenderness.      Right lower leg: Edema present.      Left lower leg: Edema present.   Skin:     General: Skin is warm and dry.      Capillary Refill: Capillary refill takes 2 to 3 seconds.      Findings: No bruising.      Comments: Fungal rash bilateral lower breast   Neurological:      Mental Status: She is alert and oriented to person, place, and time.      Motor: Weakness present.      Gait: Gait abnormal.   Psychiatric:         Mood and Affect: Mood normal.        Assessment/Plan   Diagnoses and all orders for this visit:  HTN (hypertension), benign  Type 2 diabetes mellitus without complication, without long-term current use of insulin  Primary osteoarthritis involving multiple joints  Anxiety  Moderate episode of recurrent major depressive disorder  Degenerative lumbar spinal stenosis  Rash  -     methylPREDNISolone (Medrol Dospak) 4 mg tablets; Take as directed on package.    More than 50% of time spent face-to-face discussion, a total of 60 minutes with this patient on counseling, coordination of care, and review of medical records and diagnostics. Advised pt to contact the house calls office with any acute concerns or medication needs.                Johanne العلي, APRN-ELVA          [1]    Current Outpatient Medications:    •  amLODIPine (Norvasc) 10 mg tablet, 1 TAB BY MOUTH ONCE A DAY, Disp: 28 tablet, Rfl: 10  •  cholecalciferol (Vitamin D3) 5,000 Units tablet, Take 1 tablet (5,000 Units) by mouth once daily., Disp: , Rfl:   •  docusate sodium (Colace) 100 mg capsule, Take 1 capsule (100 mg) by mouth 2 times a day., Disp: , Rfl:   •  DULoxetine (Cymbalta) 60 mg DR capsule, 1 CAP BY MOUTH ONCE A DAY, Disp: 28 capsule, Rfl: 11  •  fluticasone (Flonase) 50 mcg/actuation nasal spray, 1 SPRAY EACH NOSTRIL ONCE A DAY, Disp: 16 g, Rfl: 11  •  lisinopril 10 mg tablet, 1 TAB BY MOUTH ONCE A DAY, Disp: 30 tablet, Rfl: 11  •  loratadine (Claritin) 10 mg tablet, 1 TAB BY MOUTH ONCE A DAY, Disp: 28 tablet, Rfl: 11  •  meloxicam (Mobic) 15 mg tablet, 1 TAB BY MOUTH EVERY MORNING, Disp: 28 tablet, Rfl: 11  •  multivit-min/iron/FA/vit K/lut (MULTIVITAMIN WOMEN 50 PLUS ORAL), Take 1 tablet by mouth once every 24 hours., Disp: , Rfl:   •  nystatin (Mycostatin) cream, APPLY 1 APPLCIATION TOPICALLY TWICE A DAY, Disp: 60 g, Rfl: 10  •  pregabalin (Lyrica) 100 mg capsule, 1 CAP BY MOUTH TWICE A DAY, Disp: 56 capsule, Rfl: 5  •  sertraline (Zoloft) 100 mg tablet, 1 TAB BY MOUTH ONCE A DAY, Disp: 28 tablet, Rfl: 11  •  traZODone (Desyrel) 100 mg tablet, 1 TAB BY MOUTH DAILY AT BEDTIME, Disp: 28 tablet, Rfl: 11  •  triamcinolone (Kenalog) 0.1 % cream, APPLY TOPICALLY TO AFFECTED AREA 1-2 TIMES A DAY AS NEEDED, Disp: 30 g, Rfl: 10  •  Wegovy 2.4 mg/0.75 mL pen injector, 0.75ML (2.4MG) SUBQ WEEKLY, Disp: 3 mL, Rfl: 10

## 2025-08-08 ENCOUNTER — TELEPHONE (OUTPATIENT)
Dept: PRIMARY CARE | Facility: CLINIC | Age: 70
End: 2025-08-08
Payer: MEDICARE

## 2025-08-08 NOTE — TELEPHONE ENCOUNTER
Pt states she took last pill today that you prescribed and she has two new spots on the back of her leg that are itchy.

## 2025-08-12 ENCOUNTER — TELEPHONE (OUTPATIENT)
Dept: PRIMARY CARE | Facility: CLINIC | Age: 70
End: 2025-08-12
Payer: MEDICARE

## 2025-08-13 ENCOUNTER — TELEPHONE (OUTPATIENT)
Dept: PRIMARY CARE | Facility: CLINIC | Age: 70
End: 2025-08-13
Payer: MEDICARE

## 2025-09-04 DIAGNOSIS — I10 HTN (HYPERTENSION), BENIGN: ICD-10-CM

## 2025-09-05 RX ORDER — LISINOPRIL 10 MG/1
10 TABLET ORAL DAILY
Qty: 28 TABLET | Refills: 10 | Status: SHIPPED | OUTPATIENT
Start: 2025-09-05